# Patient Record
Sex: FEMALE | Race: WHITE | Employment: FULL TIME | URBAN - METROPOLITAN AREA
[De-identification: names, ages, dates, MRNs, and addresses within clinical notes are randomized per-mention and may not be internally consistent; named-entity substitution may affect disease eponyms.]

---

## 2017-03-07 ENCOUNTER — LAB ENCOUNTER (OUTPATIENT)
Dept: LAB | Facility: HOSPITAL | Age: 52
End: 2017-03-07
Attending: INTERNAL MEDICINE
Payer: COMMERCIAL

## 2017-03-07 DIAGNOSIS — E11.9 DIABETES MELLITUS (HCC): Primary | ICD-10-CM

## 2017-03-07 LAB
EST. AVERAGE GLUCOSE BLD GHB EST-MCNC: 148 MG/DL (ref 68–126)
HBA1C MFR BLD HPLC: 6.8 % (ref ?–5.7)

## 2017-03-07 PROCEDURE — 36415 COLL VENOUS BLD VENIPUNCTURE: CPT

## 2017-03-07 PROCEDURE — 83036 HEMOGLOBIN GLYCOSYLATED A1C: CPT

## 2017-06-16 ENCOUNTER — HOSPITAL ENCOUNTER (OUTPATIENT)
Dept: CT IMAGING | Facility: HOSPITAL | Age: 52
Discharge: HOME | End: 2017-06-16
Attending: INTERNAL MEDICINE

## 2017-06-16 DIAGNOSIS — Z13.9 ENCOUNTER FOR SCREENING: ICD-10-CM

## 2018-02-12 ENCOUNTER — LAB ENCOUNTER (OUTPATIENT)
Dept: LAB | Age: 53
End: 2018-02-12
Attending: INTERNAL MEDICINE
Payer: COMMERCIAL

## 2018-02-12 DIAGNOSIS — Z00.00 ROUTINE GENERAL MEDICAL EXAMINATION AT A HEALTH CARE FACILITY: Primary | ICD-10-CM

## 2018-02-12 LAB
25-HYDROXYVITAMIN D (TOTAL): 28.9 NG/ML (ref 30–100)
ALBUMIN SERPL-MCNC: 4.3 G/DL (ref 3.5–4.8)
ALP LIVER SERPL-CCNC: 61 U/L (ref 41–108)
ALT SERPL-CCNC: 53 U/L (ref 14–54)
AST SERPL-CCNC: 51 U/L (ref 15–41)
BASOPHILS # BLD AUTO: 0.07 X10(3) UL (ref 0–0.1)
BASOPHILS NFR BLD AUTO: 0.8 %
BILIRUB SERPL-MCNC: 0.5 MG/DL (ref 0.1–2)
BUN BLD-MCNC: 16 MG/DL (ref 8–20)
CALCIUM BLD-MCNC: 9.9 MG/DL (ref 8.3–10.3)
CHLORIDE: 104 MMOL/L (ref 101–111)
CHOLEST SMN-MCNC: 102 MG/DL (ref ?–200)
CO2: 26 MMOL/L (ref 22–32)
CREAT BLD-MCNC: 0.73 MG/DL (ref 0.55–1.02)
CREAT UR-SCNC: 55 MG/DL
EOSINOPHIL # BLD AUTO: 0.41 X10(3) UL (ref 0–0.3)
EOSINOPHIL NFR BLD AUTO: 4.6 %
ERYTHROCYTE [DISTWIDTH] IN BLOOD BY AUTOMATED COUNT: 12.5 % (ref 11.5–16)
EST. AVERAGE GLUCOSE BLD GHB EST-MCNC: 226 MG/DL (ref 68–126)
GLUCOSE BLD-MCNC: 202 MG/DL (ref 70–99)
HBA1C MFR BLD HPLC: 9.5 % (ref ?–5.7)
HCT VFR BLD AUTO: 45.4 % (ref 34–50)
HDLC SERPL-MCNC: 31 MG/DL (ref 45–?)
HDLC SERPL: 3.29 {RATIO} (ref ?–4.44)
HGB BLD-MCNC: 14.6 G/DL (ref 12–16)
IMMATURE GRANULOCYTE COUNT: 0.02 X10(3) UL (ref 0–1)
IMMATURE GRANULOCYTE RATIO %: 0.2 %
LDLC SERPL CALC-MCNC: 46 MG/DL (ref ?–130)
LYMPHOCYTES # BLD AUTO: 3.51 X10(3) UL (ref 0.9–4)
LYMPHOCYTES NFR BLD AUTO: 39.5 %
M PROTEIN MFR SERPL ELPH: 8.1 G/DL (ref 6.1–8.3)
MCH RBC QN AUTO: 29.1 PG (ref 27–33.2)
MCHC RBC AUTO-ENTMCNC: 32.2 G/DL (ref 31–37)
MCV RBC AUTO: 90.4 FL (ref 81–100)
MICROALBUMIN UR-MCNC: 0.63 MG/DL
MICROALBUMIN/CREAT 24H UR-RTO: 11.5 UG/MG (ref ?–30)
MONOCYTES # BLD AUTO: 0.67 X10(3) UL (ref 0.1–1)
MONOCYTES NFR BLD AUTO: 7.5 %
NEUTROPHIL ABS PRELIM: 4.21 X10 (3) UL (ref 1.3–6.7)
NEUTROPHILS # BLD AUTO: 4.21 X10(3) UL (ref 1.3–6.7)
NEUTROPHILS NFR BLD AUTO: 47.4 %
NONHDLC SERPL-MCNC: 71 MG/DL (ref ?–130)
PLATELET # BLD AUTO: 279 10(3)UL (ref 150–450)
POTASSIUM SERPL-SCNC: 4.8 MMOL/L (ref 3.6–5.1)
RBC # BLD AUTO: 5.02 X10(6)UL (ref 3.8–5.1)
RED CELL DISTRIBUTION WIDTH-SD: 41.1 FL (ref 35.1–46.3)
SODIUM SERPL-SCNC: 138 MMOL/L (ref 136–144)
TRIGL SERPL-MCNC: 123 MG/DL (ref ?–150)
TSI SER-ACNC: 0.69 MIU/ML (ref 0.35–5.5)
VLDLC SERPL CALC-MCNC: 25 MG/DL (ref 5–40)
WBC # BLD AUTO: 8.9 X10(3) UL (ref 4–13)

## 2018-02-12 PROCEDURE — 82306 VITAMIN D 25 HYDROXY: CPT

## 2018-02-12 PROCEDURE — 82570 ASSAY OF URINE CREATININE: CPT

## 2018-02-12 PROCEDURE — 84443 ASSAY THYROID STIM HORMONE: CPT

## 2018-02-12 PROCEDURE — 80053 COMPREHEN METABOLIC PANEL: CPT

## 2018-02-12 PROCEDURE — 82043 UR ALBUMIN QUANTITATIVE: CPT

## 2018-02-12 PROCEDURE — 80061 LIPID PANEL: CPT

## 2018-02-12 PROCEDURE — 36415 COLL VENOUS BLD VENIPUNCTURE: CPT

## 2018-02-12 PROCEDURE — 83036 HEMOGLOBIN GLYCOSYLATED A1C: CPT

## 2018-02-12 PROCEDURE — 85025 COMPLETE CBC W/AUTO DIFF WBC: CPT

## 2018-07-30 ENCOUNTER — LAB ENCOUNTER (OUTPATIENT)
Dept: LAB | Facility: HOSPITAL | Age: 53
End: 2018-07-30
Attending: INTERNAL MEDICINE
Payer: COMMERCIAL

## 2018-07-30 DIAGNOSIS — E11.9 DM2 (DIABETES MELLITUS, TYPE 2) (HCC): ICD-10-CM

## 2018-07-30 DIAGNOSIS — Z00.00 ROUTINE GENERAL MEDICAL EXAMINATION AT A HEALTH CARE FACILITY: Primary | ICD-10-CM

## 2018-07-30 LAB
ALBUMIN SERPL-MCNC: 3.7 G/DL (ref 3.5–4.8)
ALBUMIN/GLOB SERPL: 1 {RATIO} (ref 1–2)
ALP LIVER SERPL-CCNC: 78 U/L (ref 41–108)
ALT SERPL-CCNC: 37 U/L (ref 14–54)
ANION GAP SERPL CALC-SCNC: 7 MMOL/L (ref 0–18)
AST SERPL-CCNC: 27 U/L (ref 15–41)
BASOPHILS # BLD AUTO: 0.07 X10(3) UL (ref 0–0.1)
BASOPHILS NFR BLD AUTO: 0.8 %
BILIRUB SERPL-MCNC: 0.4 MG/DL (ref 0.1–2)
BUN BLD-MCNC: 17 MG/DL (ref 8–20)
BUN/CREAT SERPL: 28.3 (ref 10–20)
CALCIUM BLD-MCNC: 9.2 MG/DL (ref 8.3–10.3)
CHLORIDE SERPL-SCNC: 107 MMOL/L (ref 101–111)
CHOLEST SMN-MCNC: 111 MG/DL (ref ?–200)
CO2 SERPL-SCNC: 29 MMOL/L (ref 22–32)
CREAT BLD-MCNC: 0.6 MG/DL (ref 0.55–1.02)
CREAT UR-SCNC: 90.4 MG/DL
EOSINOPHIL # BLD AUTO: 0.47 X10(3) UL (ref 0–0.3)
EOSINOPHIL NFR BLD AUTO: 5.2 %
ERYTHROCYTE [DISTWIDTH] IN BLOOD BY AUTOMATED COUNT: 13.1 % (ref 11.5–16)
EST. AVERAGE GLUCOSE BLD GHB EST-MCNC: 154 MG/DL (ref 68–126)
GLOBULIN PLAS-MCNC: 3.8 G/DL (ref 2.5–3.7)
GLUCOSE BLD-MCNC: 158 MG/DL (ref 70–99)
HBA1C MFR BLD HPLC: 7 % (ref ?–5.7)
HCT VFR BLD AUTO: 43.8 % (ref 34–50)
HDLC SERPL-MCNC: 35 MG/DL (ref 40–59)
HGB BLD-MCNC: 14 G/DL (ref 12–16)
IMMATURE GRANULOCYTE COUNT: 0.05 X10(3) UL (ref 0–1)
IMMATURE GRANULOCYTE RATIO %: 0.6 %
LDLC SERPL CALC-MCNC: 45 MG/DL (ref ?–100)
LYMPHOCYTES # BLD AUTO: 2.92 X10(3) UL (ref 0.9–4)
LYMPHOCYTES NFR BLD AUTO: 32.4 %
M PROTEIN MFR SERPL ELPH: 7.5 G/DL (ref 6.1–8.3)
MCH RBC QN AUTO: 28.3 PG (ref 27–33.2)
MCHC RBC AUTO-ENTMCNC: 32 G/DL (ref 31–37)
MCV RBC AUTO: 88.7 FL (ref 81–100)
MICROALBUMIN UR-MCNC: 0.84 MG/DL
MICROALBUMIN/CREAT 24H UR-RTO: 9.3 UG/MG (ref ?–30)
MONOCYTES # BLD AUTO: 0.78 X10(3) UL (ref 0.1–1)
MONOCYTES NFR BLD AUTO: 8.7 %
NEUTROPHIL ABS PRELIM: 4.71 X10 (3) UL (ref 1.3–6.7)
NEUTROPHILS # BLD AUTO: 4.71 X10(3) UL (ref 1.3–6.7)
NEUTROPHILS NFR BLD AUTO: 52.3 %
NONHDLC SERPL-MCNC: 76 MG/DL (ref ?–130)
OSMOLALITY SERPL CALC.SUM OF ELEC: 301 MOSM/KG (ref 275–295)
PLATELET # BLD AUTO: 250 10(3)UL (ref 150–450)
POTASSIUM SERPL-SCNC: 4.7 MMOL/L (ref 3.6–5.1)
RBC # BLD AUTO: 4.94 X10(6)UL (ref 3.8–5.1)
RED CELL DISTRIBUTION WIDTH-SD: 42 FL (ref 35.1–46.3)
SODIUM SERPL-SCNC: 143 MMOL/L (ref 136–144)
T4 FREE SERPL-MCNC: 0.8 NG/DL (ref 0.9–1.8)
TRIGL SERPL-MCNC: 157 MG/DL (ref 30–149)
TSI SER-ACNC: 0.34 MIU/ML (ref 0.35–5.5)
VIT D+METAB SERPL-MCNC: 25.8 NG/ML (ref 30–100)
VLDLC SERPL CALC-MCNC: 31 MG/DL (ref 0–30)
WBC # BLD AUTO: 9 X10(3) UL (ref 4–13)

## 2018-07-30 PROCEDURE — 82306 VITAMIN D 25 HYDROXY: CPT

## 2018-07-30 PROCEDURE — 80061 LIPID PANEL: CPT

## 2018-07-30 PROCEDURE — 84439 ASSAY OF FREE THYROXINE: CPT

## 2018-07-30 PROCEDURE — 82043 UR ALBUMIN QUANTITATIVE: CPT

## 2018-07-30 PROCEDURE — 82570 ASSAY OF URINE CREATININE: CPT

## 2018-07-30 PROCEDURE — 80053 COMPREHEN METABOLIC PANEL: CPT

## 2018-07-30 PROCEDURE — 85025 COMPLETE CBC W/AUTO DIFF WBC: CPT

## 2018-07-30 PROCEDURE — 36415 COLL VENOUS BLD VENIPUNCTURE: CPT

## 2018-07-30 PROCEDURE — 84443 ASSAY THYROID STIM HORMONE: CPT

## 2018-07-30 PROCEDURE — 83036 HEMOGLOBIN GLYCOSYLATED A1C: CPT

## 2018-09-10 ENCOUNTER — HOSPITAL ENCOUNTER (OUTPATIENT)
Dept: MAMMOGRAPHY | Facility: HOSPITAL | Age: 53
Discharge: HOME | End: 2018-09-10
Attending: INTERNAL MEDICINE
Payer: COMMERCIAL

## 2018-09-10 DIAGNOSIS — Z12.31 SCREENING MAMMOGRAM, ENCOUNTER FOR: ICD-10-CM

## 2018-09-10 PROCEDURE — 77067 SCR MAMMO BI INCL CAD: CPT | Performed by: INTERNAL MEDICINE

## 2018-09-10 PROCEDURE — 77063 BREAST TOMOSYNTHESIS BI: CPT | Performed by: INTERNAL MEDICINE

## 2018-11-09 ENCOUNTER — HOSPITAL ENCOUNTER (OUTPATIENT)
Dept: CV DIAGNOSTICS | Facility: HOSPITAL | Age: 53
Discharge: HOME | End: 2018-11-09
Attending: INTERNAL MEDICINE
Payer: COMMERCIAL

## 2018-11-09 ENCOUNTER — APPOINTMENT (OUTPATIENT)
Dept: LAB | Facility: HOSPITAL | Age: 53
End: 2018-11-09
Attending: INTERNAL MEDICINE
Payer: COMMERCIAL

## 2018-11-09 DIAGNOSIS — E11.9 DM (DIABETES MELLITUS) (HCC): ICD-10-CM

## 2018-11-09 DIAGNOSIS — R93.1 ABNORMAL CT SCAN OF HEART: ICD-10-CM

## 2018-11-09 PROCEDURE — 93017 CV STRESS TEST TRACING ONLY: CPT | Performed by: INTERNAL MEDICINE

## 2018-11-09 PROCEDURE — 93018 CV STRESS TEST I&R ONLY: CPT | Performed by: INTERNAL MEDICINE

## 2018-11-14 ENCOUNTER — OFFICE VISIT (OUTPATIENT)
Dept: SLEEP CENTER | Facility: HOSPITAL | Age: 53
End: 2018-11-14
Attending: INTERNAL MEDICINE
Payer: COMMERCIAL

## 2018-11-14 DIAGNOSIS — R06.81 APNEA: ICD-10-CM

## 2018-11-14 DIAGNOSIS — G47.419 NARCOLEPSY: ICD-10-CM

## 2018-11-14 PROCEDURE — 95810 POLYSOM 6/> YRS 4/> PARAM: CPT

## 2018-11-21 ENCOUNTER — TELEPHONE (OUTPATIENT)
Dept: ENDOSCOPY | Facility: HOSPITAL | Age: 53
End: 2018-11-21

## 2018-11-21 NOTE — TELEPHONE ENCOUNTER
Returned pt's call   Spoke with patient regarding results of DX sleep study   AHI 8  Supine AHI 16 non-supine AHI 7 Sao2 Bry 85%   Patient stated understanding of results   Patient study is consistent with TAMEKA  Patient recommended to clinical correlation

## 2018-11-21 NOTE — PROCEDURES
[address]       Accredited by the [facility] ([facility])    PATIENT'S NAME:        Ofelia   ATTENDING PHYSICIAN:   Yessenia Melton M.D. REFERRING PHYSICIAN:   Yessenia Melton M.D.   PATIENT onset latency 243 minutes, wake after sleep onset 78 minutes, for a sleep efficiency of 83%. Sleep stage breakdown as follows:  Stage 1, 16%; stage 2, 70%; stage 3, 8%; stage REM 6%.      RESPIRATORY MEASURES:  The patient had a total of 52 obstructive, ap me with any additional questions.     Dictated By Bailee Cohen M.D.  d: 11/20/2018 17:24:32  t: 11/20/2018 20:23:48  Job [id number]  XD/    cc: Brandon Gonzalez M.D.

## 2019-05-02 ENCOUNTER — LAB ENCOUNTER (OUTPATIENT)
Dept: LAB | Facility: HOSPITAL | Age: 54
End: 2019-05-02
Attending: INTERNAL MEDICINE
Payer: COMMERCIAL

## 2019-05-02 DIAGNOSIS — E11.9 DIABETES MELLITUS WITHOUT COMPLICATION (HCC): ICD-10-CM

## 2019-05-02 DIAGNOSIS — Z00.00 ROUTINE GENERAL MEDICAL EXAMINATION AT A HEALTH CARE FACILITY: ICD-10-CM

## 2019-05-02 PROCEDURE — 84443 ASSAY THYROID STIM HORMONE: CPT

## 2019-05-02 PROCEDURE — 83036 HEMOGLOBIN GLYCOSYLATED A1C: CPT

## 2019-05-02 PROCEDURE — 82570 ASSAY OF URINE CREATININE: CPT

## 2019-05-02 PROCEDURE — 80061 LIPID PANEL: CPT

## 2019-05-02 PROCEDURE — 85025 COMPLETE CBC W/AUTO DIFF WBC: CPT

## 2019-05-02 PROCEDURE — 82043 UR ALBUMIN QUANTITATIVE: CPT

## 2019-05-02 PROCEDURE — 36415 COLL VENOUS BLD VENIPUNCTURE: CPT

## 2019-05-02 PROCEDURE — 82306 VITAMIN D 25 HYDROXY: CPT

## 2019-05-02 PROCEDURE — 80053 COMPREHEN METABOLIC PANEL: CPT

## 2019-09-23 RX ORDER — IBUPROFEN 800 MG/1
800 TABLET ORAL 2 TIMES DAILY
Status: ON HOLD | COMMUNITY
End: 2019-10-21

## 2019-09-23 RX ORDER — ACETAMINOPHEN 500 MG
1000 TABLET ORAL DAILY
Status: ON HOLD | COMMUNITY
End: 2019-10-21

## 2019-10-02 ENCOUNTER — APPOINTMENT (OUTPATIENT)
Dept: LAB | Facility: HOSPITAL | Age: 54
End: 2019-10-02
Attending: ORTHOPAEDIC SURGERY
Payer: COMMERCIAL

## 2019-10-02 DIAGNOSIS — M16.12 PRIMARY OSTEOARTHRITIS OF LEFT HIP: ICD-10-CM

## 2019-10-02 PROCEDURE — 80053 COMPREHEN METABOLIC PANEL: CPT

## 2019-10-02 PROCEDURE — 87081 CULTURE SCREEN ONLY: CPT

## 2019-10-02 PROCEDURE — 86900 BLOOD TYPING SEROLOGIC ABO: CPT

## 2019-10-02 PROCEDURE — 36415 COLL VENOUS BLD VENIPUNCTURE: CPT

## 2019-10-02 PROCEDURE — 93010 ELECTROCARDIOGRAM REPORT: CPT | Performed by: INTERNAL MEDICINE

## 2019-10-02 PROCEDURE — 86850 RBC ANTIBODY SCREEN: CPT

## 2019-10-02 PROCEDURE — 93005 ELECTROCARDIOGRAM TRACING: CPT

## 2019-10-02 PROCEDURE — 85610 PROTHROMBIN TIME: CPT

## 2019-10-02 PROCEDURE — 85025 COMPLETE CBC W/AUTO DIFF WBC: CPT

## 2019-10-02 PROCEDURE — 86901 BLOOD TYPING SEROLOGIC RH(D): CPT

## 2019-10-02 PROCEDURE — 85730 THROMBOPLASTIN TIME PARTIAL: CPT

## 2019-10-07 ENCOUNTER — LAB ENCOUNTER (OUTPATIENT)
Dept: LAB | Facility: HOSPITAL | Age: 54
End: 2019-10-07
Attending: INTERNAL MEDICINE
Payer: COMMERCIAL

## 2019-10-07 ENCOUNTER — HOSPITAL ENCOUNTER (OUTPATIENT)
Dept: PHYSICAL THERAPY | Facility: HOSPITAL | Age: 54
Discharge: HOME | End: 2019-10-07
Attending: ORTHOPAEDIC SURGERY
Payer: COMMERCIAL

## 2019-10-07 DIAGNOSIS — Z00.00 ENCOUNTER FOR BLOOD TEST FOR ROUTINE GENERAL PHYSICAL EXAMINATION: ICD-10-CM

## 2019-10-07 DIAGNOSIS — R69 DIAGNOSIS UNKNOWN: Primary | ICD-10-CM

## 2019-10-07 DIAGNOSIS — M16.12 PRIMARY OSTEOARTHRITIS OF LEFT HIP: ICD-10-CM

## 2019-10-07 PROCEDURE — 83036 HEMOGLOBIN GLYCOSYLATED A1C: CPT

## 2019-10-07 PROCEDURE — 82607 VITAMIN B-12: CPT

## 2019-10-07 PROCEDURE — 36415 COLL VENOUS BLD VENIPUNCTURE: CPT

## 2019-10-21 ENCOUNTER — ANESTHESIA EVENT (OUTPATIENT)
Dept: SURGERY | Facility: HOSPITAL | Age: 54
DRG: 470 | End: 2019-10-21
Payer: COMMERCIAL

## 2019-10-21 ENCOUNTER — APPOINTMENT (OUTPATIENT)
Dept: GENERAL RADIOLOGY | Facility: HOSPITAL | Age: 54
DRG: 470 | End: 2019-10-21
Attending: ORTHOPAEDIC SURGERY
Payer: COMMERCIAL

## 2019-10-21 ENCOUNTER — HOSPITAL ENCOUNTER (INPATIENT)
Facility: HOSPITAL | Age: 54
LOS: 2 days | Discharge: HOME HEALTH | DRG: 470 | End: 2019-10-23
Attending: ORTHOPAEDIC SURGERY | Admitting: ORTHOPAEDIC SURGERY
Payer: COMMERCIAL

## 2019-10-21 ENCOUNTER — ANESTHESIA (OUTPATIENT)
Dept: SURGERY | Facility: HOSPITAL | Age: 54
DRG: 470 | End: 2019-10-21
Payer: COMMERCIAL

## 2019-10-21 DIAGNOSIS — M16.12 PRIMARY OSTEOARTHRITIS OF LEFT HIP: Primary | ICD-10-CM

## 2019-10-21 PROBLEM — E66.9 OBESITY: Status: ACTIVE | Noted: 2019-10-21

## 2019-10-21 PROCEDURE — 82962 GLUCOSE BLOOD TEST: CPT

## 2019-10-21 PROCEDURE — 88304 TISSUE EXAM BY PATHOLOGIST: CPT | Performed by: ORTHOPAEDIC SURGERY

## 2019-10-21 PROCEDURE — 76000 FLUOROSCOPY <1 HR PHYS/QHP: CPT | Performed by: ORTHOPAEDIC SURGERY

## 2019-10-21 PROCEDURE — 76942 ECHO GUIDE FOR BIOPSY: CPT | Performed by: ORTHOPAEDIC SURGERY

## 2019-10-21 PROCEDURE — 3E0T3BZ INTRODUCTION OF ANESTHETIC AGENT INTO PERIPHERAL NERVES AND PLEXI, PERCUTANEOUS APPROACH: ICD-10-PCS | Performed by: ANESTHESIOLOGY

## 2019-10-21 PROCEDURE — 88311 DECALCIFY TISSUE: CPT | Performed by: ORTHOPAEDIC SURGERY

## 2019-10-21 PROCEDURE — 0SRB0JZ REPLACEMENT OF LEFT HIP JOINT WITH SYNTHETIC SUBSTITUTE, OPEN APPROACH: ICD-10-PCS | Performed by: ORTHOPAEDIC SURGERY

## 2019-10-21 PROCEDURE — 97161 PT EVAL LOW COMPLEX 20 MIN: CPT

## 2019-10-21 DEVICE — PINNACLE POROCOAT ACETABULAR SHELL SECTOR II 52MM OD
Type: IMPLANTABLE DEVICE | Site: HIP | Status: FUNCTIONAL
Brand: PINNACLE POROCOAT

## 2019-10-21 DEVICE — PINNACLE HIP SOLUTIONS ALTRX POLYETHYLENE ACETABULAR LINER NEUTRAL 32MM ID 52MM OD
Type: IMPLANTABLE DEVICE | Site: HIP | Status: FUNCTIONAL
Brand: PINNACLE ALTRX

## 2019-10-21 DEVICE — CORAIL HIP SYSTEM CEMENTLESS FEMORAL STEM 12/14 AMT 125 DEGREES KLA SIZE 13 HA COATED HIGH OFFSET COLLAR
Type: IMPLANTABLE DEVICE | Site: HIP | Status: FUNCTIONAL
Brand: CORAIL

## 2019-10-21 DEVICE — BIOLOX DELTA CERAMIC FEMORAL HEAD 32MM DIA +1 12/14 TAPER
Type: IMPLANTABLE DEVICE | Site: HIP | Status: FUNCTIONAL
Brand: BIOLOX DELTA

## 2019-10-21 RX ORDER — METFORMIN HYDROCHLORIDE 750 MG/1
1500 TABLET, EXTENDED RELEASE ORAL NIGHTLY
COMMUNITY

## 2019-10-21 RX ORDER — ZOLPIDEM TARTRATE 10 MG/1
10 TABLET ORAL NIGHTLY PRN
Status: DISCONTINUED | OUTPATIENT
Start: 2019-10-21 | End: 2019-10-21

## 2019-10-21 RX ORDER — DOCUSATE SODIUM 100 MG/1
100 CAPSULE, LIQUID FILLED ORAL 2 TIMES DAILY
Qty: 60 CAPSULE | Refills: 0 | Status: SHIPPED | OUTPATIENT
Start: 2019-10-21 | End: 2020-02-20

## 2019-10-21 RX ORDER — BISACODYL 10 MG
10 SUPPOSITORY, RECTAL RECTAL
Status: DISCONTINUED | OUTPATIENT
Start: 2019-10-21 | End: 2019-10-23

## 2019-10-21 RX ORDER — SODIUM CHLORIDE 9 MG/ML
INJECTION, SOLUTION INTRAVENOUS CONTINUOUS
Status: DISCONTINUED | OUTPATIENT
Start: 2019-10-21 | End: 2019-10-23

## 2019-10-21 RX ORDER — SENNOSIDES 8.6 MG
17.2 TABLET ORAL NIGHTLY
Status: DISCONTINUED | OUTPATIENT
Start: 2019-10-21 | End: 2019-10-23

## 2019-10-21 RX ORDER — OXYCODONE HYDROCHLORIDE 10 MG/1
10 TABLET ORAL EVERY 4 HOURS PRN
Status: DISCONTINUED | OUTPATIENT
Start: 2019-10-21 | End: 2019-10-22

## 2019-10-21 RX ORDER — PIOGLITAZONEHYDROCHLORIDE 15 MG/1
15 TABLET ORAL DAILY
Status: DISCONTINUED | OUTPATIENT
Start: 2019-10-21 | End: 2019-10-23

## 2019-10-21 RX ORDER — ALPRAZOLAM 1 MG/1
1 TABLET ORAL NIGHTLY PRN
Status: DISCONTINUED | OUTPATIENT
Start: 2019-10-21 | End: 2019-10-21

## 2019-10-21 RX ORDER — SODIUM PHOSPHATE, DIBASIC AND SODIUM PHOSPHATE, MONOBASIC 7; 19 G/133ML; G/133ML
1 ENEMA RECTAL ONCE AS NEEDED
Status: DISCONTINUED | OUTPATIENT
Start: 2019-10-21 | End: 2019-10-23

## 2019-10-21 RX ORDER — ACETAMINOPHEN 325 MG/1
650 TABLET ORAL 4 TIMES DAILY
Status: DISCONTINUED | OUTPATIENT
Start: 2019-10-21 | End: 2019-10-22

## 2019-10-21 RX ORDER — ESCITALOPRAM OXALATE 20 MG/1
20 TABLET ORAL NIGHTLY
Status: DISCONTINUED | OUTPATIENT
Start: 2019-10-21 | End: 2019-10-23

## 2019-10-21 RX ORDER — ONDANSETRON 2 MG/ML
4 INJECTION INTRAMUSCULAR; INTRAVENOUS AS NEEDED
Status: DISCONTINUED | OUTPATIENT
Start: 2019-10-21 | End: 2019-10-21 | Stop reason: HOSPADM

## 2019-10-21 RX ORDER — DIPHENHYDRAMINE HCL 25 MG
25 CAPSULE ORAL EVERY 4 HOURS PRN
Status: DISCONTINUED | OUTPATIENT
Start: 2019-10-21 | End: 2019-10-23

## 2019-10-21 RX ORDER — DULAGLUTIDE 1.5 MG/.5ML
0.5 INJECTION, SOLUTION SUBCUTANEOUS WEEKLY
Refills: 5 | COMMUNITY
Start: 2019-06-04 | End: 2021-07-20

## 2019-10-21 RX ORDER — HYDROMORPHONE HYDROCHLORIDE 1 MG/ML
0.2 INJECTION, SOLUTION INTRAMUSCULAR; INTRAVENOUS; SUBCUTANEOUS EVERY 2 HOUR PRN
Status: ACTIVE | OUTPATIENT
Start: 2019-10-21 | End: 2019-10-23

## 2019-10-21 RX ORDER — MIDAZOLAM HYDROCHLORIDE 1 MG/ML
1 INJECTION INTRAMUSCULAR; INTRAVENOUS EVERY 5 MIN PRN
Status: DISCONTINUED | OUTPATIENT
Start: 2019-10-21 | End: 2019-10-21 | Stop reason: HOSPADM

## 2019-10-21 RX ORDER — OXYCODONE HYDROCHLORIDE 5 MG/1
5 TABLET ORAL EVERY 4 HOURS PRN
Status: DISCONTINUED | OUTPATIENT
Start: 2019-10-21 | End: 2019-10-22

## 2019-10-21 RX ORDER — METOCLOPRAMIDE HYDROCHLORIDE 5 MG/ML
10 INJECTION INTRAMUSCULAR; INTRAVENOUS EVERY 6 HOURS PRN
Status: ACTIVE | OUTPATIENT
Start: 2019-10-21 | End: 2019-10-23

## 2019-10-21 RX ORDER — OXYCODONE HYDROCHLORIDE 15 MG/1
15 TABLET ORAL EVERY 4 HOURS PRN
Status: DISCONTINUED | OUTPATIENT
Start: 2019-10-21 | End: 2019-10-22

## 2019-10-21 RX ORDER — ONDANSETRON 2 MG/ML
4 INJECTION INTRAMUSCULAR; INTRAVENOUS EVERY 4 HOURS PRN
Status: DISCONTINUED | OUTPATIENT
Start: 2019-10-21 | End: 2019-10-23

## 2019-10-21 RX ORDER — LAMOTRIGINE 200 MG/1
200 TABLET ORAL 2 TIMES DAILY
Status: DISCONTINUED | OUTPATIENT
Start: 2019-10-21 | End: 2019-10-23

## 2019-10-21 RX ORDER — PROCHLORPERAZINE EDISYLATE 5 MG/ML
10 INJECTION INTRAMUSCULAR; INTRAVENOUS EVERY 6 HOURS PRN
Status: ACTIVE | OUTPATIENT
Start: 2019-10-21 | End: 2019-10-23

## 2019-10-21 RX ORDER — TIZANIDINE 2 MG/1
2 TABLET ORAL 3 TIMES DAILY PRN
Status: DISCONTINUED | OUTPATIENT
Start: 2019-10-21 | End: 2019-10-23

## 2019-10-21 RX ORDER — ACETAMINOPHEN 325 MG/1
TABLET ORAL
Status: COMPLETED
Start: 2019-10-21 | End: 2019-10-21

## 2019-10-21 RX ORDER — ALPRAZOLAM 0.25 MG/1
0.25 TABLET ORAL 2 TIMES DAILY PRN
Status: DISCONTINUED | OUTPATIENT
Start: 2019-10-21 | End: 2019-10-23

## 2019-10-21 RX ORDER — SODIUM CHLORIDE, SODIUM LACTATE, POTASSIUM CHLORIDE, CALCIUM CHLORIDE 600; 310; 30; 20 MG/100ML; MG/100ML; MG/100ML; MG/100ML
INJECTION, SOLUTION INTRAVENOUS CONTINUOUS
Status: DISCONTINUED | OUTPATIENT
Start: 2019-10-21 | End: 2019-10-23

## 2019-10-21 RX ORDER — TRAMADOL HYDROCHLORIDE 50 MG/1
50 TABLET ORAL EVERY 6 HOURS
Status: DISCONTINUED | OUTPATIENT
Start: 2019-10-21 | End: 2019-10-21

## 2019-10-21 RX ORDER — SODIUM CHLORIDE, SODIUM LACTATE, POTASSIUM CHLORIDE, CALCIUM CHLORIDE 600; 310; 30; 20 MG/100ML; MG/100ML; MG/100ML; MG/100ML
INJECTION, SOLUTION INTRAVENOUS CONTINUOUS
Status: DISCONTINUED | OUTPATIENT
Start: 2019-10-21 | End: 2019-10-21 | Stop reason: HOSPADM

## 2019-10-21 RX ORDER — ASPIRIN 325 MG
325 TABLET, DELAYED RELEASE (ENTERIC COATED) ORAL 2 TIMES DAILY
Status: DISCONTINUED | OUTPATIENT
Start: 2019-10-21 | End: 2019-10-23

## 2019-10-21 RX ORDER — TIZANIDINE 4 MG/1
4 TABLET ORAL 3 TIMES DAILY PRN
Status: DISCONTINUED | OUTPATIENT
Start: 2019-10-21 | End: 2019-10-21

## 2019-10-21 RX ORDER — DIPHENHYDRAMINE HYDROCHLORIDE 50 MG/ML
12.5 INJECTION INTRAMUSCULAR; INTRAVENOUS EVERY 4 HOURS PRN
Status: DISCONTINUED | OUTPATIENT
Start: 2019-10-21 | End: 2019-10-23

## 2019-10-21 RX ORDER — SCOLOPAMINE TRANSDERMAL SYSTEM 1 MG/1
1 PATCH, EXTENDED RELEASE TRANSDERMAL ONCE
Status: DISCONTINUED | OUTPATIENT
Start: 2019-10-21 | End: 2019-10-23

## 2019-10-21 RX ORDER — MEPERIDINE HYDROCHLORIDE 25 MG/ML
25 INJECTION INTRAMUSCULAR; INTRAVENOUS; SUBCUTANEOUS
Status: DISCONTINUED | OUTPATIENT
Start: 2019-10-21 | End: 2019-10-21 | Stop reason: HOSPADM

## 2019-10-21 RX ORDER — CEFAZOLIN SODIUM/WATER 2 G/20 ML
2 SYRINGE (ML) INTRAVENOUS EVERY 8 HOURS
Status: COMPLETED | OUTPATIENT
Start: 2019-10-21 | End: 2019-10-21

## 2019-10-21 RX ORDER — DOCUSATE SODIUM 100 MG/1
100 CAPSULE, LIQUID FILLED ORAL 2 TIMES DAILY
Status: DISCONTINUED | OUTPATIENT
Start: 2019-10-21 | End: 2019-10-23

## 2019-10-21 RX ORDER — ACETAMINOPHEN 500 MG
1000 TABLET ORAL ONCE
Status: DISCONTINUED | OUTPATIENT
Start: 2019-10-21 | End: 2019-10-21 | Stop reason: HOSPADM

## 2019-10-21 RX ORDER — CEFAZOLIN SODIUM/WATER 2 G/20 ML
SYRINGE (ML) INTRAVENOUS
Status: DISPENSED
Start: 2019-10-21 | End: 2019-10-21

## 2019-10-21 RX ORDER — ALPRAZOLAM 0.5 MG/1
0.5 TABLET ORAL NIGHTLY PRN
Status: DISCONTINUED | OUTPATIENT
Start: 2019-10-21 | End: 2019-10-23

## 2019-10-21 RX ORDER — METFORMIN HYDROCHLORIDE 750 MG/1
750 TABLET, EXTENDED RELEASE ORAL
COMMUNITY

## 2019-10-21 RX ORDER — ACETAMINOPHEN 325 MG/1
650 TABLET ORAL ONCE
Status: COMPLETED | OUTPATIENT
Start: 2019-10-21 | End: 2019-10-21

## 2019-10-21 RX ORDER — HYDROCODONE BITARTRATE AND ACETAMINOPHEN 10; 325 MG/1; MG/1
TABLET ORAL
Qty: 30 TABLET | Refills: 0 | Status: SHIPPED | OUTPATIENT
Start: 2019-10-21 | End: 2020-06-22 | Stop reason: ALTCHOICE

## 2019-10-21 RX ORDER — LISINOPRIL 10 MG/1
10 TABLET ORAL DAILY
Status: DISCONTINUED | OUTPATIENT
Start: 2019-10-22 | End: 2019-10-23

## 2019-10-21 RX ORDER — GLIMEPIRIDE 4 MG/1
4 TABLET ORAL EVERY EVENING
Refills: 1 | Status: ON HOLD | COMMUNITY
Start: 2019-06-13 | End: 2019-10-22

## 2019-10-21 RX ORDER — DEXTROSE MONOHYDRATE 25 G/50ML
50 INJECTION, SOLUTION INTRAVENOUS
Status: DISCONTINUED | OUTPATIENT
Start: 2019-10-21 | End: 2019-10-21 | Stop reason: HOSPADM

## 2019-10-21 RX ORDER — HYDROMORPHONE HYDROCHLORIDE 1 MG/ML
0.5 INJECTION, SOLUTION INTRAMUSCULAR; INTRAVENOUS; SUBCUTANEOUS EVERY 5 MIN PRN
Status: DISCONTINUED | OUTPATIENT
Start: 2019-10-21 | End: 2019-10-21 | Stop reason: HOSPADM

## 2019-10-21 RX ORDER — CELECOXIB 200 MG/1
200 CAPSULE ORAL 2 TIMES DAILY
Qty: 60 CAPSULE | Refills: 2 | Status: SHIPPED | OUTPATIENT
Start: 2019-10-21 | End: 2020-03-03

## 2019-10-21 RX ORDER — KETOROLAC TROMETHAMINE 30 MG/ML
30 INJECTION, SOLUTION INTRAMUSCULAR; INTRAVENOUS EVERY 6 HOURS
Status: COMPLETED | OUTPATIENT
Start: 2019-10-21 | End: 2019-10-22

## 2019-10-21 RX ORDER — GLIMEPIRIDE 2 MG/1
4 TABLET ORAL
Status: DISCONTINUED | OUTPATIENT
Start: 2019-10-22 | End: 2019-10-22

## 2019-10-21 RX ORDER — HYDROMORPHONE HYDROCHLORIDE 1 MG/ML
0.4 INJECTION, SOLUTION INTRAMUSCULAR; INTRAVENOUS; SUBCUTANEOUS EVERY 2 HOUR PRN
Status: DISPENSED | OUTPATIENT
Start: 2019-10-21 | End: 2019-10-23

## 2019-10-21 RX ORDER — DEXAMETHASONE SODIUM PHOSPHATE 4 MG/ML
4 VIAL (ML) INJECTION AS NEEDED
Status: DISCONTINUED | OUTPATIENT
Start: 2019-10-21 | End: 2019-10-21 | Stop reason: HOSPADM

## 2019-10-21 RX ORDER — POLYETHYLENE GLYCOL 3350 17 G/17G
17 POWDER, FOR SOLUTION ORAL DAILY PRN
Status: DISCONTINUED | OUTPATIENT
Start: 2019-10-21 | End: 2019-10-23

## 2019-10-21 RX ORDER — HYDROMORPHONE HYDROCHLORIDE 1 MG/ML
0.8 INJECTION, SOLUTION INTRAMUSCULAR; INTRAVENOUS; SUBCUTANEOUS EVERY 2 HOUR PRN
Status: DISCONTINUED | OUTPATIENT
Start: 2019-10-21 | End: 2019-10-22

## 2019-10-21 RX ORDER — SCOLOPAMINE TRANSDERMAL SYSTEM 1 MG/1
PATCH, EXTENDED RELEASE TRANSDERMAL
Status: DISCONTINUED
Start: 2019-10-21 | End: 2019-10-23

## 2019-10-21 RX ORDER — ATORVASTATIN CALCIUM 40 MG/1
40 TABLET, FILM COATED ORAL NIGHTLY
Status: DISCONTINUED | OUTPATIENT
Start: 2019-10-21 | End: 2019-10-23

## 2019-10-21 RX ORDER — CEFAZOLIN SODIUM/WATER 2 G/20 ML
2 SYRINGE (ML) INTRAVENOUS ONCE
Status: DISCONTINUED | OUTPATIENT
Start: 2019-10-21 | End: 2019-10-21 | Stop reason: HOSPADM

## 2019-10-21 RX ORDER — DIPHENHYDRAMINE HYDROCHLORIDE 50 MG/ML
25 INJECTION INTRAMUSCULAR; INTRAVENOUS ONCE AS NEEDED
Status: ACTIVE | OUTPATIENT
Start: 2019-10-21 | End: 2019-10-21

## 2019-10-21 RX ORDER — NALOXONE HYDROCHLORIDE 0.4 MG/ML
80 INJECTION, SOLUTION INTRAMUSCULAR; INTRAVENOUS; SUBCUTANEOUS AS NEEDED
Status: DISCONTINUED | OUTPATIENT
Start: 2019-10-21 | End: 2019-10-21 | Stop reason: HOSPADM

## 2019-10-21 NOTE — RESPIRATORY THERAPY NOTE
Patient on TAMEKA protocol but does not wear cpap at home. Will monitor patient with pulse ox at night.

## 2019-10-21 NOTE — ANESTHESIA PREPROCEDURE EVALUATION
PRE-OP EVALUATION    Patient Name: Jayne Cabrera    Pre-op Diagnosis: Primary osteoarthritis of left hip [M16.12]    Procedure(s):  LEFT ANTERIOR TOTAL HIP REPLACEMENT    Surgeon(s) and Role:     Eduardo Henry MD - Primary    Pre-op vitals reviewed.   Tem PRN  dexamethasone Sodium Phosphate (DECADRON) 4 MG/ML injection 4 mg, 4 mg, Intravenous, PRN  Midazolam HCl (VERSED) 2 MG/2ML injection 1 mg, 1 mg, Intravenous, Q5 Min PRN  Meperidine HCl (DEMEROL) 25 MG/ML injection 25 mg, 25 mg, Intravenous, Q1H PRN  [C Endo/Other      (+) diabetes  type 2,                          Pulmonary                    (+) sleep apnea       Neuro/Psych      (+) depression                              Past Surgical History:   Procedure Laterality Date   • OTHER SURGICAL HISTORY

## 2019-10-21 NOTE — CONSULTS
[facility]  Report of Consultation    Chidibartolo Winter Patient Status:  Inpatient    1965 MRN [id number]   [facility] 3SW-A Attending Adelfo Graves MD   Hosp Day # 0 PCP Sonny Burnham MD     Date of Admission:  10/21/2019  Date of Con by mouth nightly., Disp: , Rfl: , 10/19/2019  lamoTRIgine 200 MG Oral Tab, Take 1 tablet (200 mg total) by mouth 2 (two) times daily. , Disp: 180 tablet, Rfl: 2, 10/21/2019 at 0300  escitalopram 20 MG Oral Tab, Take 1 tablet (20 mg total) by mouth once shaila acetaminophen (TYLENOL) tab 650 mg, 650 mg, Oral, QID  •  oxyCODONE HCl (OXY-IR) cap/tab 5 mg, 5 mg, Oral, Q4H PRN **OR** OxyCODONE HCl IR (ROXICODONE) immediate release tab 10 mg, 10 mg, Oral, Q4H PRN **OR** OxyCODONE HCl IR (ROXICODONE) immediate release 10/22/2019] lisinopril tab 10 mg, 10 mg, Oral, Daily  •  atorvastatin (LIPITOR) tab 40 mg, 40 mg, Oral, Nightly  •  Pioglitazone HCl (ACTOS) tab 15 mg, 15 mg, Oral, Daily  •  lamoTRIgine (LAMICTAL) tab 200 mg, 200 mg, Oral, BID  •  escitalopram (LEXAPRO) t days allowed. ).     Impression:    Patient Active Problem List:     Osteoarthrosis, unspecified whether generalized or localized, pelvic region and thigh     Enthesopathy of hip region     Chest pain     Hypertension     DM type 2 (diabetes mellitus, type 2

## 2019-10-21 NOTE — OPERATIVE REPORT
[address] REPLACEMENT OPERATIVE REPORT    DATE OF SURGERY 10/21/2019    Tj Brooks       [id number]     5/25/1965    PRE-OP DX:  LEFT HIP PRIMARY OSTEOARTHRITIS  POST-OP DX:  LEFT HIP PRIMARY OSTEOARTHRITIS  PROCEDURE:  DIRECT ANTERIO DEGENERATIVE CHANGES WERE NOTED. FEMORAL NECK OSTEOTOMY WAS MADE. FEMORAL HEAD WAS REMOVED WITH A CORK SCREW. POSTERIOR AND INFERIOR ACETABULAR RETRACTORS WERE PLACED CAREFULLY. GOOD ACETABULAR EXPOSURE WAS OBTAINED. LABRAL TISSUE WAS EXCISED.   MED REMOVED. WOUND WAS IRRIGATED COPIOUSLY. HEMOSTASIS WAS OBTAINED. ACETABULUM WAS REEXPOSED. REAL LINER WAS IMPACTED. ITS SEATING WAS VERIFIED. PROXIMAL FEMUR WAS EXPOSED. REAL FEMORAL STEM WAS INSERTED WITH GOOD STABILITY. FEMORAL HEAD WAS IMPACTED.

## 2019-10-21 NOTE — H&P
[facility]  History & Physical    [facility] Patient Status:  Surgery Admit - Inpt    1965 MRN [id number]   Location [address] [place] [address] Attending Imtiaz Wallace MD   Hosp Day # 0 PCP Angeles Waterman MD   CC: left hip pain    H Oral Tab, Take 1 tablet (20 mg total) by mouth once daily. , Disp: 90 tablet, Rfl: 1, 10/20/2019 at 2100  ALPRAZolam 1 MG Oral Tab, Take 1 tablet (1 mg total) by mouth nightly as needed for Anxiety. , Disp: 30 tablet, Rfl: 3, 10/20/2019 at 2100  Pioglitazone discussed. Hospital course,  recovery process, expectations, limitations after surgery explained. Pros and cons of these options were explained. Prognosis discussed.   Risks and complications including but not limited to infection, DVT, PE/death,  nerve a

## 2019-10-21 NOTE — ANESTHESIA POSTPROCEDURE EVALUATION
Blanca 40 Patient Status:  Surgery Admit - Inpt   Age/Gender 53year old female MRN [id number]   Location [address] [facility] Attending Lauren Rouse MD   [address] [facility] [address] Day # 0 PCP Joanne Litten, MD       Anesthesia Post-op

## 2019-10-21 NOTE — PLAN OF CARE
Patient reported severe pain as soon as she got to the floor . Noted to be very anxious as well. Has been Medicated with IV and PO pain meds, contacted pain service and adjusted her medications. She was able to get up with therapy and tolerated it well.

## 2019-10-21 NOTE — PLAN OF CARE
Post op plan of care discussed with patient and sister at bedside. Goals discussed. Safety precautions discussed. Having increased pain post op, ivp pain med given as ordered, stated relief post admin. Will monitor. .

## 2019-10-21 NOTE — PHYSICAL THERAPY NOTE
PHYSICAL THERAPY HIP EVALUATION - INPATIENT     Room Number: 359/359-A  Evaluation Date: 10/21/2019  Type of Evaluation: Initial  Physician Order: PT Eval and Treat    Presenting Problem: S/p Direct Anterior Left TRAN on 10/21/19  Reason for Therapy: [facility] as Tolerated    PAIN ASSESSMENT  Ratin  Location: Left Hip @ surgical site  Management Techniques: Activity promotion; Body mechanics;Breathing techniques;Relaxation;Repositioning    COGNITION  · Overall Cognitive Status:  WFL - within functional limits FIM definations    Skilled Therapy Provided: Evaluation completed. Patient was instructed & educated in post surgical precautions & weight bearing status. Reviewed  handouts for precautions & reviewed multiple times during this session.  Patient was able to p bed mobility, transfers, and gait skills & safety. The patient is below baseline and would benefit from skilled inpatient PT to address the above deficits to assist patient in returning to prior to level of function.   DISCHARGE RECOMMENDATIONS  PT Dischar

## 2019-10-22 PROBLEM — Z96.642 STATUS POST TOTAL HIP REPLACEMENT, LEFT: Status: ACTIVE | Noted: 2019-10-21

## 2019-10-22 PROCEDURE — 82962 GLUCOSE BLOOD TEST: CPT

## 2019-10-22 PROCEDURE — 87086 URINE CULTURE/COLONY COUNT: CPT | Performed by: INTERNAL MEDICINE

## 2019-10-22 PROCEDURE — 97535 SELF CARE MNGMENT TRAINING: CPT

## 2019-10-22 PROCEDURE — 97150 GROUP THERAPEUTIC PROCEDURES: CPT

## 2019-10-22 PROCEDURE — 81001 URINALYSIS AUTO W/SCOPE: CPT | Performed by: INTERNAL MEDICINE

## 2019-10-22 PROCEDURE — 80048 BASIC METABOLIC PNL TOTAL CA: CPT | Performed by: PHYSICIAN ASSISTANT

## 2019-10-22 PROCEDURE — 97116 GAIT TRAINING THERAPY: CPT

## 2019-10-22 PROCEDURE — 97165 OT EVAL LOW COMPLEX 30 MIN: CPT

## 2019-10-22 PROCEDURE — 85027 COMPLETE CBC AUTOMATED: CPT | Performed by: PHYSICIAN ASSISTANT

## 2019-10-22 RX ORDER — HYDROCODONE BITARTRATE AND ACETAMINOPHEN 10; 325 MG/1; MG/1
2 TABLET ORAL EVERY 6 HOURS PRN
Status: DISCONTINUED | OUTPATIENT
Start: 2019-10-22 | End: 2019-10-23

## 2019-10-22 RX ORDER — ACETAMINOPHEN 325 MG/1
650 TABLET ORAL EVERY 4 HOURS PRN
Status: DISCONTINUED | OUTPATIENT
Start: 2019-10-22 | End: 2019-10-23

## 2019-10-22 RX ORDER — GLIMEPIRIDE 4 MG/1
4 TABLET ORAL
Refills: 0 | Status: SHIPPED | COMMUNITY
Start: 2019-10-23 | End: 2021-03-23

## 2019-10-22 RX ORDER — HYDROCODONE BITARTRATE AND ACETAMINOPHEN 10; 325 MG/1; MG/1
1 TABLET ORAL EVERY 4 HOURS PRN
Status: DISCONTINUED | OUTPATIENT
Start: 2019-10-22 | End: 2019-10-23

## 2019-10-22 RX ORDER — GLIMEPIRIDE 2 MG/1
4 TABLET ORAL
Status: DISCONTINUED | OUTPATIENT
Start: 2019-10-22 | End: 2019-10-23

## 2019-10-22 RX ORDER — ACETAMINOPHEN 325 MG/1
325 TABLET ORAL EVERY 4 HOURS PRN
Status: DISCONTINUED | OUTPATIENT
Start: 2019-10-22 | End: 2019-10-23

## 2019-10-22 NOTE — HOME CARE LIAISON
MET WITH PTNT AND OFFERED CHOICE  OF AGENCIES. PTNT AGREEABLE TO [facility]. MET WITH PTNT TO DISCUSS HOME HEALTH SERVICES AND COVERAGE CRITERIA. PTNT AGREEABLE TO [facility]. PTNT GIVEN RESIDENTIAL BROCHURE.  RESIDENTIAL WITH PROVIDE SN/PT ON DISC

## 2019-10-22 NOTE — PHYSICAL THERAPY NOTE
PHYSICAL THERAPY HIP TREATMENT NOTE - INPATIENT      Room Number: 844/003-T     Session: 1&2  Number of Visits to Meet Established Goals: 3    Presenting Problem: S/p Direct Anterior Left TRAN on 10/21/19    Problem List  Principal Problem:    Primary osteo in bed (including adjusting bedclothes, sheets and blankets)?: None   -   Sitting down on and standing up from a chair with arms (e.g., wheelchair, bedside commode, etc.): A Little   -   Moving from lying on back to sitting on the side of the bed?: A Littl questions and concerns addressed; Family present    ASSESSMENT   Pt continues to present with impaired strength LLE , decreased endurance and impaired balance below PLOF s/p L TRAN 10/21/19 and will continue to benefit from ongoing IP PT to maximize function

## 2019-10-22 NOTE — CM/SW NOTE
52 yo sp total hip replacement. Surgeon ofc visit note indicates plan for home discharge. PT post op is recommending home health.      HOME SITUATION  Type of Home: House  Home Layout: One level  Lives With: Significant other     Toilet and Equipment: S

## 2019-10-22 NOTE — PROGRESS NOTES
Patient was not able to attend PT class this afternoon d/t hypotension. Per Dr Young Hogan will stay overnight and re asess her in am. Dr Young Hogan will see her around noon tomorrow.

## 2019-10-22 NOTE — OCCUPATIONAL THERAPY NOTE
OCCUPATIONAL THERAPY QUICK EVALUATION - INPATIENT    Room Number: 359/359-A  Evaluation Date: 10/22/2019     Type of Evaluation: Quick Eval  Presenting Problem: s/p L TRAN 10/21/19    Physician Order: IP Consult to Occupational Therapy  Reason for Therapy: backwards, right? \"    OBJECTIVE  Precautions: TRAN - anterior  Fall Risk: High fall risk    WEIGHT BEARING RESTRICTION  Weight Bearing Restriction: L lower extremity           L Lower Extremity: Weight Bearing as Tolerated    PAIN ASSESSMENT  Ratin  Lo for pants (socks deferred, reports typically does not wear but boyfriend can assist if needed, aware of sock aid), to waist SBA for balance in standing; UB dressing Mod I; education on toileting and toilet transfer techniques, performed transfer with stand evaluated and presents with no skilled Occupational Therapy needs at this time. Patient discharged from Occupational Therapy services. Please re-order if a new functional limitation presents during this admission.     Patient was able to achieve the follo

## 2019-10-22 NOTE — PROGRESS NOTES
Orthopedic surgery progress note    Sherie Stacey Patient Status:  Inpatient    1965 MRN [id number]   [facility] 3SW-A Attending Quincy Lindo MD   [facility] Day # 1 PCP Ginna Theodore MD       Subjective:  No major complaints.   No calf pain,

## 2019-10-22 NOTE — PROGRESS NOTES
DR. Arnulfo Serrano SAW PT. AND INSTRUCTED PT. TO LIFT HER ABDOMINAL FOLDS BEFORE APPLYING COVERLET DRESSING TO LEFT HIP INCISION POST DISHARGE. RN TO RE INSTRUCT PT. ORDER GIVEN TO DISCONTINUE OXY IR AND START ON NORCO 10 PRN. PER DR. Arnulfo Serrano PT.  [facility]

## 2019-10-22 NOTE — PLAN OF CARE
PT. C/O OF MODERATE TO MILD POSTOP PAIN MANAGED WITH SCHEDULED TYLENOL AND TORADOL AND PRN OXY IR 10. VSS. SURGICAL DRESSING CDI. PT. TOLERATED AMBULATION TO THE BATHROOM WITH WALKER AND 1 ASSIST. PT. VOIDING WITHOUT DIFFICULTY. SAFETY MEASURES IN PLACE.

## 2019-10-22 NOTE — PROGRESS NOTES
Post Op Day 1 Ortho Note: Left Anterior THR    Status Post Nerve Block:  Type of Nerve Block: Left Fascia Iliaca  Single Injection Nerve Block    Post op review: No evidence of immediate block related complications, No paresthesia noted, Able to lift leg(s

## 2019-10-22 NOTE — PLAN OF CARE
HR tachy at rest. BP dropped this afternoon, pt received 1000 ml bolus NS. BP was still hypotensive for pt's baseline. Pt denied dizziness, numbness, tingling. Voided via bed pan. Neuro assessment- good pulses, warm extremities, good strength.  Taking po pa

## 2019-10-22 NOTE — PROGRESS NOTES
JUAN Progress Note      Julio C Bob Patient Status:  Inpatient    1965 MRN [id number]   [facility] 3SW-A Attending Andree Peter MD     Subjective:  Day 2 of surgery for left hip replacement.   Hemoglobin dropped to 9.6 from 13.4 prior cholesterol less than 100 mg/dL    Depression    Obesity    Status post total hip replacement, left  Hypotension. No reported significant bleeding at operative site. About 4 g drop in hemoglobin. Ortho following. Plan:  1.  Change Amaryl to 4 mg in the

## 2019-10-23 VITALS
BODY MASS INDEX: 31.85 KG/M2 | TEMPERATURE: 99 F | SYSTOLIC BLOOD PRESSURE: 114 MMHG | HEART RATE: 102 BPM | OXYGEN SATURATION: 94 % | DIASTOLIC BLOOD PRESSURE: 66 MMHG | HEIGHT: 65 IN | RESPIRATION RATE: 18 BRPM | WEIGHT: 191.13 LBS

## 2019-10-23 PROCEDURE — 97150 GROUP THERAPEUTIC PROCEDURES: CPT

## 2019-10-23 PROCEDURE — 97116 GAIT TRAINING THERAPY: CPT

## 2019-10-23 PROCEDURE — 85027 COMPLETE CBC AUTOMATED: CPT | Performed by: PHYSICIAN ASSISTANT

## 2019-10-23 PROCEDURE — 82962 GLUCOSE BLOOD TEST: CPT

## 2019-10-23 NOTE — PROGRESS NOTES
Acute Pain Service    Post Op Day 2 Ortho Note    Assessed patient in bed. Patient states pain in left hip persists but reports pain is better today and [employer] is working well to manage pain; denies itching/nausea/dizziness.     Patient able to bear weight o

## 2019-10-23 NOTE — CM/SW NOTE
10/23/19 1335   Discharge disposition   Expected discharge disposition Home-Health   Name of Facillity/Home Care/Hospice Residential   Discharge transportation Private car

## 2019-10-23 NOTE — PLAN OF CARE
Patient states pain much better today. BP better. Left hip aquacel intact. Abductor pillow in place. Up with min assist and the walker. Voiding without difficulty. Plan of care reviewed. Safety precautions maintained. Call light within reach. Bed alarm on.

## 2019-10-23 NOTE — PROGRESS NOTES
Jose Barrera Progress Note      Andrez Calixto Patient Status:  Inpatient    1965 MRN [id number]   [facility] 3SW-A Attending Afshan Padilla MD   Hosp Day # 2 PCP Baudilio Banuelos MD     Subjective:  Patient feels much better.   Pressure is also be osteoarthritis of left hip  Active Problems:    Hypertension    DM type 2 (diabetes mellitus, type 2) (HCC)    Obstructive sleep apnea    Hyperlipidemia with target low density lipoprotein (LDL) cholesterol less than 100 mg/dL    Depression    Obesity    S

## 2019-10-23 NOTE — PLAN OF CARE
Pt tolerated up with therapy and she is ready for d/c home. Instructions given notified dr. Zana Montoya for d/c. Removed iv and  prior to d/c home With home care. Pt d/c per w/c walker, abductor pillow and rx's filled by pharmacy prior to d/c home.

## 2019-10-23 NOTE — PHYSICAL THERAPY NOTE
PHYSICAL THERAPY HIP TREATMENT NOTE - INPATIENT      Room Number: 675/935-O     Session: 2  Number of Visits to Meet Established Goals: 3    Presenting Problem: S/p Direct Anterior Left TRAN on 10/21/19    Problem List  Principal Problem:    Primary osteoar Turning over in bed (including adjusting bedclothes, sheets and blankets)?: None   -   Sitting down on and standing up from a chair with arms (e.g., wheelchair, bedside commode, etc.): None   -   Moving from lying on back to sitting on the side of the bed? questions and concerns addressed; Family present    ASSESSMENT   Pt continues to present with impaired strength LLE , decreased endurance and impaired balance below PLOF s/p L TRAN 10/21/19 and will continue to benefit from ongoing IP PT to maximize function

## 2019-10-23 NOTE — PROGRESS NOTES
Orthopedic surgery progress note    Dorothy Francis Patient Status:  Inpatient    1965 MRN [id number]   [facility] 3SW-A Attending Richardson Chappell MD   [facility] Day # 2 PCP Sandra Camarillo MD       Subjective:  Pain is better. Jaden Briggs to go home.

## 2019-10-24 NOTE — PAYOR COMM NOTE
--------------  DISCHARGE REVIEW    Payor[facility] Kellen [facility]LLIVER [facility] #:  [id number]  Authorization Number: [id number]    Admit date: 10/21/19  Admit time:  5978  Discharge Date: 10/23/2019  1:22 PM     Admitting Physician: Gurjit Jonas,

## 2019-10-24 NOTE — PAYOR COMM NOTE
--------------  ADMISSION REVIEW     Payor: Ivy Avila  Subscriber #:  [id number]  Authorization Number: [id number]    Admit date: 10/21/19  Admit time: 26       Admitting Physician: Favian Jarquin MD  Attending Physician:  Ernestina att.  p She has never used smokeless tobacco. She reports current alcohol use. She reports that she does not use drugs. Allergies:  No Known Allergies    Home Medications:  acetaminophen 500 MG Oral Tab, Take 1,000 mg by mouth daily. , Disp: , Rfl: , 10/21/2019 10/21/2012   SpO2 96%   BMI 31.81 kg/m²    HEENT: Exam is unremarkable. Lungs: Clear to auscultation bilaterally. Cardiac: Regular rate and rhythm. No murmur. Abdomen:  Soft, non-distended, non-tender. Extremities:  left hip skin is intact.   Both calv OR AND WAS LAID IN SUPINE POSITION. PREOPERATIVE ANTIBIOTIC WAS GIVEN. NONOPERATIVE LEG HAD SCD APPLIED. ANESTHESIA WAS ADMINISTERED. ARMS WERE POSITIONED BY ANESTHESIA. PATIENT WAS POSITIONED ON THE TABLE WITH POST.     NONOPERATIVE LEG WAS PADDED AN DEGREE OF ANTEVERSION. IT WAS FELT TO BE A STABLE FIT. C ARM WAS USED TO VERIFY THE POSITION OF THE CUP. A TRIAL LINER WAS INSERTED. PROXIMAL FEMUR WAS EXPOSED. FOOT WAS EXTERNALLY ROTATED. SUPERIOR CAPSULAR RELEASE WAS PERFORMED.   HIP WAS THEN EXTEN POSITIONING, RETRACTION OF SOFT TISSUES FOR ACETABULAR AND FEMORAL EXPOSURE, IMPLANT INSERTION, DISLOCATION AND REDUCTION OF THE HIP JOINT, STABILITY TESTING.     Bruce Mcnulty MD  10/21/2019  8:37 AM                     Electronically signed by Alexandr Lindquist

## 2019-11-03 NOTE — DISCHARGE SUMMARY
Discharge Summary  Patient ID:  Julio C Bob  [id number]  53year old  5/25/1965    Admit date: 10/21/2019    Discharge date and time: 10/23/19    Attending Physician: No att. providers found     Reason for admission: left hip primary OA    Discharge Diag Print Script, Disp-60 tablet, R-0      CONTINUE these medications which have CHANGED    glimepiride 4 MG Oral Tab  Take 1 tablet (4 mg total) by mouth daily with breakfast., Historical, R-0      CONTINUE these medications which have NOT CHANGED    !!  metFO

## 2020-02-20 ENCOUNTER — APPOINTMENT (OUTPATIENT)
Dept: LAB | Facility: HOSPITAL | Age: 55
End: 2020-02-20
Attending: ORTHOPAEDIC SURGERY
Payer: COMMERCIAL

## 2020-02-20 ENCOUNTER — HOSPITAL ENCOUNTER (OUTPATIENT)
Dept: MRI IMAGING | Facility: HOSPITAL | Age: 55
Discharge: HOME | End: 2020-02-20
Attending: ORTHOPAEDIC SURGERY
Payer: COMMERCIAL

## 2020-02-20 DIAGNOSIS — Z96.642 STATUS POST TOTAL HIP REPLACEMENT, LEFT: ICD-10-CM

## 2020-02-20 LAB
CRP SERPL-MCNC: <0.29 MG/DL (ref ?–0.3)
SED RATE-ML: 8 MM/HR (ref 0–25)

## 2020-02-20 PROCEDURE — 85652 RBC SED RATE AUTOMATED: CPT

## 2020-02-20 PROCEDURE — 36415 COLL VENOUS BLD VENIPUNCTURE: CPT

## 2020-02-20 PROCEDURE — 86140 C-REACTIVE PROTEIN: CPT

## 2020-02-20 PROCEDURE — 72148 MRI LUMBAR SPINE W/O DYE: CPT | Performed by: ORTHOPAEDIC SURGERY

## 2020-02-28 ENCOUNTER — PATIENT MESSAGE (OUTPATIENT)
Dept: PAIN CLINIC | Facility: CLINIC | Age: 55
End: 2020-02-28

## 2020-03-02 NOTE — TELEPHONE ENCOUNTER
From: Mitchell Yan  To: Mohan Rodríguez MD  Sent: 2/28/2020 4:11 PM CST  Subject: Non-Urgent Medical Question    I have a cortisone injection scheduled for next Friday. Can i work the next day?

## 2020-03-10 NOTE — TELEPHONE ENCOUNTER
Pt has referral from Dr. Blanca Lea to see Pain mgmnt.  Endorsed to Dr Robert Valdivia for review  (Pt had X'd appt with Dr Robert Valdivia stating Dr Valente Soriano ref her, but pt states Dr Blanca Lea is the referring)

## 2020-03-13 ENCOUNTER — OFFICE VISIT (OUTPATIENT)
Dept: PAIN CLINIC | Facility: CLINIC | Age: 55
End: 2020-03-13
Payer: COMMERCIAL

## 2020-03-13 VITALS
DIASTOLIC BLOOD PRESSURE: 70 MMHG | HEIGHT: 65 IN | OXYGEN SATURATION: 95 % | BODY MASS INDEX: 32.32 KG/M2 | WEIGHT: 194 LBS | SYSTOLIC BLOOD PRESSURE: 120 MMHG | HEART RATE: 78 BPM

## 2020-03-13 DIAGNOSIS — M70.62 TROCHANTERIC BURSITIS, LEFT HIP: ICD-10-CM

## 2020-03-13 DIAGNOSIS — Z96.642 STATUS POST TOTAL HIP REPLACEMENT, LEFT: Primary | ICD-10-CM

## 2020-03-13 DIAGNOSIS — M51.26 LUMBAR DISC HERNIATION: ICD-10-CM

## 2020-03-13 DIAGNOSIS — M51.36 DDD (DEGENERATIVE DISC DISEASE), LUMBAR: ICD-10-CM

## 2020-03-13 PROBLEM — M51.369 DDD (DEGENERATIVE DISC DISEASE), LUMBAR: Status: ACTIVE | Noted: 2020-03-13

## 2020-03-13 PROCEDURE — 99203 OFFICE O/P NEW LOW 30 MIN: CPT | Performed by: ANESTHESIOLOGY

## 2020-03-13 RX ORDER — PIOGLITAZONEHYDROCHLORIDE 30 MG/1
TABLET ORAL
COMMUNITY
Start: 2019-12-17

## 2020-03-13 RX ORDER — BLOOD SUGAR DIAGNOSTIC
STRIP MISCELLANEOUS
COMMUNITY
Start: 2019-12-30

## 2020-03-13 RX ORDER — FLUTICASONE PROPIONATE 50 MCG
SPRAY, SUSPENSION (ML) NASAL
COMMUNITY
Start: 2020-02-26

## 2020-03-13 NOTE — H&P
Name: Dorothy Francis   : 1965   DOS: 3/13/2020     Chief complaint: Left-sided thigh pain    History of present illness:  Dorothy Francis is a 53year old female with a history of left-sided total hip arthroplasty, referred by Dr. Jefe Lopez, for evaluatio 20 MG Oral Tab Take 1 tablet (20 mg total) by mouth once daily. 90 tablet 1   • ALPRAZolam 1 MG Oral Tab TAKE 1 TABLET BY MOUTH NIGHTLY AS NEEDED FOR ANXIETY 30 tablet 2   • predniSONE 20 MG Oral Tab Take 1 tablet (20 mg total) by mouth daily.  5 tablet 0 frequency: Never    Drug use: No      Review of  other systems:  10 point ROS otherwise negative  Physical examination: Vidal Gillespie is a 53year old female not in acute distress  /70 (BP Location: Left arm, Patient Position: Sitting, Cuff Size: adult)   Pu first treatment option prior to transforaminal epidural steroid injections. The patient is in agreement.       Howard Chin MD  Pain Management

## 2020-03-13 NOTE — PROGRESS NOTES
PHYSICAL EXAM:   Physical Exam   Constitutional:           Patient presents in office today with reported pain in left thigh down to the shin     Current pain level reported = 4/10    Last reported dose of HYDROcodone-acetaminophen (NORCO)  MG Oral

## 2020-03-19 ENCOUNTER — OFFICE VISIT (OUTPATIENT)
Dept: PAIN CLINIC | Facility: CLINIC | Age: 55
End: 2020-03-19
Payer: COMMERCIAL

## 2020-03-19 VITALS
HEIGHT: 65 IN | BODY MASS INDEX: 32.49 KG/M2 | WEIGHT: 195 LBS | DIASTOLIC BLOOD PRESSURE: 70 MMHG | OXYGEN SATURATION: 96 % | HEART RATE: 85 BPM | SYSTOLIC BLOOD PRESSURE: 130 MMHG

## 2020-03-19 DIAGNOSIS — M70.62 TROCHANTERIC BURSITIS, LEFT HIP: Primary | ICD-10-CM

## 2020-03-19 PROCEDURE — 20610 DRAIN/INJ JOINT/BURSA W/O US: CPT | Performed by: ANESTHESIOLOGY

## 2020-03-19 PROCEDURE — 77002 NEEDLE LOCALIZATION BY XRAY: CPT | Performed by: ANESTHESIOLOGY

## 2020-03-19 RX ORDER — BUPIVACAINE HYDROCHLORIDE 5 MG/ML
9 INJECTION, SOLUTION EPIDURAL; INTRACAUDAL ONCE
Status: COMPLETED | OUTPATIENT
Start: 2020-03-19 | End: 2020-03-19

## 2020-03-19 RX ORDER — METHYLPREDNISOLONE ACETATE 40 MG/ML
40 INJECTION, SUSPENSION INTRA-ARTICULAR; INTRALESIONAL; INTRAMUSCULAR; SOFT TISSUE ONCE
Status: COMPLETED | OUTPATIENT
Start: 2020-03-19 | End: 2020-03-19

## 2020-03-19 RX ORDER — LIDOCAINE HYDROCHLORIDE 10 MG/ML
5 INJECTION, SOLUTION INFILTRATION; PERINEURAL ONCE
Status: COMPLETED | OUTPATIENT
Start: 2020-03-19 | End: 2020-03-19

## 2020-03-19 NOTE — PROCEDURES
Date of procedure: March 19, 2020    Preop diagnosis: Left trochanteric bursitis    Postop diagnosis: Same    Procedure: Left trochanteric bursa injection with ultrasound guidance    Surgeon: Barbara Woodruff    Anesthesia: Local    EBL: None    Indication:  The

## 2020-03-19 NOTE — PROGRESS NOTES
Patient presents in office today with reported pain in left thigh    Current pain level reported = 4/10    Last reported dose of HYDROcodone-acetaminophen (NORCO)  MG Oral Tab one month

## 2020-03-19 NOTE — PROGRESS NOTES
Timeout completed prior to procedure @ 2745. Participants present for timeout:  Dr. Mai Dang, [address], Saw Grijalva, and patient.

## 2020-06-05 ENCOUNTER — LAB ENCOUNTER (OUTPATIENT)
Dept: LAB | Facility: HOSPITAL | Age: 55
End: 2020-06-05
Attending: INTERNAL MEDICINE
Payer: COMMERCIAL

## 2020-06-05 DIAGNOSIS — Z00.00 ROUTINE GENERAL MEDICAL EXAMINATION AT A HEALTH CARE FACILITY: Primary | ICD-10-CM

## 2020-06-05 PROCEDURE — 82306 VITAMIN D 25 HYDROXY: CPT

## 2020-06-05 PROCEDURE — 80053 COMPREHEN METABOLIC PANEL: CPT

## 2020-06-05 PROCEDURE — 82570 ASSAY OF URINE CREATININE: CPT

## 2020-06-05 PROCEDURE — 82043 UR ALBUMIN QUANTITATIVE: CPT

## 2020-06-05 PROCEDURE — 84443 ASSAY THYROID STIM HORMONE: CPT

## 2020-06-05 PROCEDURE — 80061 LIPID PANEL: CPT

## 2020-06-05 PROCEDURE — 36415 COLL VENOUS BLD VENIPUNCTURE: CPT

## 2020-06-05 PROCEDURE — 85025 COMPLETE CBC W/AUTO DIFF WBC: CPT

## 2020-06-05 PROCEDURE — 83036 HEMOGLOBIN GLYCOSYLATED A1C: CPT

## 2020-06-22 ENCOUNTER — TELEPHONE (OUTPATIENT)
Dept: PAIN CLINIC | Facility: CLINIC | Age: 55
End: 2020-06-22

## 2020-06-22 ENCOUNTER — OFFICE VISIT (OUTPATIENT)
Dept: PAIN CLINIC | Facility: CLINIC | Age: 55
End: 2020-06-22
Payer: COMMERCIAL

## 2020-06-22 VITALS — WEIGHT: 200 LBS | HEIGHT: 65 IN | BODY MASS INDEX: 33.32 KG/M2

## 2020-06-22 DIAGNOSIS — M70.62 TROCHANTERIC BURSITIS, LEFT HIP: ICD-10-CM

## 2020-06-22 DIAGNOSIS — M51.26 LUMBAR DISC HERNIATION: Primary | ICD-10-CM

## 2020-06-22 PROCEDURE — 99213 OFFICE O/P EST LOW 20 MIN: CPT | Performed by: ANESTHESIOLOGY

## 2020-06-22 RX ORDER — GABAPENTIN 100 MG/1
100 CAPSULE ORAL 3 TIMES DAILY
Qty: 90 CAPSULE | Refills: 0 | Status: SHIPPED | OUTPATIENT
Start: 2020-06-22 | End: 2020-09-10

## 2020-06-22 NOTE — TELEPHONE ENCOUNTER
Medical clearance needed- no    Implanted cardiac device/SCS/PNS: n/a    Pt seen in OV today by Dr. Anjali Lundy and recommended for TLESI (X 1). Please begin PA process for procedure(s).    *per provider, pt does not need to hold ASA 325mg*  Laterality: Left  Tato Pereyra

## 2020-06-22 NOTE — TELEPHONE ENCOUNTER
Patient recommended to complete Left Trochanteric Bursa Injection with Dr. Nhi Villegas in office. Please call patient to schedule.

## 2020-06-22 NOTE — PROGRESS NOTES
Name: Farooq Bolanos   : 1965   DOS: 2020     Pain Clinic Follow Up Visit:   Patient presents with: Follow - Up      Farooq Bolanos is a 63year old female with a history of left-sided hip and thigh pain, here for follow-up.   The patient was Subcutaneous Solution Pen-injector Inject 0.5 mL into the skin once a week. 5   • metFORMIN HCl  MG Oral Tablet 24 Hr Take 750 mg by mouth daily with breakfast.     • metFORMIN HCl  MG Oral Tablet 24 Hr Take 1,500 mg by mouth nightly.      • as move forward. Pain is  limiting functional status. Failed conservative treatment consisting of medications, activity modification, and  PT.   1.Risks of long term narcotic use d/w pt including dependence, abuse, and death from overdose and mixing narcoti

## 2020-06-22 NOTE — PROGRESS NOTES
Patient presents in office today with reported pain in left thigh, lower back     Current pain level reported = 6/10    Last reported dose of HYDROcodone-acetaminophen (NORCO)  MG Oral Tab

## 2020-06-23 NOTE — TELEPHONE ENCOUNTER
Prior authorization request completed for: Left TLESI   Authorization #No Prior Authorization/Nor Pre Determination is Required   -No Exclusions   Spoke with Josh Strange at [facility] [phone]  Reference #:[id number]   Time:25:40    Authorization dates: N/A

## 2020-06-23 NOTE — TELEPHONE ENCOUNTER
Patient returned call, denies use of listed ASA 325mg and Celebrex. Patient indicates she uses ASA 81mg nightly, advised to hold tonight. Patient verbalized understanding, no further needs.     Dona  PRE-PROCEDURE INSTRUCTIONS FOR DANTE HERBAL SUPPLEMENTS  5 days  Fish oil, krill oil, Omega-3, vitamin E, Turmeric, Garlic                            Insurance Authorization:   Most insurances are now requiring a preauthorization for all procedures.   In the event that your in

## 2020-06-24 ENCOUNTER — OFFICE VISIT (OUTPATIENT)
Dept: PAIN CLINIC | Facility: CLINIC | Age: 55
End: 2020-06-24
Payer: COMMERCIAL

## 2020-06-24 VITALS — WEIGHT: 200 LBS | BODY MASS INDEX: 33.32 KG/M2 | HEIGHT: 65 IN

## 2020-06-24 DIAGNOSIS — M70.62 TROCHANTERIC BURSITIS, LEFT HIP: Primary | ICD-10-CM

## 2020-06-24 PROCEDURE — 76942 ECHO GUIDE FOR BIOPSY: CPT | Performed by: ANESTHESIOLOGY

## 2020-06-24 PROCEDURE — 20610 DRAIN/INJ JOINT/BURSA W/O US: CPT | Performed by: ANESTHESIOLOGY

## 2020-06-24 RX ORDER — LIDOCAINE HYDROCHLORIDE 10 MG/ML
5 INJECTION, SOLUTION INFILTRATION; PERINEURAL ONCE
Status: COMPLETED | OUTPATIENT
Start: 2020-06-24 | End: 2020-06-24

## 2020-06-24 RX ORDER — BUPIVACAINE HYDROCHLORIDE 5 MG/ML
9 INJECTION, SOLUTION EPIDURAL; INTRACAUDAL ONCE
Status: COMPLETED | OUTPATIENT
Start: 2020-06-24 | End: 2020-06-24

## 2020-06-24 RX ORDER — METHYLPREDNISOLONE ACETATE 40 MG/ML
40 INJECTION, SUSPENSION INTRA-ARTICULAR; INTRALESIONAL; INTRAMUSCULAR; SOFT TISSUE ONCE
Status: COMPLETED | OUTPATIENT
Start: 2020-06-24 | End: 2020-06-24

## 2020-06-24 NOTE — PROGRESS NOTES
Patient presents in office today with reported pain in lower back, left thigh     Current pain level reported = 3/10

## 2020-06-24 NOTE — PROCEDURES
Date of procedure: June 24, 2020    Preop Diagnosis: Left trochanteric bursitis postop diagnosis: Same    Procedure: Left trochanteric bursa injection with ultrasound guidance    Surgeon: Dami Postal    Anesthesia: Local    EBL: 0    Indication: The patient Third and final attempt to return call

## 2020-06-24 NOTE — TELEPHONE ENCOUNTER
Case placed on OR schedule     Discussed need for COVID testing and self quarantine requirements after test. Patient stated she works weekends and will need to check with her boss and will update office when she is for injections today.  She also stated she

## 2020-06-24 NOTE — TELEPHONE ENCOUNTER
Patient advised  is now performing elective procedures, patient agreeable to rescheduling.         Patient advised they will be required to have a COVID test 72 hours prior to procedure, advised CS/PAT will reach out to them tonight or tomorrow to schedul ? Check in at [facility] ([address] [place] [address]. [address] [place] [address] [place] outpatient registration in the [employer]. ? Please note-No prescriptions will be written by Pain Clinic in OR on the day of procedure.  If you require a refill of medications, please c Most insurances are now requiring a preauthorization for all procedures. In the event that your insurance does not authorize your procedure within 48 hours of the scheduled date, your procedure will be cancelled and rescheduled to a later date.   Please c

## 2020-06-24 NOTE — TELEPHONE ENCOUNTER
Spoke with patient to schedule injections. She stated she will call office back to schedule from her work phone.

## 2020-06-25 NOTE — TELEPHONE ENCOUNTER
Discussed with Dr Ana Paula Hidalgo to do RAPID COVID testing for patient day of procedure 6/30/20    Patient was informed of the above recommendations.  Advised patient to check in an hour early at 10:30 AM to complete RAPID test.  She verbalized understanding and

## 2020-06-26 RX ORDER — SODIUM CHLORIDE, SODIUM LACTATE, POTASSIUM CHLORIDE, CALCIUM CHLORIDE 600; 310; 30; 20 MG/100ML; MG/100ML; MG/100ML; MG/100ML
INJECTION, SOLUTION INTRAVENOUS CONTINUOUS
Status: CANCELLED | OUTPATIENT
Start: 2020-06-26

## 2020-06-30 ENCOUNTER — APPOINTMENT (OUTPATIENT)
Dept: GENERAL RADIOLOGY | Facility: HOSPITAL | Age: 55
End: 2020-06-30
Attending: ANESTHESIOLOGY
Payer: COMMERCIAL

## 2020-06-30 ENCOUNTER — HOSPITAL ENCOUNTER (OUTPATIENT)
Facility: HOSPITAL | Age: 55
Setting detail: HOSPITAL OUTPATIENT SURGERY
Discharge: HOME | End: 2020-06-30
Attending: ANESTHESIOLOGY | Admitting: ANESTHESIOLOGY
Payer: COMMERCIAL

## 2020-06-30 VITALS
HEART RATE: 70 BPM | TEMPERATURE: 98 F | SYSTOLIC BLOOD PRESSURE: 125 MMHG | OXYGEN SATURATION: 93 % | DIASTOLIC BLOOD PRESSURE: 69 MMHG | RESPIRATION RATE: 18 BRPM

## 2020-06-30 DIAGNOSIS — M51.26 LUMBAR DISC HERNIATION: ICD-10-CM

## 2020-06-30 DIAGNOSIS — M70.62 TROCHANTERIC BURSITIS, LEFT HIP: ICD-10-CM

## 2020-06-30 DIAGNOSIS — Z01.818 PRE-OP TESTING: Primary | ICD-10-CM

## 2020-06-30 LAB
GLUCOSE BLD-MCNC: 254 MG/DL (ref 70–99)
SARS-COV-2 RNA RESP QL NAA+PROBE: NOT DETECTED

## 2020-06-30 PROCEDURE — 82962 GLUCOSE BLOOD TEST: CPT

## 2020-06-30 PROCEDURE — 3E0R33Z INTRODUCTION OF ANTI-INFLAMMATORY INTO SPINAL CANAL, PERCUTANEOUS APPROACH: ICD-10-PCS | Performed by: ANESTHESIOLOGY

## 2020-06-30 RX ORDER — DIPHENHYDRAMINE HYDROCHLORIDE 50 MG/ML
50 INJECTION INTRAMUSCULAR; INTRAVENOUS ONCE AS NEEDED
Status: DISCONTINUED | OUTPATIENT
Start: 2020-06-30 | End: 2020-06-30

## 2020-06-30 RX ORDER — DEXTROSE MONOHYDRATE 25 G/50ML
50 INJECTION, SOLUTION INTRAVENOUS
Status: DISCONTINUED | OUTPATIENT
Start: 2020-06-30 | End: 2020-06-30

## 2020-06-30 RX ORDER — ONDANSETRON 2 MG/ML
4 INJECTION INTRAMUSCULAR; INTRAVENOUS ONCE AS NEEDED
Status: DISCONTINUED | OUTPATIENT
Start: 2020-06-30 | End: 2020-06-30

## 2020-06-30 RX ORDER — INSULIN ASPART 100 [IU]/ML
3 INJECTION, SOLUTION INTRAVENOUS; SUBCUTANEOUS ONCE
Status: COMPLETED | OUTPATIENT
Start: 2020-06-30 | End: 2020-06-30

## 2020-06-30 RX ORDER — LIDOCAINE HYDROCHLORIDE 10 MG/ML
INJECTION, SOLUTION EPIDURAL; INFILTRATION; INTRACAUDAL; PERINEURAL AS NEEDED
Status: DISCONTINUED | OUTPATIENT
Start: 2020-06-30 | End: 2020-06-30

## 2020-06-30 RX ORDER — INSULIN ASPART 100 [IU]/ML
INJECTION, SOLUTION INTRAVENOUS; SUBCUTANEOUS
Status: COMPLETED
Start: 2020-06-30 | End: 2020-06-30

## 2020-06-30 RX ORDER — DEXAMETHASONE SODIUM PHOSPHATE 10 MG/ML
INJECTION, SOLUTION INTRAMUSCULAR; INTRAVENOUS AS NEEDED
Status: DISCONTINUED | OUTPATIENT
Start: 2020-06-30 | End: 2020-06-30

## 2020-06-30 RX ORDER — 0.9 % SODIUM CHLORIDE 0.9 %
VIAL (ML) INJECTION AS NEEDED
Status: DISCONTINUED | OUTPATIENT
Start: 2020-06-30 | End: 2020-06-30

## 2020-06-30 RX ORDER — SODIUM CHLORIDE, SODIUM LACTATE, POTASSIUM CHLORIDE, CALCIUM CHLORIDE 600; 310; 30; 20 MG/100ML; MG/100ML; MG/100ML; MG/100ML
100 INJECTION, SOLUTION INTRAVENOUS CONTINUOUS
Status: DISCONTINUED | OUTPATIENT
Start: 2020-06-30 | End: 2020-06-30

## 2020-06-30 NOTE — H&P
History & Physical Examination    Patient Name: Nicolas Riedel  MRN: [id number]  CSN: [id number]  YOB: 1965    Pre-Operative Diagnosis:  Trochanteric bursitis, left hip [M70.62]  Lumbar disc herniation [M51.26]    Present Illness: Patient with nightly., Disp: , Rfl: 0, Taking  TRULICITY 1.5 CZ/5.6OH Subcutaneous Solution Pen-injector, Inject 0.5 mL into the skin once a week., Disp: , Rfl: 5, Taking  metFORMIN HCl  MG Oral Tablet 24 Hr, Take 1,500 mg by mouth nightly., Disp: , Rfl: , Taking frequency: Never      SYSTEM Check if Review is Normal Check if Physical Exam is Normal If not normal, please explain:   HEENT [x ] [x ]    NECK & BACK [x ] [x ]    HEART [x ] [x ]    LUNGS [x ] [x ]    ABDOMEN [x ] [x ]    UROGENITAL [x ] [x ]    EXTREMIT

## 2020-06-30 NOTE — OPERATIVE REPORT
[facility]  Operative Report  2020     Nicolas Riedel Patient Status:  Hospital Outpatient Surgery    1965 MRN [id number]   [facility] ENDOSCOPY Attending Bimal Rico MD   Hosp Day # 0 PCP May Zapien MD     Indication: Phani Balderrama cc of normal saline with 10 mg of dexamethasone was injected without complication. The needle was withdrawn with stylet in situ. The patient tolerated procedure very well. The patient was observed until discharge criteria met.   Discharge instructions wer

## 2020-07-29 ENCOUNTER — OFFICE VISIT (OUTPATIENT)
Dept: PAIN CLINIC | Facility: CLINIC | Age: 55
End: 2020-07-29
Payer: COMMERCIAL

## 2020-07-29 VITALS — HEIGHT: 65 IN | BODY MASS INDEX: 33.32 KG/M2 | WEIGHT: 200 LBS

## 2020-07-29 DIAGNOSIS — M51.26 LUMBAR DISC HERNIATION: ICD-10-CM

## 2020-07-29 DIAGNOSIS — M54.16 LUMBAR RADICULOPATHY: Primary | ICD-10-CM

## 2020-07-29 DIAGNOSIS — M70.62 TROCHANTERIC BURSITIS, LEFT HIP: ICD-10-CM

## 2020-07-29 PROCEDURE — 99214 OFFICE O/P EST MOD 30 MIN: CPT | Performed by: ANESTHESIOLOGY

## 2020-07-29 PROCEDURE — 3008F BODY MASS INDEX DOCD: CPT | Performed by: ANESTHESIOLOGY

## 2020-07-29 RX ORDER — TRAMADOL HYDROCHLORIDE 50 MG/1
50 TABLET ORAL EVERY 8 HOURS PRN
Qty: 45 TABLET | Refills: 0 | Status: SHIPPED | OUTPATIENT
Start: 2020-07-29 | End: 2020-10-22

## 2020-07-29 RX ORDER — PREGABALIN 50 MG/1
50 CAPSULE ORAL 2 TIMES DAILY
Qty: 60 CAPSULE | Refills: 0 | Status: SHIPPED | OUTPATIENT
Start: 2020-07-29 | End: 2020-09-10

## 2020-07-29 NOTE — PROGRESS NOTES
Last procedure: LEFT LUMBAR 4-5, LUMBAR 5-SACRAL 1 TRANSFORAMINAL EPIDURAL STEROID INJECCTION WITH LOCAL   Date: 6/30/2020  Percentage of relief obtained: 0%  Duration of relief: None    Patient presents in office today with reported pain in left leg, behi

## 2020-07-29 NOTE — PROGRESS NOTES
Name: Johnny Jackson   : 1965   DOS: 2020     Pain Clinic Follow Up Visit:   Patient presents with:  [facility] F/U      Johnny Jackson is a 63year old female with history of left-sided low back pain with radiation down the leg.   He is f Oral Tablet 24 Hr Take 1,500 mg by mouth nightly. • atorvastatin 40 MG Oral Tab Take 40 mg by mouth. At Bedtime  1   • lisinopril (PRINIVIL,ZESTRIL) 20 MG Oral Tab Take 20 mg by mouth daily.            EXAM:   Ht 65\"   Wt 200 lb (90.7 kg)   BMI 33.28 k activity modification, and  PT. 1.Risks of long term narcotic use d/w pt including dependence, abuse, and death from overdose and mixing narcotic with alcohol. Pt verbalized understanding.      Medications filled today:  Requested Prescriptions     Signed

## 2020-09-10 RX ORDER — PREGABALIN 50 MG/1
50 CAPSULE ORAL 2 TIMES DAILY
Qty: 60 CAPSULE | Refills: 0 | Status: SHIPPED | OUTPATIENT
Start: 2020-09-10 | End: 2021-03-22

## 2020-09-10 NOTE — TELEPHONE ENCOUNTER
Medication: pregabalin 50 MG Oral Cap    Date of last refill: 7/29/2020  [place] 88/19/6186    Last office visit: 7/29/2020  Due back to clinic per last office note:  Not Indicated  Date next office visit scheduled:  Non Sig: Take 1 capsule (50 mg total) by mouth 2 (two) times daily.    • traMADol HCl 50 MG Oral Tab 45 tablet 0       Sig: Take 1 tablet (50 mg total) by mouth every 8 (eight) hours as needed for Pain.      Orders:No orders of the defined types were placed in

## 2020-10-14 ENCOUNTER — LAB ENCOUNTER (OUTPATIENT)
Dept: LAB | Age: 55
End: 2020-10-14
Attending: PREVENTIVE MEDICINE

## 2020-10-14 ENCOUNTER — TELEPHONE (OUTPATIENT)
Dept: INTERNAL MEDICINE CLINIC | Facility: HOSPITAL | Age: 55
End: 2020-10-14

## 2020-10-14 DIAGNOSIS — Z20.822 SUSPECTED 2019 NOVEL CORONAVIRUS INFECTION: Primary | ICD-10-CM

## 2020-10-14 DIAGNOSIS — Z20.822 SUSPECTED 2019 NOVEL CORONAVIRUS INFECTION: ICD-10-CM

## 2020-10-22 ENCOUNTER — TELEPHONE (OUTPATIENT)
Dept: PAIN CLINIC | Facility: CLINIC | Age: 55
End: 2020-10-22

## 2020-10-22 ENCOUNTER — OFFICE VISIT (OUTPATIENT)
Dept: PAIN CLINIC | Facility: CLINIC | Age: 55
End: 2020-10-22
Payer: COMMERCIAL

## 2020-10-22 VITALS
HEART RATE: 84 BPM | WEIGHT: 205 LBS | DIASTOLIC BLOOD PRESSURE: 78 MMHG | BODY MASS INDEX: 34.16 KG/M2 | OXYGEN SATURATION: 95 % | SYSTOLIC BLOOD PRESSURE: 120 MMHG | HEIGHT: 65 IN

## 2020-10-22 DIAGNOSIS — M54.16 LUMBAR RADICULOPATHY: Primary | ICD-10-CM

## 2020-10-22 PROCEDURE — 3008F BODY MASS INDEX DOCD: CPT | Performed by: ANESTHESIOLOGY

## 2020-10-22 PROCEDURE — 99214 OFFICE O/P EST MOD 30 MIN: CPT | Performed by: ANESTHESIOLOGY

## 2020-10-22 PROCEDURE — 3074F SYST BP LT 130 MM HG: CPT | Performed by: ANESTHESIOLOGY

## 2020-10-22 PROCEDURE — 3078F DIAST BP <80 MM HG: CPT | Performed by: ANESTHESIOLOGY

## 2020-10-22 NOTE — PROGRESS NOTES
Patient presents in office today with reported pain in right hip, thighs    Current pain level reported = 3/10    Last reported dose of traMADol HCl 50 MG Oral Tab pt states one month prior to OV

## 2020-10-22 NOTE — TELEPHONE ENCOUNTER
Medical clearance needed- No    Implanted cardiac device/SCS/PNS: NA    Pt seen in OV today by Dr. Lisa Oliver and recommended for TLESI (X 1). Please begin PA process for procedure(s).      Laterality: Left  Level(s): L5-S1, S1-S2    Pt informed callback will be

## 2020-10-26 NOTE — TELEPHONE ENCOUNTER
Prior authorization request completed for: Left L5-S2 TLESI   Authorization #No Prior Authorization/Nor Pre Determination is Required. -No Exclusions   Spoke with Naa at [address] [place] [address] [phone]  Reference #:[id number]   Time:14:39    Authorization dates:

## 2020-10-26 NOTE — PROGRESS NOTES
Name: Mireille Kim   : 1965   DOS: 10/22/2020     Pain Clinic Follow Up Visit:   Mireille Kim is a 63year old female who presents for recheck of her chronic low back pain. The patient is being followed with Dr. Vivien Bay.   The patient had tr Tablet 24 Hr Take 750 mg by mouth daily with breakfast.     • metFORMIN HCl  MG Oral Tablet 24 Hr Take 1,500 mg by mouth nightly. • atorvastatin 40 MG Oral Tab Take 40 mg by mouth.  At Bedtime  1   • lisinopril (PRINIVIL,ZESTRIL) 20 MG Oral Tab Ta

## 2020-10-27 ENCOUNTER — TELEPHONE (OUTPATIENT)
Dept: INTERNAL MEDICINE CLINIC | Facility: HOSPITAL | Age: 55
End: 2020-10-27

## 2020-10-27 DIAGNOSIS — Z20.822 SUSPECTED 2019 NOVEL CORONAVIRUS INFECTION: Primary | ICD-10-CM

## 2020-10-28 ENCOUNTER — LAB ENCOUNTER (OUTPATIENT)
Dept: LAB | Age: 55
End: 2020-10-28
Attending: PREVENTIVE MEDICINE
Payer: COMMERCIAL

## 2020-10-28 DIAGNOSIS — Z20.822 SUSPECTED 2019 NOVEL CORONAVIRUS INFECTION: ICD-10-CM

## 2020-11-02 NOTE — TELEPHONE ENCOUNTER
Patient advised of insurance approval to proceed with injections and is agreeable to scheduling. Patient scheduled for procedure, pre-procedure instructions reviewed.  Patient elects to have no sedation to avoid having to self-isolate after COVID test. Revi Number of days you need to be off for the following medications:  ? Aggrenox 10 days   ? Agrylin (Anagrelide) 10 days  ? Brilinta (Ticagrelor) 7 days  ? Imbruvica (Ibrutinib) 3 days   ? Enbrel (Etanercept) 24 hours   ? Fragmin (Dalteparin) 24 hours   ?  Pl Please call our office with any questions or concerns before or after your procedure at  [phone].   If you are a diabetic, please increase the frequency of your glucose monitoring after the procedure as this may cause a temporary increase in your blood

## 2020-11-06 ENCOUNTER — HOSPITAL ENCOUNTER (OUTPATIENT)
Dept: MRI IMAGING | Facility: HOSPITAL | Age: 55
Discharge: HOME | End: 2020-11-06
Attending: INTERNAL MEDICINE
Payer: COMMERCIAL

## 2020-11-06 DIAGNOSIS — R41.3 MEMORY LOSS: ICD-10-CM

## 2020-11-06 DIAGNOSIS — R26.89 LOSS OF BALANCE: ICD-10-CM

## 2020-11-06 PROCEDURE — 70553 MRI BRAIN STEM W/O & W/DYE: CPT | Performed by: INTERNAL MEDICINE

## 2020-11-06 PROCEDURE — A9575 INJ GADOTERATE MEGLUMI 0.1ML: HCPCS

## 2020-11-16 ENCOUNTER — HOSPITAL ENCOUNTER (OUTPATIENT)
Facility: HOSPITAL | Age: 55
Setting detail: HOSPITAL OUTPATIENT SURGERY
Discharge: HOME | End: 2020-11-16
Attending: ANESTHESIOLOGY | Admitting: ANESTHESIOLOGY
Payer: COMMERCIAL

## 2020-11-16 ENCOUNTER — APPOINTMENT (OUTPATIENT)
Dept: GENERAL RADIOLOGY | Facility: HOSPITAL | Age: 55
End: 2020-11-16
Attending: ANESTHESIOLOGY
Payer: COMMERCIAL

## 2020-11-16 VITALS
OXYGEN SATURATION: 95 % | TEMPERATURE: 98 F | RESPIRATION RATE: 17 BRPM | DIASTOLIC BLOOD PRESSURE: 82 MMHG | SYSTOLIC BLOOD PRESSURE: 135 MMHG | HEART RATE: 72 BPM

## 2020-11-16 DIAGNOSIS — M54.16 LUMBAR RADICULOPATHY: ICD-10-CM

## 2020-11-16 PROCEDURE — 82962 GLUCOSE BLOOD TEST: CPT

## 2020-11-16 PROCEDURE — 3E0R3BZ INTRODUCTION OF ANESTHETIC AGENT INTO SPINAL CANAL, PERCUTANEOUS APPROACH: ICD-10-PCS | Performed by: ANESTHESIOLOGY

## 2020-11-16 PROCEDURE — 3E0R33Z INTRODUCTION OF ANTI-INFLAMMATORY INTO SPINAL CANAL, PERCUTANEOUS APPROACH: ICD-10-PCS | Performed by: ANESTHESIOLOGY

## 2020-11-16 RX ORDER — DEXTROSE MONOHYDRATE 25 G/50ML
50 INJECTION, SOLUTION INTRAVENOUS
Status: DISCONTINUED | OUTPATIENT
Start: 2020-11-16 | End: 2020-11-16

## 2020-11-16 RX ORDER — SODIUM CHLORIDE, SODIUM LACTATE, POTASSIUM CHLORIDE, CALCIUM CHLORIDE 600; 310; 30; 20 MG/100ML; MG/100ML; MG/100ML; MG/100ML
100 INJECTION, SOLUTION INTRAVENOUS CONTINUOUS
Status: DISCONTINUED | OUTPATIENT
Start: 2020-11-16 | End: 2020-11-16

## 2020-11-16 RX ORDER — LIDOCAINE HYDROCHLORIDE 10 MG/ML
INJECTION, SOLUTION EPIDURAL; INFILTRATION; INTRACAUDAL; PERINEURAL AS NEEDED
Status: DISCONTINUED | OUTPATIENT
Start: 2020-11-16 | End: 2020-11-16

## 2020-11-16 RX ORDER — DEXAMETHASONE SODIUM PHOSPHATE 10 MG/ML
INJECTION, SOLUTION INTRAMUSCULAR; INTRAVENOUS AS NEEDED
Status: DISCONTINUED | OUTPATIENT
Start: 2020-11-16 | End: 2020-11-16

## 2020-11-16 RX ORDER — DIPHENHYDRAMINE HYDROCHLORIDE 50 MG/ML
50 INJECTION INTRAMUSCULAR; INTRAVENOUS ONCE AS NEEDED
Status: DISCONTINUED | OUTPATIENT
Start: 2020-11-16 | End: 2020-11-16

## 2020-11-16 RX ORDER — ONDANSETRON 2 MG/ML
4 INJECTION INTRAMUSCULAR; INTRAVENOUS ONCE AS NEEDED
Status: DISCONTINUED | OUTPATIENT
Start: 2020-11-16 | End: 2020-11-16

## 2020-11-16 RX ORDER — INSULIN ASPART 100 [IU]/ML
3 INJECTION, SOLUTION INTRAVENOUS; SUBCUTANEOUS ONCE
Status: DISCONTINUED | OUTPATIENT
Start: 2020-11-16 | End: 2020-11-16

## 2020-11-16 NOTE — H&P
History & Physical Examination    Patient Name: Thomas Myers  MRN: [id number]  CSN: [id number]  YOB: 1965    Pre-Operative Diagnosis:  Lumbar radiculopathy [M54.16]    Present Illness: 71-year-old female patient with chronic low back pain fa tab 4 tablet, 4 tablet, Oral, Q15 Min PRN    Or    •  dextrose 50 % injection 50 mL, 50 mL, Intravenous, Q15 Min PRN    Or    •  glucose (DEX4) oral liquid 30 g, 30 g, Oral, Q15 Min PRN    Or    •  Glucose-Vitamin C (DEX-4) chewable tab 8 tablet, 8 tablet, understand and agree to proceed with plan of care. [ x ] I have reviewed the History and Physical done within the last 30 days. Any changes noted above.     Kvng Castro MD

## 2020-11-16 NOTE — OPERATIVE REPORT
[facility]  Operative Report  2020     Brook Back Patient Status:  Hospital Outpatient Surgery    1965 MRN [id number]   [facility] ENDOSCOPY Attending Lawosn Parada MD   Hosp Day # 0 PCP Geraldo Kim MD     Indicatio space at this level. The needle position was confirmed under AP and lateral fluoroscopic view. Following negative aspiration for CSF and blood, approximately 1 mL of Omnipaque 240 was injected.   An excellent contrast spread along the epidural space and t

## 2020-12-04 ENCOUNTER — TELEPHONE (OUTPATIENT)
Dept: PAIN CLINIC | Facility: CLINIC | Age: 55
End: 2020-12-04

## 2020-12-04 DIAGNOSIS — M70.62 TROCHANTERIC BURSITIS OF LEFT HIP: Primary | ICD-10-CM

## 2020-12-04 NOTE — TELEPHONE ENCOUNTER
Spoke with patient to advise, Dr. Moriah Yates last [address] note from 10/22 does not make mention of a trochanter injection therefore he will be contacted to advise on recommendation and then PA will need to be obtained.  If he does recommend and inj and once PA is re

## 2020-12-04 NOTE — TELEPHONE ENCOUNTER
ASSESSMENT AND PLAN:   Lumbar radiculopathy  Lumbar facet arthropathy  Sacroiliitis  Meralgia paresthetica     My plan is to bring her back for left transforaminal lumbar epidural steroid injection at L5-S1 and S1-S2 level.   Risk and benefits of the proced

## 2020-12-04 NOTE — TELEPHONE ENCOUNTER
Pt calling to schedule a second injection but this time it would be In the trochanter. Pt states that Dr. Juani Carlisle knows this one is necessary. Please advise.

## 2020-12-07 ENCOUNTER — PATIENT MESSAGE (OUTPATIENT)
Dept: PAIN CLINIC | Facility: CLINIC | Age: 55
End: 2020-12-07

## 2020-12-08 ENCOUNTER — TELEPHONE (OUTPATIENT)
Dept: PAIN CLINIC | Facility: CLINIC | Age: 55
End: 2020-12-08

## 2020-12-08 RX ORDER — TRAMADOL HYDROCHLORIDE 50 MG/1
50 TABLET ORAL EVERY 12 HOURS PRN
Qty: 60 TABLET | Refills: 0 | Status: SHIPPED | OUTPATIENT
Start: 2020-12-08 | End: 2021-03-22

## 2020-12-08 NOTE — TELEPHONE ENCOUNTER
Medical clearance needed- No    Implanted cardiac device/SCS/PNS: NA    Pt requesting inj with Dr. Jolanta Kendall. Dr. Jolanta Kendall was asked to advise on pts request and recommendd for greater troch bursa (X 1). Please begin PA process for procedure(s).      Laterality: le

## 2020-12-08 NOTE — TELEPHONE ENCOUNTER
Patient called wanting to know if she can be given a short script for Tramadol for pain.  Patient stated she is not sleeping at night due to the pain    Patient states she has tried Tramadol in the past in July 2020 and it helped     Patient stated she sche

## 2020-12-08 NOTE — TELEPHONE ENCOUNTER
Lawson Parada MD  You 2 days ago     You can schedule her for a left greater trochanteric bursa injection.  I have seen the patient for first time and did a transforaminal injection.  The patient is being followed with Dr. Elsa Bose.     Message text       Y

## 2020-12-08 NOTE — TELEPHONE ENCOUNTER
Spoke to patient advised of insurance approval to proceed with injection and is agreeable to scheduling.  Patient was advised if she would like to have the injection in office we can authorize it that way but she will not be able to get an appt until 12/31 ? Please note-No prescriptions will be written by Pain Clinic in [place] on the day of procedure. If you require a refill of medications, please contact the office 48 hours prior to your procedure.   ? If you have an implanted Spinal Cord or Peripheral Nerve Sti Please contact your insurance carrier to determine what your financial responsibility will be for the procedure(s).       Cancellation/Rescheduling Appointment:   In the event you need to cancel or reschedule your appointment, you must notify the office 24

## 2020-12-08 NOTE — TELEPHONE ENCOUNTER
Prior authorization request completed for: Luci Person 56 #No Prior Authorization/Nor Pre Determination is Required   -No Exclusions or Restrictions   Spoke with Arti at [address] [place] [address] [phone]  Reference #:[id number]   Time:19

## 2020-12-08 NOTE — TELEPHONE ENCOUNTER
From: Jayant Saunders  To: Diane Groves MD  Sent: 12/7/2020 1:16 PM CST  Subject: Visit Follow-up Question    I left a message last week. I think the back injection worked very well. The bursa pain on my left trochanter is very high still.  Maybe more so

## 2020-12-09 NOTE — TELEPHONE ENCOUNTER
Mireya Groves MD  You 9 hours ago (10:40 PM)     Marylu Cary I have sent a prescription for tramadol for this patient.     Message text

## 2020-12-09 NOTE — TELEPHONE ENCOUNTER
Left a detailed message on patient's confidential voicemail informing below .      TraMADol HCl 50 MG Oral Tab sent to [facility] in [facility]     Receipt confirmed by pharmacy (12/8/2020 10:39 PM CST)

## 2020-12-14 ENCOUNTER — HOSPITAL ENCOUNTER (OUTPATIENT)
Facility: HOSPITAL | Age: 55
Setting detail: HOSPITAL OUTPATIENT SURGERY
Discharge: HOME | End: 2020-12-14
Attending: ANESTHESIOLOGY | Admitting: ANESTHESIOLOGY
Payer: COMMERCIAL

## 2020-12-14 ENCOUNTER — APPOINTMENT (OUTPATIENT)
Dept: GENERAL RADIOLOGY | Facility: HOSPITAL | Age: 55
End: 2020-12-14
Attending: ANESTHESIOLOGY
Payer: COMMERCIAL

## 2020-12-14 VITALS
RESPIRATION RATE: 18 BRPM | TEMPERATURE: 98 F | SYSTOLIC BLOOD PRESSURE: 121 MMHG | OXYGEN SATURATION: 98 % | HEART RATE: 74 BPM | DIASTOLIC BLOOD PRESSURE: 78 MMHG

## 2020-12-14 DIAGNOSIS — M70.62 TROCHANTERIC BURSITIS OF LEFT HIP: ICD-10-CM

## 2020-12-14 PROCEDURE — 3E0U3NZ INTRODUCTION OF ANALGESICS, HYPNOTICS, SEDATIVES INTO JOINTS, PERCUTANEOUS APPROACH: ICD-10-PCS | Performed by: ANESTHESIOLOGY

## 2020-12-14 PROCEDURE — 77002 NEEDLE LOCALIZATION BY XRAY: CPT | Performed by: ANESTHESIOLOGY

## 2020-12-14 PROCEDURE — 3E0U33Z INTRODUCTION OF ANTI-INFLAMMATORY INTO JOINTS, PERCUTANEOUS APPROACH: ICD-10-PCS | Performed by: ANESTHESIOLOGY

## 2020-12-14 PROCEDURE — 82962 GLUCOSE BLOOD TEST: CPT

## 2020-12-14 RX ORDER — INSULIN ASPART 100 [IU]/ML
3 INJECTION, SOLUTION INTRAVENOUS; SUBCUTANEOUS ONCE
Status: DISCONTINUED | OUTPATIENT
Start: 2020-12-14 | End: 2020-12-14

## 2020-12-14 RX ORDER — ONDANSETRON 2 MG/ML
4 INJECTION INTRAMUSCULAR; INTRAVENOUS ONCE AS NEEDED
Status: DISCONTINUED | OUTPATIENT
Start: 2020-12-14 | End: 2020-12-14

## 2020-12-14 RX ORDER — METHYLPREDNISOLONE ACETATE 40 MG/ML
INJECTION, SUSPENSION INTRA-ARTICULAR; INTRALESIONAL; INTRAMUSCULAR; SOFT TISSUE AS NEEDED
Status: DISCONTINUED | OUTPATIENT
Start: 2020-12-14 | End: 2020-12-14

## 2020-12-14 RX ORDER — DEXTROSE MONOHYDRATE 25 G/50ML
50 INJECTION, SOLUTION INTRAVENOUS
Status: DISCONTINUED | OUTPATIENT
Start: 2020-12-14 | End: 2020-12-14

## 2020-12-14 RX ORDER — DIPHENHYDRAMINE HYDROCHLORIDE 50 MG/ML
50 INJECTION INTRAMUSCULAR; INTRAVENOUS ONCE AS NEEDED
Status: DISCONTINUED | OUTPATIENT
Start: 2020-12-14 | End: 2020-12-14

## 2020-12-14 RX ORDER — LIDOCAINE HYDROCHLORIDE 10 MG/ML
INJECTION, SOLUTION EPIDURAL; INFILTRATION; INTRACAUDAL; PERINEURAL AS NEEDED
Status: DISCONTINUED | OUTPATIENT
Start: 2020-12-14 | End: 2020-12-14

## 2020-12-14 NOTE — H&P
History & Physical Examination    Patient Name: India Amaya  MRN: [id number]  CSN: [id number]  YOB: 1965    Pre-Operative Diagnosis:  Trochanteric bursitis of left hip [M70.62]    Present Illness: 58-year-old female patient with chronic hip ABDOMEN [x ] [x ]    UROGENITAL [x ] [x ]    EXTREMITIES [x ] [x ]    OTHER        [ x ] I have discussed the risks and benefits and alternatives with the patient/family. They understand and agree to proceed with plan of care.   [ x ] I have reviewed the

## 2020-12-15 NOTE — OPERATIVE REPORT
[facility]  Operative Report  2020     Jayant Saunders Patient Status:  Hospital Outpatient Surgery    1965 MRN [id number]   [facility] ENDOSCOPY Attending No att. providers found   Hosp Day # 0 PCP MD Dirk Black Complications: None. Follow up: The patient will be followed in the pain clinic as needed basis.     Anna Guidry MD

## 2021-01-04 ENCOUNTER — IMMUNIZATION (OUTPATIENT)
Dept: LAB | Facility: HOSPITAL | Age: 56
End: 2021-01-04
Attending: PREVENTIVE MEDICINE

## 2021-01-04 DIAGNOSIS — Z23 NEED FOR VACCINATION: ICD-10-CM

## 2021-01-04 PROCEDURE — 0011A SARSCOV2 VAC 100MCG/0.5ML IM: CPT

## 2021-01-22 ENCOUNTER — PATIENT MESSAGE (OUTPATIENT)
Dept: PAIN CLINIC | Facility: CLINIC | Age: 56
End: 2021-01-22

## 2021-01-22 NOTE — TELEPHONE ENCOUNTER
From: Pop Bobo  To: Speedy Armstrong MD  Sent: 1/22/2021 2:34 PM CST  Subject: Non-Urgent Medical Question    The steroid injections have been more effective this time. My bursa pain is becoming more challenging again.  I still am unsure why after a l

## 2021-01-26 ENCOUNTER — NURSE ONLY (OUTPATIENT)
Dept: LAB | Facility: HOSPITAL | Age: 56
End: 2021-01-26
Attending: PREVENTIVE MEDICINE
Payer: COMMERCIAL

## 2021-01-26 ENCOUNTER — TELEPHONE (OUTPATIENT)
Dept: INTERNAL MEDICINE CLINIC | Facility: HOSPITAL | Age: 56
End: 2021-01-26

## 2021-01-26 DIAGNOSIS — Z20.822 SUSPECTED COVID-19 VIRUS INFECTION: ICD-10-CM

## 2021-01-26 DIAGNOSIS — Z20.822 SUSPECTED COVID-19 VIRUS INFECTION: Primary | ICD-10-CM

## 2021-01-26 LAB — SARS-COV-2 AG RESP QL IA.RAPID: NOT DETECTED

## 2021-01-26 PROCEDURE — 87426 SARSCOV CORONAVIRUS AG IA: CPT

## 2021-01-26 NOTE — TELEPHONE ENCOUNTER
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     H location:     When was the last shift you worked? 1/24/2021  When are you next scheduled to work?  1/27/2021    Did you have close contact with someone on your unit while not wearing a mask? (e.g., during meal breaks):  Yes []   No [x]    If yes, who:   Do employee voiced understanding  Encouraged to call PCP for further support.

## 2021-01-26 NOTE — TELEPHONE ENCOUNTER
Results and RTW guidelines:    COVID RESULT GIVEN:      Test type:    [] Rapid         [x]       [x] NEGATIVE    Ordered  retest?  []Yes   [x] No        Notes:HA pretty severe,  Otherwise Asymptomatic and afebrile.   Informational message sen immediately, continue to monitor for sx  [x] RTW when sx improve- fever free for 24 hours w/o medications, Diarrhea/Vomiting for 24 hours w/o medications  [x]  ordered; continue to monitor sx and call for new/worsening sx.   Discuss RTW guidelines with

## 2021-01-26 NOTE — TELEPHONE ENCOUNTER
Aloysius Klinefelter, MD Gatha Smiles, RN 2 hours ago (8:22 AM)     Letty Marcial, it is okay for her to go for physical therapy.  If her soreness does not get better please tell her to make an appointment and see me.     Message text

## 2021-01-28 ENCOUNTER — LAB ENCOUNTER (OUTPATIENT)
Dept: LAB | Facility: HOSPITAL | Age: 56
End: 2021-01-28
Attending: PREVENTIVE MEDICINE

## 2021-01-28 DIAGNOSIS — Z20.822 SUSPECTED COVID-19 VIRUS INFECTION: ICD-10-CM

## 2021-01-29 LAB — SARS-COV-2 RNA RESP QL NAA+PROBE: NOT DETECTED

## 2021-02-02 ENCOUNTER — IMMUNIZATION (OUTPATIENT)
Dept: LAB | Facility: HOSPITAL | Age: 56
End: 2021-02-02
Attending: PREVENTIVE MEDICINE
Payer: COMMERCIAL

## 2021-02-02 DIAGNOSIS — Z23 NEED FOR VACCINATION: Primary | ICD-10-CM

## 2021-02-02 PROCEDURE — 0012A SARSCOV2 VAC 100MCG/0.5ML IM: CPT

## 2021-05-07 ENCOUNTER — LAB ENCOUNTER (OUTPATIENT)
Dept: LAB | Facility: HOSPITAL | Age: 56
End: 2021-05-07
Attending: INTERNAL MEDICINE
Payer: COMMERCIAL

## 2021-05-07 DIAGNOSIS — Z00.00 ROUTINE GENERAL MEDICAL EXAMINATION AT A HEALTH CARE FACILITY: Primary | ICD-10-CM

## 2021-05-07 PROCEDURE — 82570 ASSAY OF URINE CREATININE: CPT

## 2021-05-07 PROCEDURE — 80061 LIPID PANEL: CPT

## 2021-05-07 PROCEDURE — 36415 COLL VENOUS BLD VENIPUNCTURE: CPT

## 2021-05-07 PROCEDURE — 85025 COMPLETE CBC W/AUTO DIFF WBC: CPT

## 2021-05-07 PROCEDURE — 83036 HEMOGLOBIN GLYCOSYLATED A1C: CPT

## 2021-05-07 PROCEDURE — 80053 COMPREHEN METABOLIC PANEL: CPT

## 2021-05-07 PROCEDURE — 84443 ASSAY THYROID STIM HORMONE: CPT

## 2021-05-07 PROCEDURE — 82043 UR ALBUMIN QUANTITATIVE: CPT

## 2021-05-07 PROCEDURE — 82306 VITAMIN D 25 HYDROXY: CPT

## 2021-05-13 ENCOUNTER — PATIENT MESSAGE (OUTPATIENT)
Dept: PAIN CLINIC | Facility: CLINIC | Age: 56
End: 2021-05-13

## 2021-05-14 NOTE — TELEPHONE ENCOUNTER
From: Tj Brooks  To: Fransico Gil MD  Sent: 5/13/2021 4:15 PM CDT  Subject: Non-Urgent Medical Question    I am in a lot of pain again. The bursa seemed to do better for a while but it terrible now. Lower back pain shooting pain to leg. . left most

## 2021-05-25 ENCOUNTER — OFFICE VISIT (OUTPATIENT)
Dept: PAIN CLINIC | Facility: CLINIC | Age: 56
End: 2021-05-25
Payer: COMMERCIAL

## 2021-05-25 VITALS — DIASTOLIC BLOOD PRESSURE: 86 MMHG | SYSTOLIC BLOOD PRESSURE: 124 MMHG | HEART RATE: 80 BPM

## 2021-05-25 DIAGNOSIS — M70.62 GREATER TROCHANTERIC BURSITIS OF LEFT HIP: Primary | ICD-10-CM

## 2021-05-25 PROCEDURE — 3079F DIAST BP 80-89 MM HG: CPT | Performed by: ANESTHESIOLOGY

## 2021-05-25 PROCEDURE — 3074F SYST BP LT 130 MM HG: CPT | Performed by: ANESTHESIOLOGY

## 2021-05-25 PROCEDURE — 99215 OFFICE O/P EST HI 40 MIN: CPT | Performed by: ANESTHESIOLOGY

## 2021-05-25 RX ORDER — MELOXICAM 15 MG/1
15 TABLET ORAL DAILY
Qty: 30 TABLET | Refills: 0 | Status: SHIPPED | OUTPATIENT
Start: 2021-05-25 | End: 2021-06-18

## 2021-05-25 RX ORDER — DULAGLUTIDE 3 MG/.5ML
INJECTION, SOLUTION SUBCUTANEOUS
COMMUNITY
Start: 2021-05-24 | End: 2021-07-20

## 2021-05-25 RX ORDER — ROSUVASTATIN CALCIUM 20 MG/1
TABLET, COATED ORAL
COMMUNITY
Start: 2021-05-21

## 2021-05-25 NOTE — PROGRESS NOTES
Patient presents in office today with reported pain in left lower back, left hip, left leg.  Right hip    Current pain level reported = 6/10    Last reported dose of tylenol this morning

## 2021-05-28 NOTE — PROGRESS NOTES
Name: Farooq Bolanos   : 1965   DOS: 2021     Pain Clinic Follow Up Visit:   Farooq Bolanos is a 61year old female who presents for recheck of her chronic low back pain. The patient is being followed with Dr. Christine Gonzalez.   The patient had tr lamoTRIgine 200 MG Oral Tab Take 1 tablet (200 mg total) by mouth 2 (two) times daily. 180 tablet 1   • Glucose Blood (ACCU-CHEK GUIDE) In Vitro Strip by Other route.  Test twice daily     • Fluticasone Propionate 50 MCG/ACT Nasal Suspension USE 1 SPRAY IN encounter. Medications filled today:  Requested Prescriptions     Signed Prescriptions Disp Refills   • Meloxicam 15 MG Oral Tab 30 tablet 0     Sig: Take 1 tablet (15 mg total) by mouth daily.        Radiology orders and consultations:None    [facility]

## 2021-06-01 ENCOUNTER — TELEPHONE (OUTPATIENT)
Dept: PAIN CLINIC | Facility: CLINIC | Age: 56
End: 2021-06-01

## 2021-06-01 DIAGNOSIS — M70.62 TROCHANTERIC BURSITIS OF LEFT HIP: Primary | ICD-10-CM

## 2021-06-01 NOTE — TELEPHONE ENCOUNTER
Noted patient was seen in office with Dr KAYLI LAGUNAS 5/25/2021. Referral for Left greater trochanteric bursa injection under fluoroscopy placed on 5/28/2021.      Left a detailed message on patient's confidential voicemail (ok per verbal release) informing PA will

## 2021-06-01 NOTE — TELEPHONE ENCOUNTER
Patient called requesting an update on PA. Patient states injection was discussed at [address] [place] [address] on 5/25.     Please advise

## 2021-06-02 NOTE — TELEPHONE ENCOUNTER
Prior authorization request completed for: Left Greater Troch Bursa Injection   Authorization #No Prior Authorization/Nor Pre Determination is Required   Fidelina with Kwame at [facility] [phone]  Reference #:[id number]   Time:22:10    Authorization myrna

## 2021-06-02 NOTE — TELEPHONE ENCOUNTER
Question Answer Comment   Anesthesia Type Sedation    Provider Jerris Cranker    Location Lab    Procedure Other (please add comment)    Implants No    Medical clearance requested (will send to Pain Navigator) No    Patient has Medicare coverage?  No    Comments (Ple

## 2021-06-07 ENCOUNTER — PATIENT MESSAGE (OUTPATIENT)
Dept: PAIN CLINIC | Facility: CLINIC | Age: 56
End: 2021-06-07

## 2021-06-07 NOTE — TELEPHONE ENCOUNTER
From: Lester Odell  To: Adam Fisher MD  Sent: 6/7/2021 1:20 PM CDT  Subject: Other    I have a steroid injection scheduled for this Wednesday. With the hot weather my leg feels better.  Should I go ahead with the appointment anyways or reschedule whe

## 2021-06-08 NOTE — TELEPHONE ENCOUNTER
From: Lisandra Espinal  To: Hector Sloan MD  Sent: 6/7/2021 6:11 PM CDT  Subject: Other    Thank you for the information. I will cancel the injection appointment. I am unable to do this on line and scheduling office is closed.  Are you able to cancel for

## 2021-06-18 DIAGNOSIS — M51.36 DDD (DEGENERATIVE DISC DISEASE), LUMBAR: ICD-10-CM

## 2021-06-18 DIAGNOSIS — M70.62 GREATER TROCHANTERIC BURSITIS OF LEFT HIP: Primary | ICD-10-CM

## 2021-06-18 RX ORDER — MELOXICAM 15 MG/1
TABLET ORAL
Qty: 30 TABLET | Refills: 0 | Status: SHIPPED | OUTPATIENT
Start: 2021-06-23

## 2021-06-18 NOTE — TELEPHONE ENCOUNTER
Medication: Meloxicam 15 MG Oral Tab    Date of last refill: 05/25/21    Last office visit: 05/25/21  Due back to clinic per last office note:  na  Date next office visit scheduled:  none      Last OV note recommendation:   ASSESSMENT AND PLAN:   Left grea

## 2021-09-29 ENCOUNTER — ORDER TRANSCRIPTION (OUTPATIENT)
Dept: PHYSICAL THERAPY | Facility: HOSPITAL | Age: 56
End: 2021-09-29

## 2021-09-29 DIAGNOSIS — M54.50 LOW BACK PAIN: ICD-10-CM

## 2021-09-29 DIAGNOSIS — M25.552 LEFT HIP PAIN: Primary | ICD-10-CM

## 2021-12-08 ENCOUNTER — TELEPHONE (OUTPATIENT)
Dept: INTERNAL MEDICINE CLINIC | Facility: HOSPITAL | Age: 56
End: 2021-12-08

## 2021-12-08 DIAGNOSIS — Z20.822 EXPOSURE TO CONFIRMED CASE OF COVID-19: Primary | ICD-10-CM

## 2021-12-08 NOTE — TELEPHONE ENCOUNTER
Department:  Adult Unit                            [] [id number]  [x]JUAN   [] [address] [facility]     Dept Manager/Supervisor/team or clinical lead: Stephany Murcia    Position:  [] MD     [] RN     [x] Respiratory Therapist     [] PCT     [] PSR      [] Other     HAVE YOU [address] [place] [address] last shift you worked? 12/07/2021  When are you next scheduled to work? 12/09/2021    Did you have close contact with someone on your unit while not wearing a mask? (e.g., during meal breaks):  Yes []   No [x]    If yes, who:   Do you share a workspace?  [place] next 14 days. Test w/ [facility] 3-5 days after exposure.                                          COVID-19 testing ordered: [] Rapid    [x] [facility]    Date test is to be taken:    12/10/2021    []  Outside testing       [x] Manager notified    INSTRUCTION

## 2021-12-09 ENCOUNTER — NURSE ONLY (OUTPATIENT)
Dept: LAB | Facility: HOSPITAL | Age: 56
End: 2021-12-09
Attending: PREVENTIVE MEDICINE
Payer: COMMERCIAL

## 2021-12-09 DIAGNOSIS — Z20.822 EXPOSURE TO CONFIRMED CASE OF COVID-19: ICD-10-CM

## 2021-12-11 NOTE — TELEPHONE ENCOUNTER
Results and RTW guidelines:    COVID RESULT:    [] Viewed by employee in [facility]. RTW plan and instructions as indicated on triage call. Manager notified. Estimated RTW date:   [x] Discussed with employee   [] Unable to reach by phone.   Sent via [employer] - If outside testing completed, bring a copy of result to RTW appointment      Notes: Asymptomatic.  Will call with any new covid Sx.    RTW PLAN:    [] RTW 10 days with clearance from [facility]- call for appt 1-2 days prior to RTW date OR when feeling well

## 2021-12-17 ENCOUNTER — HOSPITAL ENCOUNTER (OUTPATIENT)
Dept: MAMMOGRAPHY | Age: 56
Discharge: HOME | End: 2021-12-17
Attending: INTERNAL MEDICINE
Payer: COMMERCIAL

## 2021-12-17 ENCOUNTER — HOSPITAL ENCOUNTER (OUTPATIENT)
Dept: BONE DENSITY | Age: 56
Discharge: HOME | End: 2021-12-17
Attending: INTERNAL MEDICINE
Payer: COMMERCIAL

## 2021-12-17 DIAGNOSIS — Z78.0 MENOPAUSE: ICD-10-CM

## 2021-12-17 DIAGNOSIS — Z12.31 BREAST CANCER SCREENING BY MAMMOGRAM: ICD-10-CM

## 2021-12-17 PROCEDURE — 77080 DXA BONE DENSITY AXIAL: CPT | Performed by: INTERNAL MEDICINE

## 2021-12-17 PROCEDURE — 77067 SCR MAMMO BI INCL CAD: CPT | Performed by: INTERNAL MEDICINE

## 2021-12-17 PROCEDURE — 77063 BREAST TOMOSYNTHESIS BI: CPT | Performed by: INTERNAL MEDICINE

## 2022-01-01 ENCOUNTER — TELEPHONE (OUTPATIENT)
Dept: INTERNAL MEDICINE CLINIC | Facility: HOSPITAL | Age: 57
End: 2022-01-01

## 2022-01-01 DIAGNOSIS — Z20.822 SUSPECTED COVID-19 VIRUS INFECTION: Primary | ICD-10-CM

## 2022-01-01 NOTE — TELEPHONE ENCOUNTER
Department: Adult [place]                                 [] [facility]  [x]JUAN   [] [facility]    Dept Manager/Supervisor/team or clinical lead: Geroge Cabot    Position:  [] MD     [x] RN     [] Respiratory Therapist     [] PCT     [] PSR      [] Ritchie Kuo     [] MA [] Other - 1/1/2022  When are you next scheduled to work? 1/3/2022    Did you have close contact with someone on your unit while not wearing a mask? (e.g., during meal breaks):  Yes []   No [x]    If yes, who:   Do you share a workspace?  Yes [x]   No []       If yes, asymptomatic  []  If symptoms develop, stay home and call hotline for rapid test order  []  If COVID positive results, off work minimum of 5 days from positive test or onset of symptoms (day 0)    []      On day 5, if asymptomatic or mildly symptomatic (wi

## 2022-01-02 ENCOUNTER — NURSE ONLY (OUTPATIENT)
Dept: LAB | Facility: HOSPITAL | Age: 57
End: 2022-01-02
Attending: PREVENTIVE MEDICINE
Payer: COMMERCIAL

## 2022-01-02 DIAGNOSIS — E11.9 DM TYPE 2 (DIABETES MELLITUS, TYPE 2) (HCC): Primary | ICD-10-CM

## 2022-01-02 DIAGNOSIS — Z20.822 SUSPECTED COVID-19 VIRUS INFECTION: ICD-10-CM

## 2022-01-02 LAB
ANION GAP SERPL CALC-SCNC: 8 MMOL/L (ref 0–18)
BILIRUB UR QL STRIP.AUTO: NEGATIVE
BUN BLD-MCNC: 15 MG/DL (ref 7–18)
CALCIUM BLD-MCNC: 9.8 MG/DL (ref 8.5–10.1)
CHLORIDE SERPL-SCNC: 104 MMOL/L (ref 98–112)
CO2 SERPL-SCNC: 28 MMOL/L (ref 21–32)
COLOR UR AUTO: YELLOW
CREAT BLD-MCNC: 0.66 MG/DL
EST. AVERAGE GLUCOSE BLD GHB EST-MCNC: 157 MG/DL (ref 68–126)
FASTING STATUS PATIENT QL REPORTED: YES
GLUCOSE BLD-MCNC: 123 MG/DL (ref 70–99)
GLUCOSE UR STRIP.AUTO-MCNC: NEGATIVE MG/DL
HBA1C MFR BLD: 7.1 % (ref ?–5.7)
KETONES UR STRIP.AUTO-MCNC: NEGATIVE MG/DL
LEUKOCYTE ESTERASE UR QL STRIP.AUTO: NEGATIVE
NITRITE UR QL STRIP.AUTO: POSITIVE
OSMOLALITY SERPL CALC.SUM OF ELEC: 292 MOSM/KG (ref 275–295)
PH UR STRIP.AUTO: 6 [PH] (ref 5–8)
POTASSIUM SERPL-SCNC: 4.4 MMOL/L (ref 3.5–5.1)
PROT UR STRIP.AUTO-MCNC: NEGATIVE MG/DL
RBC UR QL AUTO: NEGATIVE
SARS-COV-2 RNA RESP QL NAA+PROBE: NOT DETECTED
SODIUM SERPL-SCNC: 140 MMOL/L (ref 136–145)
SP GR UR STRIP.AUTO: 1.02 (ref 1–1.03)
UROBILINOGEN UR STRIP.AUTO-MCNC: <2 MG/DL

## 2022-01-02 PROCEDURE — 36415 COLL VENOUS BLD VENIPUNCTURE: CPT

## 2022-01-02 PROCEDURE — 81001 URINALYSIS AUTO W/SCOPE: CPT

## 2022-01-02 PROCEDURE — 87186 SC STD MICRODIL/AGAR DIL: CPT

## 2022-01-02 PROCEDURE — 80048 BASIC METABOLIC PNL TOTAL CA: CPT

## 2022-01-02 PROCEDURE — 83036 HEMOGLOBIN GLYCOSYLATED A1C: CPT

## 2022-01-02 PROCEDURE — 87077 CULTURE AEROBIC IDENTIFY: CPT

## 2022-01-02 PROCEDURE — 87086 URINE CULTURE/COLONY COUNT: CPT

## 2022-01-02 NOTE — TELEPHONE ENCOUNTER
Results and RTW guidelines:    COVID RESULT:    [x] Viewed by employee in [facility]. RTW plan and instructions as indicated on triage call. Manager notified. Estimated RTW date: 1/2/2022  [] Discussed with employee   [] Unable to reach by phone.   Sent v

## 2022-01-09 ENCOUNTER — TELEPHONE (OUTPATIENT)
Dept: INTERNAL MEDICINE CLINIC | Facility: HOSPITAL | Age: 57
End: 2022-01-09

## 2022-01-09 NOTE — TELEPHONE ENCOUNTER
Outside Covid Testing done  Awaiting results    Results and RTW guidelines:    COVID RESULT reported:      Test type:    [x] Rapid outside         [] PCR outside       [] Home Test    Date of test: 1/5/2022     Test location: [facility] Still has a fever, vomiting or diarrhea   - Keep communication open with management about RTW and if symptoms worsen     Notes:     RTW PLAN:    []  If COVID positive results, off work minimum of 5 days from positive test or onset of symptoms (day 0)    [] Which vaccine:  Pfizer []     Maye [place] [x]    J&J []          Date of symptom onset: 1/5/2022    SYMPTOMS:  [] asymptomatic    • Fever > 100F               Yes []          • Cough                          Yes []      • Shortness of breath  Yes []      • C source. Patient currently with symptoms and testing. Will close encounter.

## 2022-01-10 ENCOUNTER — HOSPITAL ENCOUNTER (OUTPATIENT)
Dept: MAMMOGRAPHY | Facility: HOSPITAL | Age: 57
Discharge: HOME | End: 2022-01-10
Attending: INTERNAL MEDICINE
Payer: COMMERCIAL

## 2022-01-10 DIAGNOSIS — R92.2 INCONCLUSIVE MAMMOGRAM: ICD-10-CM

## 2022-01-10 PROCEDURE — 77065 DX MAMMO INCL CAD UNI: CPT | Performed by: INTERNAL MEDICINE

## 2022-01-10 PROCEDURE — 77061 BREAST TOMOSYNTHESIS UNI: CPT | Performed by: INTERNAL MEDICINE

## 2022-01-20 ENCOUNTER — TELEPHONE (OUTPATIENT)
Dept: INTERNAL MEDICINE CLINIC | Facility: HOSPITAL | Age: 57
End: 2022-01-20

## 2022-01-20 ENCOUNTER — NURSE ONLY (OUTPATIENT)
Dept: LAB | Facility: HOSPITAL | Age: 57
End: 2022-01-20
Attending: PREVENTIVE MEDICINE
Payer: COMMERCIAL

## 2022-01-20 DIAGNOSIS — Z20.822 SUSPECTED COVID-19 VIRUS INFECTION: Primary | ICD-10-CM

## 2022-01-20 DIAGNOSIS — Z20.822 SUSPECTED COVID-19 VIRUS INFECTION: ICD-10-CM

## 2022-01-20 LAB — SARS-COV-2 RNA RESP QL NAA+PROBE: NOT DETECTED

## 2022-01-20 NOTE — TELEPHONE ENCOUNTER
Results and RTW guidelines:    COVID RESULT:    [] Viewed by employee in [facility]. RTW plan and instructions as indicated on triage call. Manager notified. Estimated RTW date:   [x] Discussed with employee   [] Unable to reach by phone.   Sent via [employer] Employee will not be cleared if:    1. Has consistent cough, shortness of breath or fatigue that restricts your physical activities    2. Is still feeling \"unwell\"    3. Within 15 days of hospitalization for COVID    4.  Within 20 days of intubation for C plan/instructions  [x] Manager Notified

## 2022-01-20 NOTE — TELEPHONE ENCOUNTER
Department: adult unit                            [] [facility]  [x]JUAN   [] [address] [facility]    Dept Manager/Supervisor/team or clinical lead: Stephany Murcia    Position:  [] MD     [x] RN     [] Respiratory Therapist     [] PCT     [] PSR      [] Skyla Uribe     [] MA [] Other -    I to work? 1/21/2022  Did you have close contact with someone on your unit while not wearing a mask? (e.g., during meal breaks):  Yes []   No [x]    If yes, who:   Do you share a workspace? Yes []   No [x]       If yes, with whom?    Do you have any family mem symptoms) may return to work day 6  []      On day 5, if symptomatic, call Employee Health for RTW screening        [x]  Plan noted above  [x]  Length of time to obtain results  [x]  Quarantine instructions  [x]  S/S of worsening infection/condition and im

## 2022-01-21 NOTE — TELEPHONE ENCOUNTER
Had diarrhea last night and still feeling fatigue. Stated that his father who was with her last Sunday tested positive for Covid. [facility] test ordered. Instructed she has to be 24 hrs free of fever,nausea,vomiting and diarrhea without taking any medications

## 2022-01-22 ENCOUNTER — NURSE ONLY (OUTPATIENT)
Dept: LAB | Facility: HOSPITAL | Age: 57
End: 2022-01-22
Attending: PREVENTIVE MEDICINE
Payer: COMMERCIAL

## 2022-01-22 DIAGNOSIS — Z20.822 SUSPECTED COVID-19 VIRUS INFECTION: ICD-10-CM

## 2022-01-25 LAB — SARS-COV-2 RNA RESP QL NAA+PROBE: NOT DETECTED

## 2022-01-25 NOTE — TELEPHONE ENCOUNTER
Results and RTW guidelines:    COVID RESULT:    [x] Viewed by employee in [address] RTW plan and instructions as indicated on triage call. Manager notified. Estimated RTW date:   [] Discussed with employee   [] Unable to reach by phone.   Sent via [employer] Seek emergent care with worsening symptoms   - If employee is still experiencing severe symptoms on day 5 must make a RTW appt with Employee Health, Employee will not be cleared if:    1.  Has consistent cough, shortness of breath or fatigue that restricts Manager Notified

## 2022-02-04 ENCOUNTER — TELEPHONE (OUTPATIENT)
Dept: PHYSICAL THERAPY | Facility: HOSPITAL | Age: 57
End: 2022-02-04

## 2022-02-11 ENCOUNTER — IMMUNIZATION (OUTPATIENT)
Dept: LAB | Age: 57
End: 2022-02-11
Attending: EMERGENCY MEDICINE
Payer: COMMERCIAL

## 2022-02-11 DIAGNOSIS — Z23 NEED FOR VACCINATION: Primary | ICD-10-CM

## 2022-02-11 PROCEDURE — 0064A SARSCOV2 VAC 50MCG/0.25ML IM: CPT

## 2022-02-17 ENCOUNTER — TELEPHONE (OUTPATIENT)
Dept: PHYSICAL THERAPY | Facility: HOSPITAL | Age: 57
End: 2022-02-17

## 2022-02-17 ENCOUNTER — APPOINTMENT (OUTPATIENT)
Dept: PHYSICAL THERAPY | Age: 57
End: 2022-02-17
Attending: INTERNAL MEDICINE
Payer: COMMERCIAL

## 2022-02-21 ENCOUNTER — TELEPHONE (OUTPATIENT)
Dept: PHYSICAL THERAPY | Facility: HOSPITAL | Age: 57
End: 2022-02-21

## 2022-02-21 ENCOUNTER — OFFICE VISIT (OUTPATIENT)
Dept: PHYSICAL THERAPY | Age: 57
End: 2022-02-21
Attending: INTERNAL MEDICINE
Payer: COMMERCIAL

## 2022-02-21 DIAGNOSIS — M25.552 LEFT HIP PAIN: ICD-10-CM

## 2022-02-21 DIAGNOSIS — M54.50 LOW BACK PAIN, UNSPECIFIED BACK PAIN LATERALITY, UNSPECIFIED CHRONICITY, UNSPECIFIED WHETHER SCIATICA PRESENT: ICD-10-CM

## 2022-02-21 PROBLEM — F33.1 MAJOR DEPRESSIVE DISORDER, RECURRENT EPISODE, MODERATE (HCC): Status: ACTIVE | Noted: 2022-02-21

## 2022-02-21 PROBLEM — F41.0 PANIC DISORDER WITHOUT AGORAPHOBIA: Status: ACTIVE | Noted: 2022-02-21

## 2022-02-21 PROBLEM — F51.01 PRIMARY INSOMNIA: Status: ACTIVE | Noted: 2022-02-21

## 2022-02-21 PROBLEM — F42.9 OBSESSIVE-COMPULSIVE DISORDER: Status: ACTIVE | Noted: 2022-02-21

## 2022-02-21 PROCEDURE — 97161 PT EVAL LOW COMPLEX 20 MIN: CPT

## 2022-02-23 ENCOUNTER — OFFICE VISIT (OUTPATIENT)
Dept: NEUROLOGY | Facility: CLINIC | Age: 57
End: 2022-02-23
Payer: COMMERCIAL

## 2022-02-23 ENCOUNTER — APPOINTMENT (OUTPATIENT)
Dept: PHYSICAL THERAPY | Age: 57
End: 2022-02-23
Attending: INTERNAL MEDICINE
Payer: COMMERCIAL

## 2022-02-23 ENCOUNTER — TELEPHONE (OUTPATIENT)
Dept: PHYSICAL THERAPY | Facility: HOSPITAL | Age: 57
End: 2022-02-23

## 2022-02-23 VITALS
RESPIRATION RATE: 16 BRPM | HEIGHT: 65 IN | WEIGHT: 180 LBS | HEART RATE: 83 BPM | BODY MASS INDEX: 29.99 KG/M2 | DIASTOLIC BLOOD PRESSURE: 84 MMHG | SYSTOLIC BLOOD PRESSURE: 149 MMHG

## 2022-02-23 DIAGNOSIS — M54.2 CERVICALGIA: ICD-10-CM

## 2022-02-23 DIAGNOSIS — G43.009 MIGRAINE WITHOUT AURA AND WITHOUT STATUS MIGRAINOSUS, NOT INTRACTABLE: Primary | ICD-10-CM

## 2022-02-23 PROCEDURE — 3077F SYST BP >= 140 MM HG: CPT | Performed by: PHYSICIAN ASSISTANT

## 2022-02-23 PROCEDURE — 96372 THER/PROPH/DIAG INJ SC/IM: CPT | Performed by: PHYSICIAN ASSISTANT

## 2022-02-23 PROCEDURE — 99214 OFFICE O/P EST MOD 30 MIN: CPT | Performed by: PHYSICIAN ASSISTANT

## 2022-02-23 PROCEDURE — 3008F BODY MASS INDEX DOCD: CPT | Performed by: PHYSICIAN ASSISTANT

## 2022-02-23 PROCEDURE — 3079F DIAST BP 80-89 MM HG: CPT | Performed by: PHYSICIAN ASSISTANT

## 2022-02-23 RX ORDER — UBROGEPANT 100 MG/1
1 TABLET ORAL AS NEEDED
COMMUNITY
Start: 2021-11-01 | End: 2022-03-25

## 2022-02-23 RX ORDER — DEXAMETHASONE SODIUM PHOSPHATE 10 MG/ML
10 INJECTION, SOLUTION INTRAMUSCULAR; INTRAVENOUS ONCE
Status: COMPLETED | OUTPATIENT
Start: 2022-02-23 | End: 2022-02-23

## 2022-02-23 RX ORDER — ELETRIPTAN HYDROBROMIDE 40 MG/1
TABLET, FILM COATED ORAL
Qty: 8 TABLET | Refills: 1 | Status: SHIPPED | OUTPATIENT
Start: 2022-02-23 | End: 2022-03-25 | Stop reason: ALTCHOICE

## 2022-02-23 RX ORDER — ACETAMINOPHEN 160 MG
1 TABLET,DISINTEGRATING ORAL DAILY
COMMUNITY
Start: 2022-02-01

## 2022-02-23 RX ORDER — KETOROLAC TROMETHAMINE 30 MG/ML
30 INJECTION, SOLUTION INTRAMUSCULAR; INTRAVENOUS ONCE
Status: COMPLETED | OUTPATIENT
Start: 2022-02-23 | End: 2022-02-23

## 2022-02-23 RX ORDER — TOPIRAMATE 50 MG/1
TABLET, FILM COATED ORAL
Qty: 30 TABLET | Refills: 2 | Status: SHIPPED | OUTPATIENT
Start: 2022-02-23 | End: 2022-03-25

## 2022-02-23 RX ORDER — MELATONIN
1 DAILY
COMMUNITY
Start: 2022-02-01

## 2022-02-23 RX ORDER — ASCORBIC ACID 125 MG
1 TABLET,CHEWABLE ORAL DAILY
COMMUNITY
Start: 2022-02-01

## 2022-02-23 RX ADMIN — KETOROLAC TROMETHAMINE 30 MG: 30 INJECTION, SOLUTION INTRAMUSCULAR; INTRAVENOUS at 12:50:00

## 2022-02-23 RX ADMIN — DEXAMETHASONE SODIUM PHOSPHATE 10 MG: 10 INJECTION, SOLUTION INTRAMUSCULAR; INTRAVENOUS at 12:50:00

## 2022-02-23 NOTE — PROGRESS NOTES
Toradol 30mg and Decadron 10mg IM injections given per VORB. See MAR for admin details. Pt tolerated well.

## 2022-03-01 ENCOUNTER — HOSPITAL ENCOUNTER (EMERGENCY)
Facility: HOSPITAL | Age: 57
Discharge: LEFT BEFORE DISCHARGE | End: 2022-03-01
Payer: COMMERCIAL

## 2022-03-01 NOTE — ED QUICK NOTES
Patient coworker returned and states that they were suppose to take patient to employee health, not ED. Will dismiss patient off board.

## 2022-03-02 ENCOUNTER — PATIENT MESSAGE (OUTPATIENT)
Dept: NEUROLOGY | Facility: CLINIC | Age: 57
End: 2022-03-02

## 2022-03-02 ENCOUNTER — TELEPHONE (OUTPATIENT)
Dept: PHYSICAL THERAPY | Age: 57
End: 2022-03-02

## 2022-03-02 ENCOUNTER — APPOINTMENT (OUTPATIENT)
Dept: PHYSICAL THERAPY | Age: 57
End: 2022-03-02
Attending: INTERNAL MEDICINE
Payer: COMMERCIAL

## 2022-03-02 NOTE — TELEPHONE ENCOUNTER
Spoke to patient and ok to do the MRIs at [address] [place] [address]. Can we fax the orders to them. She has discontinued the topamax. Will wait to see how her headaches are over the next week and if they continue to be frequent can start on the verapamil. She did inform me that the injections of the toradol and dexamethasone did help her headache.

## 2022-03-02 NOTE — TELEPHONE ENCOUNTER
From: Mary Alice Jackson  To: RAD Geronimo  Sent: 3/2/2022 11:02 AM CST  Subject: MRI HEAD AND SPINE    [facility] will do an out of pocket fee of $350 for each procedure with a Rx. Their fax is [phone]. When I got the estimate from your place it was $1000 after insurance. Please advise    Also. Dr Haydee Jerez may have told you that I was acting very fogging on the Topamax. Work ended up sending me to the ER mid shift. While I was waiting on the Er lobby they realized I had a missing suboxone. I in fact gave it but did not swipe it through our [facility] system. I don't blame then for freaking out. So now I am suspended until the tox screen comes back. I don't know which is worse a horrible migraine or suspension for incredibly poor thinking.  Do I dare try verapimil

## 2022-03-04 ENCOUNTER — OFFICE VISIT (OUTPATIENT)
Dept: PHYSICAL THERAPY | Age: 57
End: 2022-03-04
Attending: INTERNAL MEDICINE
Payer: COMMERCIAL

## 2022-03-04 DIAGNOSIS — M25.552 LEFT HIP PAIN: ICD-10-CM

## 2022-03-04 DIAGNOSIS — M54.50 LEFT-SIDED LOW BACK PAIN WITHOUT SCIATICA, UNSPECIFIED CHRONICITY: ICD-10-CM

## 2022-03-04 PROCEDURE — 97110 THERAPEUTIC EXERCISES: CPT

## 2022-03-09 ENCOUNTER — OFFICE VISIT (OUTPATIENT)
Dept: PHYSICAL THERAPY | Age: 57
End: 2022-03-09
Attending: INTERNAL MEDICINE
Payer: COMMERCIAL

## 2022-03-09 DIAGNOSIS — M25.552 LEFT HIP PAIN: ICD-10-CM

## 2022-03-09 DIAGNOSIS — G89.29 CHRONIC LEFT-SIDED LOW BACK PAIN WITHOUT SCIATICA: ICD-10-CM

## 2022-03-09 DIAGNOSIS — M54.50 CHRONIC LEFT-SIDED LOW BACK PAIN WITHOUT SCIATICA: ICD-10-CM

## 2022-03-09 PROCEDURE — 97110 THERAPEUTIC EXERCISES: CPT

## 2022-03-11 ENCOUNTER — APPOINTMENT (OUTPATIENT)
Dept: PHYSICAL THERAPY | Age: 57
End: 2022-03-11
Attending: INTERNAL MEDICINE
Payer: COMMERCIAL

## 2022-03-11 ENCOUNTER — TELEPHONE (OUTPATIENT)
Dept: PHYSICAL THERAPY | Facility: HOSPITAL | Age: 57
End: 2022-03-11

## 2022-03-15 ENCOUNTER — LAB ENCOUNTER (OUTPATIENT)
Dept: LAB | Age: 57
End: 2022-03-15
Attending: INTERNAL MEDICINE
Payer: COMMERCIAL

## 2022-03-15 DIAGNOSIS — E55.9 VITAMIN D DEFICIENCY: ICD-10-CM

## 2022-03-15 DIAGNOSIS — Z00.00 ROUTINE GENERAL MEDICAL EXAMINATION AT A HEALTH CARE FACILITY: Primary | ICD-10-CM

## 2022-03-15 LAB
ALBUMIN SERPL-MCNC: 3.6 G/DL (ref 3.4–5)
ALBUMIN/GLOB SERPL: 1 {RATIO} (ref 1–2)
ALP LIVER SERPL-CCNC: 48 U/L
ALT SERPL-CCNC: 15 U/L
ANION GAP SERPL CALC-SCNC: 8 MMOL/L (ref 0–18)
AST SERPL-CCNC: 14 U/L (ref 15–37)
BASOPHILS # BLD AUTO: 0.11 X10(3) UL (ref 0–0.2)
BASOPHILS NFR BLD AUTO: 0.9 %
BILIRUB SERPL-MCNC: 0.2 MG/DL (ref 0.1–2)
BUN BLD-MCNC: 34 MG/DL (ref 7–18)
CALCIUM BLD-MCNC: 10.7 MG/DL (ref 8.5–10.1)
CHLORIDE SERPL-SCNC: 108 MMOL/L (ref 98–112)
CHOLEST SERPL-MCNC: 90 MG/DL (ref ?–200)
CO2 SERPL-SCNC: 23 MMOL/L (ref 21–32)
CREAT BLD-MCNC: 2.79 MG/DL
CREAT UR-SCNC: 54.2 MG/DL
EOSINOPHIL # BLD AUTO: 0.71 X10(3) UL (ref 0–0.7)
EOSINOPHIL NFR BLD AUTO: 5.7 %
ERYTHROCYTE [DISTWIDTH] IN BLOOD BY AUTOMATED COUNT: 13.8 %
EST. AVERAGE GLUCOSE BLD GHB EST-MCNC: 157 MG/DL (ref 68–126)
FASTING PATIENT LIPID ANSWER: YES
FASTING STATUS PATIENT QL REPORTED: YES
GLOBULIN PLAS-MCNC: 3.6 G/DL (ref 2.8–4.4)
GLUCOSE BLD-MCNC: 146 MG/DL (ref 70–99)
HBA1C MFR BLD: 7.1 % (ref ?–5.7)
HCT VFR BLD AUTO: 35.3 %
HDLC SERPL-MCNC: 33 MG/DL (ref 40–59)
HGB BLD-MCNC: 11.1 G/DL
IMM GRANULOCYTES # BLD AUTO: 0.07 X10(3) UL (ref 0–1)
IMM GRANULOCYTES NFR BLD: 0.6 %
LDLC SERPL CALC-MCNC: 26 MG/DL (ref ?–100)
LYMPHOCYTES # BLD AUTO: 2.92 X10(3) UL (ref 1–4)
LYMPHOCYTES NFR BLD AUTO: 23.6 %
MCH RBC QN AUTO: 29 PG (ref 26–34)
MCHC RBC AUTO-ENTMCNC: 31.4 G/DL (ref 31–37)
MCV RBC AUTO: 92.2 FL
MICROALBUMIN UR-MCNC: 9.57 MG/DL
MICROALBUMIN/CREAT 24H UR-RTO: 176.6 UG/MG (ref ?–30)
MONOCYTES # BLD AUTO: 1.47 X10(3) UL (ref 0.1–1)
MONOCYTES NFR BLD AUTO: 11.9 %
NEUTROPHILS # BLD AUTO: 7.1 X10 (3) UL (ref 1.5–7.7)
NEUTROPHILS # BLD AUTO: 7.1 X10(3) UL (ref 1.5–7.7)
NEUTROPHILS NFR BLD AUTO: 57.3 %
NONHDLC SERPL-MCNC: 57 MG/DL (ref ?–130)
OSMOLALITY SERPL CALC.SUM OF ELEC: 298 MOSM/KG (ref 275–295)
PLATELET # BLD AUTO: 380 10(3)UL (ref 150–450)
POTASSIUM SERPL-SCNC: 5.1 MMOL/L (ref 3.5–5.1)
PROT SERPL-MCNC: 7.2 G/DL (ref 6.4–8.2)
RBC # BLD AUTO: 3.83 X10(6)UL
SODIUM SERPL-SCNC: 139 MMOL/L (ref 136–145)
TRIGL SERPL-MCNC: 194 MG/DL (ref 30–149)
TSI SER-ACNC: 1.46 MIU/ML (ref 0.36–3.74)
VIT D+METAB SERPL-MCNC: 38 NG/ML (ref 30–100)
VLDLC SERPL CALC-MCNC: 25 MG/DL (ref 0–30)
WBC # BLD AUTO: 12.4 X10(3) UL (ref 4–11)

## 2022-03-15 PROCEDURE — 80061 LIPID PANEL: CPT

## 2022-03-15 PROCEDURE — 80053 COMPREHEN METABOLIC PANEL: CPT

## 2022-03-15 PROCEDURE — 84443 ASSAY THYROID STIM HORMONE: CPT

## 2022-03-15 PROCEDURE — 83036 HEMOGLOBIN GLYCOSYLATED A1C: CPT

## 2022-03-15 PROCEDURE — 3060F POS MICROALBUMINURIA REV: CPT | Performed by: FAMILY MEDICINE

## 2022-03-15 PROCEDURE — 85025 COMPLETE CBC W/AUTO DIFF WBC: CPT

## 2022-03-15 PROCEDURE — 3051F HG A1C>EQUAL 7.0%<8.0%: CPT | Performed by: FAMILY MEDICINE

## 2022-03-15 PROCEDURE — 82570 ASSAY OF URINE CREATININE: CPT

## 2022-03-15 PROCEDURE — 3061F NEG MICROALBUMINURIA REV: CPT | Performed by: FAMILY MEDICINE

## 2022-03-15 PROCEDURE — 82043 UR ALBUMIN QUANTITATIVE: CPT

## 2022-03-15 PROCEDURE — 82306 VITAMIN D 25 HYDROXY: CPT

## 2022-03-16 ENCOUNTER — OFFICE VISIT (OUTPATIENT)
Dept: PHYSICAL THERAPY | Age: 57
End: 2022-03-16
Attending: INTERNAL MEDICINE
Payer: COMMERCIAL

## 2022-03-16 DIAGNOSIS — M54.50 CHRONIC LEFT-SIDED LOW BACK PAIN WITHOUT SCIATICA: ICD-10-CM

## 2022-03-16 DIAGNOSIS — M25.552 LEFT HIP PAIN: ICD-10-CM

## 2022-03-16 DIAGNOSIS — G89.29 CHRONIC LEFT-SIDED LOW BACK PAIN WITHOUT SCIATICA: ICD-10-CM

## 2022-03-16 PROCEDURE — 97110 THERAPEUTIC EXERCISES: CPT

## 2022-03-17 ENCOUNTER — TELEPHONE (OUTPATIENT)
Dept: NEUROLOGY | Facility: CLINIC | Age: 57
End: 2022-03-17

## 2022-03-18 NOTE — TELEPHONE ENCOUNTER
Pt returned Tete Song [phone] phone call in regards to MRI Imaging. Pt concerned with waiting until Monday to hear back from [facility] Pavon so advised will let Dr. Queta Gardner know needs a return call. Call pt to discuss MRI results.

## 2022-03-18 NOTE — TELEPHONE ENCOUNTER
Spoke with the patient and her neck pain is really minor but she does have disc problems that we will go over and show it to her when she comes in.   As for the brain MRI we only see age related changes

## 2022-03-21 ENCOUNTER — PATIENT MESSAGE (OUTPATIENT)
Dept: NEUROLOGY | Facility: CLINIC | Age: 57
End: 2022-03-21

## 2022-03-21 ENCOUNTER — TELEPHONE (OUTPATIENT)
Dept: NEUROLOGY | Facility: CLINIC | Age: 57
End: 2022-03-21

## 2022-03-21 RX ORDER — METHYLPREDNISOLONE 4 MG/1
TABLET ORAL
Qty: 1 EACH | Refills: 0 | Status: SHIPPED | OUTPATIENT
Start: 2022-03-21 | End: 2022-04-01

## 2022-03-21 NOTE — TELEPHONE ENCOUNTER
Spoke to patient and having typical migraine but has not responded to the relpax. Offered injections in office but last time the injection only provided mild relief. Recommended trial of medrol dose pack. Patient instructed to call if no improvement in the headache. She will follow-up at the beginning of April to discuss her MRI results.

## 2022-03-21 NOTE — TELEPHONE ENCOUNTER
From: Kathy Gomez  To: RAD Sargent  Sent: 3/21/2022 9:46 AM CDT  Subject: Continuous migraine since Thursday. And MRI results    I am wondering if I could come in for an injection because I have had migraine since Thursday. I took the [address] and Sat and can't do it again now for a bit. I am throwing up again and just miserable. I also am looking forward to discussing my MRI results. Dr JAMES called Friday as it was your day off. (I was returning your call from Thursday) He told me my neck had many issues that were best discussed in person and he did not see evidence of MS. I will follow whatever you say to do next. I don't know if you think I should continue the verapamil Dr Henrry Pantoja ordered. Thank you for your help.  Oj Rios

## 2022-03-24 ENCOUNTER — HOSPITAL ENCOUNTER (OUTPATIENT)
Age: 57
Discharge: HOME | End: 2022-03-24
Attending: EMERGENCY MEDICINE
Payer: COMMERCIAL

## 2022-03-24 ENCOUNTER — PATIENT MESSAGE (OUTPATIENT)
Dept: NEUROLOGY | Facility: CLINIC | Age: 57
End: 2022-03-24

## 2022-03-24 VITALS
HEIGHT: 65 IN | WEIGHT: 175 LBS | HEART RATE: 85 BPM | RESPIRATION RATE: 18 BRPM | DIASTOLIC BLOOD PRESSURE: 85 MMHG | SYSTOLIC BLOOD PRESSURE: 142 MMHG | TEMPERATURE: 98 F | BODY MASS INDEX: 29.16 KG/M2 | OXYGEN SATURATION: 98 %

## 2022-03-24 DIAGNOSIS — R11.0 NAUSEA: ICD-10-CM

## 2022-03-24 DIAGNOSIS — G43.801 OTHER MIGRAINE WITH STATUS MIGRAINOSUS, NOT INTRACTABLE: Primary | ICD-10-CM

## 2022-03-24 PROCEDURE — 96374 THER/PROPH/DIAG INJ IV PUSH: CPT

## 2022-03-24 PROCEDURE — 96372 THER/PROPH/DIAG INJ SC/IM: CPT

## 2022-03-24 PROCEDURE — 96361 HYDRATE IV INFUSION ADD-ON: CPT

## 2022-03-24 PROCEDURE — 99204 OFFICE O/P NEW MOD 45 MIN: CPT

## 2022-03-24 PROCEDURE — 96375 TX/PRO/DX INJ NEW DRUG ADDON: CPT

## 2022-03-24 PROCEDURE — 99214 OFFICE O/P EST MOD 30 MIN: CPT

## 2022-03-24 RX ORDER — METOCLOPRAMIDE HYDROCHLORIDE 5 MG/ML
10 INJECTION INTRAMUSCULAR; INTRAVENOUS ONCE
Status: COMPLETED | OUTPATIENT
Start: 2022-03-24 | End: 2022-03-24

## 2022-03-24 RX ORDER — KETOROLAC TROMETHAMINE 30 MG/ML
15 INJECTION, SOLUTION INTRAMUSCULAR; INTRAVENOUS ONCE
Status: COMPLETED | OUTPATIENT
Start: 2022-03-24 | End: 2022-03-24

## 2022-03-24 RX ORDER — METOCLOPRAMIDE 10 MG/1
10 TABLET ORAL 3 TIMES DAILY PRN
Qty: 20 TABLET | Refills: 0 | Status: SHIPPED | OUTPATIENT
Start: 2022-03-24 | End: 2022-04-23

## 2022-03-24 RX ORDER — DIPHENHYDRAMINE HYDROCHLORIDE 50 MG/ML
25 INJECTION INTRAMUSCULAR; INTRAVENOUS ONCE
Status: COMPLETED | OUTPATIENT
Start: 2022-03-24 | End: 2022-03-24

## 2022-03-24 RX ORDER — METHYLPREDNISOLONE SODIUM SUCCINATE 125 MG/2ML
125 INJECTION, POWDER, LYOPHILIZED, FOR SOLUTION INTRAMUSCULAR; INTRAVENOUS ONCE
Status: COMPLETED | OUTPATIENT
Start: 2022-03-24 | End: 2022-03-24

## 2022-03-24 NOTE — ED QUICK NOTES
Patient states nausea /vomiting much improved. verbal and pleasant. Calling Dad for ride home. Tolerating 240 ml apple juice without emesis.

## 2022-03-24 NOTE — TELEPHONE ENCOUNTER
From: Cindy Lindsay  To: RAD Rodríguez  Sent: 3/24/2022 7:28 AM CDT  Subject: Vomiting    I started the medrol dose pack on monday. I should have called yesterday, but I vomited all day. I did not have any head pain, only the vomiting. compazine was not helping. I was trying to make it through my second day back at work. I had medicine cups of water throughout the shift but never kept anything down. I took elitriptan last night at 7pm and started feeling a little better. But would still vomit with water intake. I am supposing I need fluids desperately by now. Not really sure if you want me to do something other. But I was going to go to our walk in in [facility]. Any other directions? Desperate as always.  Maryam Mccoy

## 2022-03-24 NOTE — ED INITIAL ASSESSMENT (HPI)
Patient presents to IC with severe nausea and vomiting x 2 days. Has been undergoing migraine therapy per neurology. Dry heaves. Pale

## 2022-03-24 NOTE — TELEPHONE ENCOUNTER
Spoke with patient and she will proceed to the immediate care in [facility].

## 2022-03-25 ENCOUNTER — TELEPHONE (OUTPATIENT)
Dept: FAMILY MEDICINE CLINIC | Facility: CLINIC | Age: 57
End: 2022-03-25

## 2022-03-25 ENCOUNTER — LAB ENCOUNTER (OUTPATIENT)
Dept: LAB | Age: 57
End: 2022-03-25
Attending: FAMILY MEDICINE
Payer: COMMERCIAL

## 2022-03-25 ENCOUNTER — APPOINTMENT (OUTPATIENT)
Dept: PHYSICAL THERAPY | Age: 57
End: 2022-03-25
Attending: INTERNAL MEDICINE
Payer: COMMERCIAL

## 2022-03-25 ENCOUNTER — OFFICE VISIT (OUTPATIENT)
Dept: FAMILY MEDICINE CLINIC | Facility: CLINIC | Age: 57
End: 2022-03-25
Payer: COMMERCIAL

## 2022-03-25 VITALS
DIASTOLIC BLOOD PRESSURE: 62 MMHG | BODY MASS INDEX: 29.19 KG/M2 | HEART RATE: 76 BPM | HEIGHT: 64 IN | TEMPERATURE: 98 F | SYSTOLIC BLOOD PRESSURE: 92 MMHG | RESPIRATION RATE: 18 BRPM | WEIGHT: 171 LBS

## 2022-03-25 DIAGNOSIS — E11.65 UNCONTROLLED TYPE 2 DIABETES MELLITUS WITH HYPERGLYCEMIA (HCC): ICD-10-CM

## 2022-03-25 DIAGNOSIS — E78.2 MIXED HYPERLIPIDEMIA: ICD-10-CM

## 2022-03-25 DIAGNOSIS — I10 ESSENTIAL HYPERTENSION WITH GOAL BLOOD PRESSURE LESS THAN 130/80: ICD-10-CM

## 2022-03-25 DIAGNOSIS — Z12.11 SCREENING FOR MALIGNANT NEOPLASM OF COLON: ICD-10-CM

## 2022-03-25 DIAGNOSIS — N17.9 AKI (ACUTE KIDNEY INJURY) (HCC): ICD-10-CM

## 2022-03-25 DIAGNOSIS — G43.909 ACUTE MIGRAINE: Primary | ICD-10-CM

## 2022-03-25 LAB
ALBUMIN SERPL-MCNC: 4.1 G/DL (ref 3.4–5)
ALBUMIN/GLOB SERPL: 1.1 {RATIO} (ref 1–2)
ALP LIVER SERPL-CCNC: 44 U/L
ALT SERPL-CCNC: 33 U/L
ANION GAP SERPL CALC-SCNC: 8 MMOL/L (ref 0–18)
AST SERPL-CCNC: 50 U/L (ref 15–37)
BILIRUB SERPL-MCNC: 0.4 MG/DL (ref 0.1–2)
BUN BLD-MCNC: 36 MG/DL (ref 7–18)
CALCIUM BLD-MCNC: 10.4 MG/DL (ref 8.5–10.1)
CO2 SERPL-SCNC: 23 MMOL/L (ref 21–32)
FASTING STATUS PATIENT QL REPORTED: NO
GLOBULIN PLAS-MCNC: 3.9 G/DL (ref 2.8–4.4)
OSMOLALITY SERPL CALC.SUM OF ELEC: 296 MOSM/KG (ref 275–295)
POTASSIUM SERPL-SCNC: 3.3 MMOL/L (ref 3.5–5.1)
PROT SERPL-MCNC: 8 G/DL (ref 6.4–8.2)
SODIUM SERPL-SCNC: 137 MMOL/L (ref 136–145)

## 2022-03-25 PROCEDURE — 3008F BODY MASS INDEX DOCD: CPT | Performed by: FAMILY MEDICINE

## 2022-03-25 PROCEDURE — 80053 COMPREHEN METABOLIC PANEL: CPT

## 2022-03-25 PROCEDURE — 99215 OFFICE O/P EST HI 40 MIN: CPT | Performed by: FAMILY MEDICINE

## 2022-03-25 PROCEDURE — 3078F DIAST BP <80 MM HG: CPT | Performed by: FAMILY MEDICINE

## 2022-03-25 PROCEDURE — 3074F SYST BP LT 130 MM HG: CPT | Performed by: FAMILY MEDICINE

## 2022-03-25 RX ORDER — ZOLMITRIPTAN 5 MG/1
1 SPRAY NASAL AS NEEDED
Qty: 6 EACH | Refills: 2 | Status: SHIPPED | OUTPATIENT
Start: 2022-03-25

## 2022-03-25 RX ORDER — ONDANSETRON 8 MG/1
8 TABLET, ORALLY DISINTEGRATING ORAL EVERY 8 HOURS PRN
Qty: 30 TABLET | Refills: 1 | OUTPATIENT
Start: 2022-03-25

## 2022-03-25 NOTE — TELEPHONE ENCOUNTER
Call back to darleen/pharmacist-advised of first part of dr manning's response. She sts pt asked her if zofran order was received from the other provider. Darleen sts received no other zofran orders. Advised of dr manning's order noted below. Darleen repeats correctly, no other questions, voices thanks.   Medication record updated

## 2022-03-25 NOTE — TELEPHONE ENCOUNTER
Patient told me she already had a prescription for Zofran that was being filled by another physician that she was picking up today.   If this is not the case, please send her Zofran 8 mg ODT with 1 tab every 8 hours as needed and #30 with 1 refill

## 2022-03-25 NOTE — TELEPHONE ENCOUNTER
Call from darleen/pharmacist/[facility]-sts pt there now, stating she had OV today w dr manning for migraine, discussed nausea and was told he would send order for zofran. sts received zomig order but no zofran order. Advised will forward to dr manning now. Will Gayle will update pt. **see above and advise-thanks!

## 2022-03-28 RX ORDER — KETOROLAC TROMETHAMINE 30 MG/ML
30 INJECTION, SOLUTION INTRAMUSCULAR; INTRAVENOUS ONCE
Status: SHIPPED | OUTPATIENT
Start: 2022-03-25 | End: 2022-03-26

## 2022-03-29 ENCOUNTER — HOSPITAL ENCOUNTER (INPATIENT)
Facility: HOSPITAL | Age: 57
LOS: 1 days | Discharge: HOME | End: 2022-04-01
Attending: EMERGENCY MEDICINE | Admitting: HOSPITALIST
Payer: COMMERCIAL

## 2022-03-29 ENCOUNTER — OFFICE VISIT (OUTPATIENT)
Dept: NEPHROLOGY | Facility: CLINIC | Age: 57
End: 2022-03-29
Payer: COMMERCIAL

## 2022-03-29 ENCOUNTER — LAB ENCOUNTER (OUTPATIENT)
Dept: LAB | Facility: HOSPITAL | Age: 57
End: 2022-03-29
Attending: INTERNAL MEDICINE
Payer: COMMERCIAL

## 2022-03-29 VITALS — DIASTOLIC BLOOD PRESSURE: 58 MMHG | WEIGHT: 172.13 LBS | BODY MASS INDEX: 30 KG/M2 | SYSTOLIC BLOOD PRESSURE: 92 MMHG

## 2022-03-29 DIAGNOSIS — N17.9 AKI (ACUTE KIDNEY INJURY) (HCC): ICD-10-CM

## 2022-03-29 DIAGNOSIS — N17.9 AKI (ACUTE KIDNEY INJURY) (HCC): Primary | ICD-10-CM

## 2022-03-29 DIAGNOSIS — N17.0 ATN (ACUTE TUBULAR NECROSIS) (HCC): ICD-10-CM

## 2022-03-29 DIAGNOSIS — N17.9 ACUTE KIDNEY INJURY (HCC): Primary | ICD-10-CM

## 2022-03-29 PROBLEM — D64.9 ANEMIA: Status: ACTIVE | Noted: 2022-03-29

## 2022-03-29 PROBLEM — E87.6 HYPOKALEMIA: Status: ACTIVE | Noted: 2022-03-29

## 2022-03-29 PROBLEM — R73.9 HYPERGLYCEMIA: Status: ACTIVE | Noted: 2022-03-29

## 2022-03-29 LAB
ANION GAP SERPL CALC-SCNC: 11 MMOL/L (ref 0–18)
BASOPHILS # BLD AUTO: 0.11 X10(3) UL (ref 0–0.2)
BASOPHILS NFR BLD AUTO: 1 %
BILIRUB UR QL STRIP.AUTO: NEGATIVE
BUN BLD-MCNC: 34 MG/DL (ref 7–18)
CALCIUM BLD-MCNC: 10.6 MG/DL (ref 8.5–10.1)
CHLORIDE SERPL-SCNC: 98 MMOL/L (ref 98–112)
CO2 SERPL-SCNC: 27 MMOL/L (ref 21–32)
COLOR UR AUTO: YELLOW
CREAT BLD-MCNC: 3.49 MG/DL
CREAT UR-SCNC: 84.3 MG/DL
CREAT UR-SCNC: 85.4 MG/DL
EOSINOPHIL # BLD AUTO: 0.56 X10(3) UL (ref 0–0.7)
EOSINOPHIL NFR BLD AUTO: 5 %
ERYTHROCYTE [DISTWIDTH] IN BLOOD BY AUTOMATED COUNT: 13.9 %
FASTING STATUS PATIENT QL REPORTED: YES
GLUCOSE BLD-MCNC: 147 MG/DL (ref 70–99)
GLUCOSE BLD-MCNC: 71 MG/DL (ref 70–99)
GLUCOSE UR STRIP.AUTO-MCNC: 50 MG/DL
HCT VFR BLD AUTO: 36.8 %
HGB BLD-MCNC: 11.9 G/DL
IMM GRANULOCYTES # BLD AUTO: 0.06 X10(3) UL (ref 0–1)
IMM GRANULOCYTES NFR BLD: 0.5 %
IRON SATN MFR SERPL: 25 %
IRON SERPL-MCNC: 100 UG/DL
KETONES UR STRIP.AUTO-MCNC: NEGATIVE MG/DL
LYMPHOCYTES # BLD AUTO: 2.93 X10(3) UL (ref 1–4)
LYMPHOCYTES NFR BLD AUTO: 25.9 %
MAGNESIUM SERPL-MCNC: 2.5 MG/DL (ref 1.6–2.6)
MCH RBC QN AUTO: 29.1 PG (ref 26–34)
MCHC RBC AUTO-ENTMCNC: 32.3 G/DL (ref 31–37)
MCV RBC AUTO: 90 FL
MONOCYTES # BLD AUTO: 1.15 X10(3) UL (ref 0.1–1)
MONOCYTES NFR BLD AUTO: 10.2 %
NEUTROPHILS # BLD AUTO: 6.5 X10 (3) UL (ref 1.5–7.7)
NEUTROPHILS # BLD AUTO: 6.5 X10(3) UL (ref 1.5–7.7)
NEUTROPHILS NFR BLD AUTO: 57.4 %
NITRITE UR QL STRIP.AUTO: NEGATIVE
OSMOLALITY SERPL CALC.SUM OF ELEC: 292 MOSM/KG (ref 275–295)
PH UR STRIP.AUTO: 6 [PH] (ref 5–8)
PHOSPHATE SERPL-MCNC: 4.5 MG/DL (ref 2.5–4.9)
PLATELET # BLD AUTO: 420 10(3)UL (ref 150–450)
POTASSIUM SERPL-SCNC: 3.3 MMOL/L (ref 3.5–5.1)
PROT UR STRIP.AUTO-MCNC: 100 MG/DL
PROT UR-MCNC: 157.7 MG/DL
PROT/CREAT UR-RTO: 1.85
RBC # BLD AUTO: 4.09 X10(6)UL
RBC UR QL AUTO: NEGATIVE
SARS-COV-2 RNA RESP QL NAA+PROBE: NOT DETECTED
SODIUM SERPL-SCNC: 136 MMOL/L (ref 136–145)
SODIUM SERPL-SCNC: 30 MMOL/L
SP GR UR STRIP.AUTO: 1.01 (ref 1–1.03)
TIBC SERPL-MCNC: 395 UG/DL (ref 240–450)
TRANSFERRIN SERPL-MCNC: 265 MG/DL (ref 200–360)
UROBILINOGEN UR STRIP.AUTO-MCNC: <2 MG/DL
WBC # BLD AUTO: 11.3 X10(3) UL (ref 4–11)

## 2022-03-29 PROCEDURE — 3078F DIAST BP <80 MM HG: CPT | Performed by: INTERNAL MEDICINE

## 2022-03-29 PROCEDURE — 36415 COLL VENOUS BLD VENIPUNCTURE: CPT

## 2022-03-29 PROCEDURE — 84156 ASSAY OF PROTEIN URINE: CPT | Performed by: INTERNAL MEDICINE

## 2022-03-29 PROCEDURE — 96361 HYDRATE IV INFUSION ADD-ON: CPT

## 2022-03-29 PROCEDURE — 84100 ASSAY OF PHOSPHORUS: CPT

## 2022-03-29 PROCEDURE — 83735 ASSAY OF MAGNESIUM: CPT

## 2022-03-29 PROCEDURE — 99205 OFFICE O/P NEW HI 60 MIN: CPT | Performed by: INTERNAL MEDICINE

## 2022-03-29 PROCEDURE — 3074F SYST BP LT 130 MM HG: CPT | Performed by: INTERNAL MEDICINE

## 2022-03-29 PROCEDURE — 83540 ASSAY OF IRON: CPT | Performed by: INTERNAL MEDICINE

## 2022-03-29 PROCEDURE — 80048 BASIC METABOLIC PNL TOTAL CA: CPT

## 2022-03-29 PROCEDURE — 96360 HYDRATION IV INFUSION INIT: CPT

## 2022-03-29 PROCEDURE — 82962 GLUCOSE BLOOD TEST: CPT

## 2022-03-29 PROCEDURE — 85025 COMPLETE CBC W/AUTO DIFF WBC: CPT

## 2022-03-29 PROCEDURE — 82570 ASSAY OF URINE CREATININE: CPT | Performed by: INTERNAL MEDICINE

## 2022-03-29 PROCEDURE — 83550 IRON BINDING TEST: CPT | Performed by: INTERNAL MEDICINE

## 2022-03-29 PROCEDURE — 82570 ASSAY OF URINE CREATININE: CPT

## 2022-03-29 PROCEDURE — 99285 EMERGENCY DEPT VISIT HI MDM: CPT

## 2022-03-29 PROCEDURE — 96372 THER/PROPH/DIAG INJ SC/IM: CPT

## 2022-03-29 PROCEDURE — 81001 URINALYSIS AUTO W/SCOPE: CPT

## 2022-03-29 PROCEDURE — 84300 ASSAY OF URINE SODIUM: CPT

## 2022-03-29 RX ORDER — NICOTINE POLACRILEX 4 MG
30 LOZENGE BUCCAL
Status: DISCONTINUED | OUTPATIENT
Start: 2022-03-29 | End: 2022-04-01

## 2022-03-29 RX ORDER — DEXTROSE MONOHYDRATE 25 G/50ML
50 INJECTION, SOLUTION INTRAVENOUS
Status: DISCONTINUED | OUTPATIENT
Start: 2022-03-29 | End: 2022-04-01

## 2022-03-29 RX ORDER — SODIUM CHLORIDE 9 MG/ML
INJECTION, SOLUTION INTRAVENOUS CONTINUOUS
Status: ACTIVE | OUTPATIENT
Start: 2022-03-29 | End: 2022-03-29

## 2022-03-29 RX ORDER — ONDANSETRON 2 MG/ML
4 INJECTION INTRAMUSCULAR; INTRAVENOUS EVERY 4 HOURS PRN
Status: ACTIVE | OUTPATIENT
Start: 2022-03-29 | End: 2022-03-29

## 2022-03-29 RX ORDER — DESVENLAFAXINE 50 MG/1
50 TABLET, EXTENDED RELEASE ORAL
Status: DISCONTINUED | OUTPATIENT
Start: 2022-03-30 | End: 2022-04-01

## 2022-03-29 RX ORDER — LAMOTRIGINE 100 MG/1
200 TABLET ORAL 2 TIMES DAILY
Status: DISCONTINUED | OUTPATIENT
Start: 2022-03-30 | End: 2022-04-01

## 2022-03-29 RX ORDER — MELATONIN
400 DAILY
Status: DISCONTINUED | OUTPATIENT
Start: 2022-03-30 | End: 2022-04-01

## 2022-03-29 RX ORDER — SODIUM CHLORIDE, SODIUM LACTATE, POTASSIUM CHLORIDE, CALCIUM CHLORIDE 600; 310; 30; 20 MG/100ML; MG/100ML; MG/100ML; MG/100ML
INJECTION, SOLUTION INTRAVENOUS CONTINUOUS
Status: ACTIVE | OUTPATIENT
Start: 2022-03-29 | End: 2022-03-30

## 2022-03-29 RX ORDER — PROCHLORPERAZINE EDISYLATE 5 MG/ML
5 INJECTION INTRAMUSCULAR; INTRAVENOUS EVERY 8 HOURS PRN
Status: DISCONTINUED | OUTPATIENT
Start: 2022-03-29 | End: 2022-04-01

## 2022-03-29 RX ORDER — ONDANSETRON 2 MG/ML
4 INJECTION INTRAMUSCULAR; INTRAVENOUS EVERY 6 HOURS PRN
Status: DISCONTINUED | OUTPATIENT
Start: 2022-03-29 | End: 2022-04-01

## 2022-03-29 RX ORDER — SUMATRIPTAN 20 MG/1
1 SPRAY NASAL EVERY 2 HOUR PRN
Refills: 2 | Status: DISCONTINUED | OUTPATIENT
Start: 2022-03-29 | End: 2022-03-31

## 2022-03-29 RX ORDER — BISACODYL 10 MG
10 SUPPOSITORY, RECTAL RECTAL
Status: DISCONTINUED | OUTPATIENT
Start: 2022-03-29 | End: 2022-04-01

## 2022-03-29 RX ORDER — ROSUVASTATIN CALCIUM 20 MG/1
20 TABLET, COATED ORAL NIGHTLY
Status: DISCONTINUED | OUTPATIENT
Start: 2022-03-30 | End: 2022-03-30

## 2022-03-29 RX ORDER — POLYETHYLENE GLYCOL 3350 17 G/17G
17 POWDER, FOR SOLUTION ORAL DAILY PRN
Status: DISCONTINUED | OUTPATIENT
Start: 2022-03-29 | End: 2022-04-01

## 2022-03-29 RX ORDER — ALPRAZOLAM 0.5 MG/1
TABLET ORAL 2 TIMES DAILY PRN
Status: DISCONTINUED | OUTPATIENT
Start: 2022-03-29 | End: 2022-04-01

## 2022-03-29 RX ORDER — MELATONIN
3 NIGHTLY PRN
Status: DISCONTINUED | OUTPATIENT
Start: 2022-03-29 | End: 2022-04-01

## 2022-03-29 RX ORDER — NICOTINE POLACRILEX 4 MG
15 LOZENGE BUCCAL
Status: DISCONTINUED | OUTPATIENT
Start: 2022-03-29 | End: 2022-04-01

## 2022-03-29 RX ORDER — HYDROMORPHONE HYDROCHLORIDE 1 MG/ML
0.5 INJECTION, SOLUTION INTRAMUSCULAR; INTRAVENOUS; SUBCUTANEOUS EVERY 30 MIN PRN
Status: ACTIVE | OUTPATIENT
Start: 2022-03-29 | End: 2022-03-29

## 2022-03-29 RX ORDER — ACETAMINOPHEN 325 MG/1
650 TABLET ORAL EVERY 6 HOURS PRN
Status: DISCONTINUED | OUTPATIENT
Start: 2022-03-29 | End: 2022-04-01

## 2022-03-29 RX ORDER — FLUTICASONE PROPIONATE 50 MCG
1 SPRAY, SUSPENSION (ML) NASAL 2 TIMES DAILY
Status: DISCONTINUED | OUTPATIENT
Start: 2022-03-30 | End: 2022-04-01

## 2022-03-29 RX ORDER — HEPARIN SODIUM 5000 [USP'U]/ML
5000 INJECTION, SOLUTION INTRAVENOUS; SUBCUTANEOUS EVERY 8 HOURS SCHEDULED
Status: DISCONTINUED | OUTPATIENT
Start: 2022-03-29 | End: 2022-04-01

## 2022-03-29 RX ORDER — SENNOSIDES 8.6 MG
17.2 TABLET ORAL NIGHTLY PRN
Status: DISCONTINUED | OUTPATIENT
Start: 2022-03-29 | End: 2022-04-01

## 2022-03-29 NOTE — ED INITIAL ASSESSMENT (HPI)
Pt was sent to the ED by her PCP for elevated BUN and creatinine. Pt c/o of N/V x 1 week. Pt denies abdominal pain or flank pain.

## 2022-03-30 LAB
ANION GAP SERPL CALC-SCNC: 6 MMOL/L (ref 0–18)
BASOPHILS # BLD AUTO: 0.07 X10(3) UL (ref 0–0.2)
BASOPHILS NFR BLD AUTO: 0.7 %
BUN BLD-MCNC: 33 MG/DL (ref 7–18)
CALCIUM BLD-MCNC: 9.3 MG/DL (ref 8.5–10.1)
CHLORIDE SERPL-SCNC: 106 MMOL/L (ref 98–112)
CO2 SERPL-SCNC: 26 MMOL/L (ref 21–32)
CREAT BLD-MCNC: 3.7 MG/DL
EOSINOPHIL # BLD AUTO: 0.5 X10(3) UL (ref 0–0.7)
EOSINOPHIL NFR BLD AUTO: 5.3 %
ERYTHROCYTE [DISTWIDTH] IN BLOOD BY AUTOMATED COUNT: 14 %
GLUCOSE BLD-MCNC: 103 MG/DL (ref 70–99)
GLUCOSE BLD-MCNC: 108 MG/DL (ref 70–99)
GLUCOSE BLD-MCNC: 140 MG/DL (ref 70–99)
GLUCOSE BLD-MCNC: 172 MG/DL (ref 70–99)
GLUCOSE BLD-MCNC: 89 MG/DL (ref 70–99)
HCT VFR BLD AUTO: 31 %
HGB BLD-MCNC: 9.6 G/DL
IMM GRANULOCYTES # BLD AUTO: 0.03 X10(3) UL (ref 0–1)
IMM GRANULOCYTES NFR BLD: 0.3 %
LYMPHOCYTES # BLD AUTO: 3.24 X10(3) UL (ref 1–4)
LYMPHOCYTES NFR BLD AUTO: 34.4 %
MCH RBC QN AUTO: 28.3 PG (ref 26–34)
MCHC RBC AUTO-ENTMCNC: 31 G/DL (ref 31–37)
MCV RBC AUTO: 91.4 FL
MONOCYTES # BLD AUTO: 0.98 X10(3) UL (ref 0.1–1)
MONOCYTES NFR BLD AUTO: 10.4 %
NEUTROPHILS # BLD AUTO: 4.6 X10 (3) UL (ref 1.5–7.7)
NEUTROPHILS # BLD AUTO: 4.6 X10(3) UL (ref 1.5–7.7)
NEUTROPHILS NFR BLD AUTO: 48.9 %
OSMOLALITY SERPL CALC.SUM OF ELEC: 294 MOSM/KG (ref 275–295)
PLATELET # BLD AUTO: 283 10(3)UL (ref 150–450)
POTASSIUM SERPL-SCNC: 3.3 MMOL/L (ref 3.5–5.1)
POTASSIUM SERPL-SCNC: 3.3 MMOL/L (ref 3.5–5.1)
RBC # BLD AUTO: 3.39 X10(6)UL
SODIUM SERPL-SCNC: 138 MMOL/L (ref 136–145)
WBC # BLD AUTO: 9.4 X10(3) UL (ref 4–11)

## 2022-03-30 PROCEDURE — 80048 BASIC METABOLIC PNL TOTAL CA: CPT | Performed by: INTERNAL MEDICINE

## 2022-03-30 PROCEDURE — 85025 COMPLETE CBC W/AUTO DIFF WBC: CPT | Performed by: INTERNAL MEDICINE

## 2022-03-30 PROCEDURE — 84132 ASSAY OF SERUM POTASSIUM: CPT | Performed by: INTERNAL MEDICINE

## 2022-03-30 PROCEDURE — 82962 GLUCOSE BLOOD TEST: CPT

## 2022-03-30 RX ORDER — POTASSIUM CHLORIDE 20 MEQ/1
20 TABLET, EXTENDED RELEASE ORAL ONCE
Status: COMPLETED | OUTPATIENT
Start: 2022-03-30 | End: 2022-03-30

## 2022-03-30 RX ORDER — SODIUM CHLORIDE, SODIUM LACTATE, POTASSIUM CHLORIDE, CALCIUM CHLORIDE 600; 310; 30; 20 MG/100ML; MG/100ML; MG/100ML; MG/100ML
INJECTION, SOLUTION INTRAVENOUS CONTINUOUS
Status: DISCONTINUED | OUTPATIENT
Start: 2022-03-30 | End: 2022-04-01

## 2022-03-30 RX ORDER — SODIUM CHLORIDE, SODIUM LACTATE, POTASSIUM CHLORIDE, CALCIUM CHLORIDE 600; 310; 30; 20 MG/100ML; MG/100ML; MG/100ML; MG/100ML
INJECTION, SOLUTION INTRAVENOUS CONTINUOUS
Status: DISCONTINUED | OUTPATIENT
Start: 2022-03-30 | End: 2022-03-30

## 2022-03-30 NOTE — PROGRESS NOTES
22 1134   Over the last 2 weeks, how often have you been bothered by any of the following problems? Little interest or pleasure in doing things 0   Feeling down, depressed, or hopeless 1   Trouble falling or staying asleep, or sleeping too much 1   Feeling tired or having little energy 1   Poor appetite or overeating 1   Feeling bad about yourself - or that you are a failure or have let yourself or your family down 0   Trouble concentrating on things, such as reading the newspaper or watching television 1   Moving or speaking so slowly that other people could have noticed. Or the opposite - being so fidgety or restless that you have been moving around a lot more than usual 0   Thoughts that you would be better off dead, or of hurting yourself in some way 0   PHQ-9 TOTAL SCORE 5   If you checked off any problems, how difficult have these problems made it for you to do your work, take care of things at home, or get along with other people? Somewhat difficult   [address] [facility] [employer] Resource Referral Counselor Note    May Deven Patient Status:  Observation    1965 MRN [id number]   [facility] 3NE-A Attending Kellen Asencio MD   Hosp Day # 0 PCP HEATHER RIVERA, DO       S(subjective) The patient admits to having a history of anxiety and depression. She said, her anxiety increased since covid and even more since last Fall. She is on medication per Psych APN and has a psychotherapist for counseling. Huber Sellers denies any suicidal thoughts. O(objective) The patient is alert and oriented x4. Her mood and affect is wnl. A(assessment) The phq9 was completed. P(plan) The patient will continue with her mental health providers.       Anita Tamayo RN  3/30/2022  11:35 AM

## 2022-03-30 NOTE — ED QUICK NOTES
Orders for admission, patient is aware of plan and ready to go upstairs. Any questions, please call ED RN Karen Martinez at extension #[phone].      Patient Covid vaccination status: Fully vaccinated     COVID Test Ordered in ED: Rapid SARS-CoV-2 by PCR    COVID Suspicion at Admission: Low clinical suspicion for COVID    Running Infusions:      Mental Status/LOC at time of transport: A&Ox4    Other pertinent information:   CIWA score: N/A   NIH score:  N/A

## 2022-03-30 NOTE — PLAN OF CARE
61year old female admitted in room 3606 at CTU 3 for acute kidney injury. History of recent vomiting for 1 week. Patient reported the last time she vomitted was 3/28/2022 at night time. History of migraines. Creatinine 3.49, BUN 34 at 0954 on 3/29/2022.  To consult nephrology in AM.  Problem: Patient/Family Goals  Goal: Patient/Family Long Term Goal  Description: Patient's Long Term Goal: be discharged    Interventions:  - follow plan of care  - See additional Care Plan goals for specific interventions  Outcome: Progressing  Goal: Patient/Family Short Term Goal  Description: Patient's Short Term Goal:  manage anxiety and migraines    Interventions:   - follow plan of care  - See additional Care Plan goals for specific interventions  Outcome: Progressing

## 2022-03-30 NOTE — PLAN OF CARE
Patient alert and oriented x4. On RA.  NSR on tele. HR 80s. Denies pain. Denies n/v.  nephro consulted. Resting comfortably in bed. WCTM.

## 2022-03-31 ENCOUNTER — APPOINTMENT (OUTPATIENT)
Dept: PHYSICAL THERAPY | Age: 57
End: 2022-03-31
Attending: FAMILY MEDICINE
Payer: COMMERCIAL

## 2022-03-31 LAB
ANION GAP SERPL CALC-SCNC: 4 MMOL/L (ref 0–18)
BUN BLD-MCNC: 24 MG/DL (ref 7–18)
CALCIUM BLD-MCNC: 9.7 MG/DL (ref 8.5–10.1)
CHLORIDE SERPL-SCNC: 109 MMOL/L (ref 98–112)
CO2 SERPL-SCNC: 26 MMOL/L (ref 21–32)
CREAT BLD-MCNC: 2.89 MG/DL
GLUCOSE BLD-MCNC: 108 MG/DL (ref 70–99)
GLUCOSE BLD-MCNC: 121 MG/DL (ref 70–99)
GLUCOSE BLD-MCNC: 122 MG/DL (ref 70–99)
GLUCOSE BLD-MCNC: 179 MG/DL (ref 70–99)
GLUCOSE BLD-MCNC: 98 MG/DL (ref 70–99)
MAGNESIUM SERPL-MCNC: 2.1 MG/DL (ref 1.6–2.6)
OSMOLALITY SERPL CALC.SUM OF ELEC: 293 MOSM/KG (ref 275–295)
POTASSIUM SERPL-SCNC: 3.5 MMOL/L (ref 3.5–5.1)
SODIUM SERPL-SCNC: 139 MMOL/L (ref 136–145)

## 2022-03-31 PROCEDURE — 83735 ASSAY OF MAGNESIUM: CPT | Performed by: INTERNAL MEDICINE

## 2022-03-31 PROCEDURE — 80048 BASIC METABOLIC PNL TOTAL CA: CPT | Performed by: INTERNAL MEDICINE

## 2022-03-31 PROCEDURE — 82962 GLUCOSE BLOOD TEST: CPT

## 2022-03-31 RX ORDER — DEXAMETHASONE SODIUM PHOSPHATE 4 MG/ML
4 VIAL (ML) INJECTION EVERY 8 HOURS
Status: COMPLETED | OUTPATIENT
Start: 2022-03-31 | End: 2022-04-01

## 2022-03-31 RX ORDER — HYDROMORPHONE HYDROCHLORIDE 1 MG/ML
1 INJECTION, SOLUTION INTRAMUSCULAR; INTRAVENOUS; SUBCUTANEOUS
Status: DISCONTINUED | OUTPATIENT
Start: 2022-03-31 | End: 2022-04-01

## 2022-03-31 RX ORDER — TIZANIDINE 2 MG/1
2 TABLET ORAL 2 TIMES DAILY
Status: DISCONTINUED | OUTPATIENT
Start: 2022-03-31 | End: 2022-04-01

## 2022-03-31 RX ORDER — POTASSIUM CHLORIDE 20 MEQ/1
40 TABLET, EXTENDED RELEASE ORAL ONCE
Status: COMPLETED | OUTPATIENT
Start: 2022-03-31 | End: 2022-03-31

## 2022-03-31 NOTE — PLAN OF CARE
Assumed pt care at 0730. A&Ox4. VSS. Room air. NSR on tele. L AC PIV infusing LR @ 125 mL/hr. Carb controlled/renal diet, QID accuchecks. Reports HA improved with PRN imitrex. Reports nausea with 1 episode of clear emesis, improved with PRN zofran. Voiding, up SBA to bathroom. Heparin subcutaneous for VTE prevention. Pt updated with POC.      Problem: Patient/Family Goals  Goal: Patient/Family Long Term Goal  Description: Patient's Long Term Goal: Go home  Interventions:  - IV fluids  - Electrolyte replacement per renal  - See additional Care Plan goals for specific interventions  Outcome: Progressing  Goal: Patient/Family Short Term Goal  Description: Patient's Short Term Goal: Feel better  Interventions:   - IV fluids  - Electrolyte replacement per renal  - See additional Care Plan goals for specific interventions  Outcome: Progressing     Problem: Diabetes/Glucose Control  Goal: Glucose maintained within prescribed range  Description: INTERVENTIONS:  - Monitor Blood Glucose as ordered  - Assess for signs and symptoms of hyperglycemia and hypoglycemia  - Administer ordered medications to maintain glucose within target range  - Assess barriers to adequate nutritional intake and initiate nutrition consult as needed  - Instruct patient on self management of diabetes  Outcome: Progressing

## 2022-03-31 NOTE — PLAN OF CARE
Pt. A&O x4, on RA, NSR on tele. VSS. Up ad pawan. C/o mild headache pain, Tylenol given. Heparin for prophylaxis, refused night time dose, educated patient on DVT prevention, did not want SCDs or heparin. QID accucheck. LR infusing at 125/hr. No c/o nausea. Did c/o constipation/fullness despite having a BM, offered Miralax, pt. Wanted to wait til morning. Fall and safety precautions in place. Will continue to monitor.      Problem: Patient/Family Goals  Goal: Patient/Family Long Term Goal  Description: Patient's Long Term Goal: Go home    Interventions:  - IV fluids  - Electrolyte replacement per renal  - See additional Care Plan goals for specific interventions  Outcome: Progressing  Goal: Patient/Family Short Term Goal  Description: Patient's Short Term Goal: Feel better    Interventions:   - IV fluids  - Electrolyte replacement per renal  - See additional Care Plan goals for specific interventions  Outcome: Progressing     Problem: Diabetes/Glucose Control  Goal: Glucose maintained within prescribed range  Description: INTERVENTIONS:  - Monitor Blood Glucose as ordered  - Assess for signs and symptoms of hyperglycemia and hypoglycemia  - Administer ordered medications to maintain glucose within target range  - Assess barriers to adequate nutritional intake and initiate nutrition consult as needed  - Instruct patient on self management of diabetes  Outcome: Progressing

## 2022-04-01 VITALS
OXYGEN SATURATION: 98 % | TEMPERATURE: 99 F | DIASTOLIC BLOOD PRESSURE: 73 MMHG | HEIGHT: 65.35 IN | RESPIRATION RATE: 17 BRPM | HEART RATE: 89 BPM | BODY MASS INDEX: 28.91 KG/M2 | WEIGHT: 175.63 LBS | SYSTOLIC BLOOD PRESSURE: 134 MMHG

## 2022-04-01 LAB
ANION GAP SERPL CALC-SCNC: 4 MMOL/L (ref 0–18)
BUN BLD-MCNC: 21 MG/DL (ref 7–18)
CALCIUM BLD-MCNC: 9.4 MG/DL (ref 8.5–10.1)
CHLORIDE SERPL-SCNC: 112 MMOL/L (ref 98–112)
CO2 SERPL-SCNC: 23 MMOL/L (ref 21–32)
CREAT BLD-MCNC: 2.29 MG/DL
GLUCOSE BLD-MCNC: 175 MG/DL (ref 70–99)
GLUCOSE BLD-MCNC: 178 MG/DL (ref 70–99)
GLUCOSE BLD-MCNC: 226 MG/DL (ref 70–99)
MAGNESIUM SERPL-MCNC: 1.8 MG/DL (ref 1.6–2.6)
OSMOLALITY SERPL CALC.SUM OF ELEC: 295 MOSM/KG (ref 275–295)
POTASSIUM SERPL-SCNC: 4.2 MMOL/L (ref 3.5–5.1)
SODIUM SERPL-SCNC: 139 MMOL/L (ref 136–145)

## 2022-04-01 PROCEDURE — 80048 BASIC METABOLIC PNL TOTAL CA: CPT | Performed by: INTERNAL MEDICINE

## 2022-04-01 PROCEDURE — 83735 ASSAY OF MAGNESIUM: CPT | Performed by: INTERNAL MEDICINE

## 2022-04-01 PROCEDURE — 82962 GLUCOSE BLOOD TEST: CPT

## 2022-04-01 RX ORDER — DIVALPROEX SODIUM 500 MG/1
500 TABLET, DELAYED RELEASE ORAL 2 TIMES DAILY
Qty: 30 TABLET | Refills: 1 | Status: SHIPPED | OUTPATIENT
Start: 2022-04-01 | End: 2022-04-07 | Stop reason: ALTCHOICE

## 2022-04-01 RX ORDER — MAGNESIUM OXIDE 400 MG (241.3 MG MAGNESIUM) TABLET
400 TABLET ONCE
Status: COMPLETED | OUTPATIENT
Start: 2022-04-01 | End: 2022-04-01

## 2022-04-01 NOTE — PROGRESS NOTES
[facility] [address]  [place], [address]  ?  04/01/22  ? Re: Qamar John  ? To Whom It May Concern:    Qamar John was admitted to [facility] from 3/29/2022 to 04/01/22. Please excuse Qamar John from attending work for these days. The patient may return to work on 4/7/2022 with the following restrictions: none. Patient will follow up with their primary care physician upon discharge. Further restrictions and work excuse will be based on primary care physician's evaluation. ?   Thank you,    Josee Solorzano MD, MD  [facility]

## 2022-04-01 NOTE — PROGRESS NOTES
D: patient received into care at [address] [place] [address]. Vss. Sating well on room air. NSR on tele. LR @ 125 infusing to left AC. QID accuchecks, refused HS accuchek and insulin. Stated \"I just had a sprite so I dont want it to be wrong\". Writer educated patient on the importance of checking her glucose as the insulin would be used t correct it if it was high. Patient stated \"I said no\". Pt refused heparin inj. Educated on importance of same. Pt cooperative with care otherwise. Requested tylenol for ongoing headache.

## 2022-04-01 NOTE — PLAN OF CARE
After speaking with [facility] neurology APN, plan was to discharge patient home with no new meds. Patient tearful about this plan. Notified Darya, prescription ordered. Patient now ok with discharge POC. NURSING DISCHARGE NOTE    Discharged Home via Wheelchair. Accompanied by Support staff  Belongings Taken by patient/family.

## 2022-04-04 ENCOUNTER — PATIENT OUTREACH (OUTPATIENT)
Dept: CASE MANAGEMENT | Age: 57
End: 2022-04-04

## 2022-04-04 ENCOUNTER — APPOINTMENT (OUTPATIENT)
Dept: PHYSICAL THERAPY | Age: 57
End: 2022-04-04
Attending: FAMILY MEDICINE
Payer: COMMERCIAL

## 2022-04-04 ENCOUNTER — LAB ENCOUNTER (OUTPATIENT)
Dept: LAB | Age: 57
End: 2022-04-04
Attending: INTERNAL MEDICINE
Payer: COMMERCIAL

## 2022-04-04 ENCOUNTER — TELEPHONE (OUTPATIENT)
Dept: NEUROLOGY | Facility: CLINIC | Age: 57
End: 2022-04-04

## 2022-04-04 DIAGNOSIS — N17.0 ATN (ACUTE TUBULAR NECROSIS) (HCC): ICD-10-CM

## 2022-04-04 DIAGNOSIS — N17.9 AKI (ACUTE KIDNEY INJURY) (HCC): ICD-10-CM

## 2022-04-04 LAB
ANION GAP SERPL CALC-SCNC: 7 MMOL/L (ref 0–18)
BUN BLD-MCNC: 18 MG/DL (ref 7–18)
CALCIUM BLD-MCNC: 10.5 MG/DL (ref 8.5–10.1)
CHLORIDE SERPL-SCNC: 106 MMOL/L (ref 98–112)
CO2 SERPL-SCNC: 28 MMOL/L (ref 21–32)
CREAT BLD-MCNC: 1.9 MG/DL
FASTING STATUS PATIENT QL REPORTED: NO
GLUCOSE BLD-MCNC: 159 MG/DL (ref 70–99)
OSMOLALITY SERPL CALC.SUM OF ELEC: 297 MOSM/KG (ref 275–295)
POTASSIUM SERPL-SCNC: 3.3 MMOL/L (ref 3.5–5.1)
SODIUM SERPL-SCNC: 141 MMOL/L (ref 136–145)

## 2022-04-04 PROCEDURE — 80048 BASIC METABOLIC PNL TOTAL CA: CPT

## 2022-04-04 RX ORDER — RIZATRIPTAN BENZOATE 10 MG/1
TABLET ORAL
Qty: 12 TABLET | Refills: 1 | Status: SHIPPED | OUTPATIENT
Start: 2022-04-04

## 2022-04-04 RX ORDER — TIZANIDINE 4 MG/1
TABLET ORAL
Qty: 30 TABLET | Refills: 2 | Status: SHIPPED | OUTPATIENT
Start: 2022-04-04

## 2022-04-04 RX ORDER — FREMANEZUMAB-VFRM 225 MG/1.5ML
225 INJECTION SUBCUTANEOUS
Qty: 1.5 ML | Refills: 5 | Status: SHIPPED | OUTPATIENT
Start: 2022-04-04 | End: 2023-04-04

## 2022-04-04 NOTE — PROGRESS NOTES
LM for pt to call [facility] for TCM since discharge. NCM phone number was provided for pt to call back. TCC contact information was provided for pt to call back as well.

## 2022-04-04 NOTE — TELEPHONE ENCOUNTER
Spoke to patient and she will discontinue the depakote and verapamil. We will start her on Ajovy and tizanidine for headache prevention. She can continue to use the zomig nasal spray for abortive tx but is basically almost out. Will have her try the maxalt for abortive tx.

## 2022-04-05 ENCOUNTER — TELEPHONE (OUTPATIENT)
Dept: NEUROLOGY | Facility: CLINIC | Age: 57
End: 2022-04-05

## 2022-04-05 ENCOUNTER — TELEPHONE (OUTPATIENT)
Dept: NEPHROLOGY | Facility: CLINIC | Age: 57
End: 2022-04-05

## 2022-04-05 RX ORDER — POTASSIUM CHLORIDE 1500 MG/1
20 TABLET, FILM COATED, EXTENDED RELEASE ORAL 2 TIMES DAILY
Qty: 60 TABLET | Refills: 0 | Status: SHIPPED | OUTPATIENT
Start: 2022-04-05

## 2022-04-05 NOTE — TELEPHONE ENCOUNTER
PA requested for Ajovy  Clinical questions answered and submitted to insurance  Awaiting coverage determination

## 2022-04-05 NOTE — TELEPHONE ENCOUNTER
Pt called; labs improving  Needs K supplement- called in kdur 20 bid - 1 mo supply    Repeat labs in 1 wk    PT called; left message and requested call back.      KATY

## 2022-04-05 NOTE — PROGRESS NOTES
LM for pt to call [facility] for TCM since discharge. NCM phone number was provided for pt to call back. TCC contact information was provided for pt to call back as well.

## 2022-04-06 NOTE — TELEPHONE ENCOUNTER
Received AUTHORIZATION via fax from [address] [place] [address],[address] for;    AJOVY 225 mg/1.5mL auto-injectors  Case-[id number]  4/6/2022-4/6/2023    Approval document placed in binder. Patient informed via phone.

## 2022-04-06 NOTE — TELEPHONE ENCOUNTER
[address] is calling to request additional information to process PA. Please advise.

## 2022-04-07 ENCOUNTER — OFFICE VISIT (OUTPATIENT)
Dept: FAMILY MEDICINE CLINIC | Facility: CLINIC | Age: 57
End: 2022-04-07
Payer: COMMERCIAL

## 2022-04-07 ENCOUNTER — TELEPHONE (OUTPATIENT)
Dept: NEPHROLOGY | Facility: CLINIC | Age: 57
End: 2022-04-07

## 2022-04-07 VITALS
TEMPERATURE: 98 F | BODY MASS INDEX: 29.37 KG/M2 | HEIGHT: 64 IN | WEIGHT: 172 LBS | DIASTOLIC BLOOD PRESSURE: 82 MMHG | HEART RATE: 80 BPM | RESPIRATION RATE: 16 BRPM | SYSTOLIC BLOOD PRESSURE: 120 MMHG

## 2022-04-07 DIAGNOSIS — N17.9 AKI (ACUTE KIDNEY INJURY) (HCC): Primary | ICD-10-CM

## 2022-04-07 DIAGNOSIS — E11.65 UNCONTROLLED TYPE 2 DIABETES MELLITUS WITH HYPERGLYCEMIA (HCC): ICD-10-CM

## 2022-04-07 DIAGNOSIS — I10 ESSENTIAL HYPERTENSION WITH GOAL BLOOD PRESSURE LESS THAN 130/80: ICD-10-CM

## 2022-04-07 PROCEDURE — 99495 TRANSJ CARE MGMT MOD F2F 14D: CPT | Performed by: FAMILY MEDICINE

## 2022-04-07 PROCEDURE — 3008F BODY MASS INDEX DOCD: CPT | Performed by: FAMILY MEDICINE

## 2022-04-07 PROCEDURE — 3074F SYST BP LT 130 MM HG: CPT | Performed by: FAMILY MEDICINE

## 2022-04-07 PROCEDURE — 3079F DIAST BP 80-89 MM HG: CPT | Performed by: FAMILY MEDICINE

## 2022-04-07 RX ORDER — NIFEDIPINE 30 MG/1
30 TABLET, FILM COATED, EXTENDED RELEASE ORAL DAILY
Qty: 90 TABLET | Refills: 1 | Status: SHIPPED | OUTPATIENT
Start: 2022-04-07

## 2022-04-08 ENCOUNTER — PATIENT MESSAGE (OUTPATIENT)
Dept: NEUROLOGY | Facility: CLINIC | Age: 57
End: 2022-04-08

## 2022-04-08 ENCOUNTER — APPOINTMENT (OUTPATIENT)
Dept: PHYSICAL THERAPY | Age: 57
End: 2022-04-08
Attending: FAMILY MEDICINE
Payer: COMMERCIAL

## 2022-04-08 NOTE — TELEPHONE ENCOUNTER
Care reviewed w/ pt.   Unfortunately, she is still having persistent n/v related to migraines  BP has spikes in 191H systolic at time - seems to correlate w/ headache severity and assocated nausea    Advised pt to f/u with neurology for ongoing migraine management  Stop lisinopril  Will start on nifedipine 30 daily- titrate to goal; goal sbp <140  Labs next week

## 2022-04-11 ENCOUNTER — LAB ENCOUNTER (OUTPATIENT)
Dept: LAB | Age: 57
End: 2022-04-11
Attending: INTERNAL MEDICINE
Payer: COMMERCIAL

## 2022-04-11 DIAGNOSIS — N17.9 AKI (ACUTE KIDNEY INJURY) (HCC): ICD-10-CM

## 2022-04-11 PROBLEM — E11.65 UNCONTROLLED TYPE 2 DIABETES MELLITUS WITH HYPERGLYCEMIA (HCC): Status: ACTIVE | Noted: 2022-04-11

## 2022-04-11 LAB
ANION GAP SERPL CALC-SCNC: 7 MMOL/L (ref 0–18)
BUN BLD-MCNC: 21 MG/DL (ref 7–18)
CALCIUM BLD-MCNC: 11 MG/DL (ref 8.5–10.1)
CHLORIDE SERPL-SCNC: 104 MMOL/L (ref 98–112)
CO2 SERPL-SCNC: 24 MMOL/L (ref 21–32)
CREAT BLD-MCNC: 2.32 MG/DL
FASTING STATUS PATIENT QL REPORTED: NO
GLUCOSE BLD-MCNC: 175 MG/DL (ref 70–99)
MAGNESIUM SERPL-MCNC: 2.7 MG/DL (ref 1.6–2.6)
OSMOLALITY SERPL CALC.SUM OF ELEC: 287 MOSM/KG (ref 275–295)
POTASSIUM SERPL-SCNC: 4.8 MMOL/L (ref 3.5–5.1)
SODIUM SERPL-SCNC: 135 MMOL/L (ref 136–145)

## 2022-04-11 PROCEDURE — 83735 ASSAY OF MAGNESIUM: CPT

## 2022-04-11 PROCEDURE — 80048 BASIC METABOLIC PNL TOTAL CA: CPT

## 2022-04-11 NOTE — TELEPHONE ENCOUNTER
From: Terrance Garrison  To: RAD Conley  Sent: 4/8/2022 2:38 PM CDT  Subject: Return to work    Just letting you know I continue to vomit every day, several times a day. Last night it was every 2 hrs. I was supposed to go to work on Thursday per the hospitalist paperwork. My boss gave it to me off so that gave me a start date of Monday. I went shopping that day just to see what would happen. I was home within the hour. I couldn't take the lights and all the sound and I didn't have the energy to make it around the store. I had to stop in the changing room to rest.     I don't really care what work or disability says. I will see if there is improvement on Monday otherwise I plan to call off until I feel it is healthy for me to return. I am going to get my follow up labs done again on Monday and we can see how that is going. My blood pressure is running high. Dr Linette Dang called me and we are making some med changes. I started my Ajovy yesterday and am crossing my fingers. First dose of maxalt today. I did get some relief.      Hope you have a nice weekend,  Solitario Lassiter

## 2022-04-13 ENCOUNTER — TELEPHONE (OUTPATIENT)
Dept: NEPHROLOGY | Facility: CLINIC | Age: 57
End: 2022-04-13

## 2022-04-13 NOTE — TELEPHONE ENCOUNTER
Spoke to patient and will need to give the ajovy as well as her recent bp medication change some time to work to see if headaches will improve. The other day she was just having nausea//vomiting without a headache as well as diarrhea. If she continues to have nausea/vomiting without headache may need to consider GI consult.  She expressed full understanding

## 2022-04-18 ENCOUNTER — OFFICE VISIT (OUTPATIENT)
Dept: NEUROLOGY | Facility: CLINIC | Age: 57
End: 2022-04-18
Payer: COMMERCIAL

## 2022-04-18 ENCOUNTER — TELEPHONE (OUTPATIENT)
Dept: NEUROLOGY | Facility: CLINIC | Age: 57
End: 2022-04-18

## 2022-04-18 VITALS
RESPIRATION RATE: 16 BRPM | WEIGHT: 172 LBS | HEART RATE: 74 BPM | HEIGHT: 64 IN | BODY MASS INDEX: 29.37 KG/M2 | SYSTOLIC BLOOD PRESSURE: 130 MMHG | DIASTOLIC BLOOD PRESSURE: 78 MMHG

## 2022-04-18 DIAGNOSIS — G43.009 MIGRAINE WITHOUT AURA AND WITHOUT STATUS MIGRAINOSUS, NOT INTRACTABLE: Primary | ICD-10-CM

## 2022-04-18 DIAGNOSIS — M48.02 NEURAL FORAMINAL STENOSIS OF CERVICAL SPINE: ICD-10-CM

## 2022-04-18 PROCEDURE — 3075F SYST BP GE 130 - 139MM HG: CPT | Performed by: PHYSICIAN ASSISTANT

## 2022-04-18 PROCEDURE — 3008F BODY MASS INDEX DOCD: CPT | Performed by: PHYSICIAN ASSISTANT

## 2022-04-18 PROCEDURE — 99213 OFFICE O/P EST LOW 20 MIN: CPT | Performed by: PHYSICIAN ASSISTANT

## 2022-04-18 PROCEDURE — 3078F DIAST BP <80 MM HG: CPT | Performed by: PHYSICIAN ASSISTANT

## 2022-04-18 RX ORDER — RIZATRIPTAN BENZOATE 10 MG/1
TABLET, ORALLY DISINTEGRATING ORAL
COMMUNITY

## 2022-04-19 ENCOUNTER — LAB ENCOUNTER (OUTPATIENT)
Dept: LAB | Age: 57
End: 2022-04-19
Attending: INTERNAL MEDICINE
Payer: COMMERCIAL

## 2022-04-19 DIAGNOSIS — N17.9 AKI (ACUTE KIDNEY INJURY) (HCC): ICD-10-CM

## 2022-04-19 LAB
ANION GAP SERPL CALC-SCNC: 8 MMOL/L (ref 0–18)
BUN BLD-MCNC: 24 MG/DL (ref 7–18)
CALCIUM BLD-MCNC: 10.8 MG/DL (ref 8.5–10.1)
CHLORIDE SERPL-SCNC: 106 MMOL/L (ref 98–112)
CO2 SERPL-SCNC: 23 MMOL/L (ref 21–32)
CREAT BLD-MCNC: 1.99 MG/DL
FASTING STATUS PATIENT QL REPORTED: NO
GLUCOSE BLD-MCNC: 223 MG/DL (ref 70–99)
MAGNESIUM SERPL-MCNC: 2.2 MG/DL (ref 1.6–2.6)
OSMOLALITY SERPL CALC.SUM OF ELEC: 295 MOSM/KG (ref 275–295)
PHOSPHATE SERPL-MCNC: 2.8 MG/DL (ref 2.5–4.9)
POTASSIUM SERPL-SCNC: 4.5 MMOL/L (ref 3.5–5.1)
SODIUM SERPL-SCNC: 137 MMOL/L (ref 136–145)

## 2022-04-19 PROCEDURE — 80048 BASIC METABOLIC PNL TOTAL CA: CPT

## 2022-04-19 PROCEDURE — 83735 ASSAY OF MAGNESIUM: CPT

## 2022-04-19 PROCEDURE — 84100 ASSAY OF PHOSPHORUS: CPT

## 2022-04-20 ENCOUNTER — TELEPHONE (OUTPATIENT)
Dept: NEPHROLOGY | Facility: CLINIC | Age: 57
End: 2022-04-20

## 2022-04-20 NOTE — TELEPHONE ENCOUNTER
Labs reviewed w/ pt  Continue nifedipine - bp is well controlled    Decrease k supplment to 20 meq daily (from bid)    F/u in office later this week  Plan for lab check in 1 wk again    F/u w/ neurology for migraine management

## 2022-04-22 ENCOUNTER — OFFICE VISIT (OUTPATIENT)
Dept: NEPHROLOGY | Facility: CLINIC | Age: 57
End: 2022-04-22
Payer: COMMERCIAL

## 2022-04-22 VITALS — SYSTOLIC BLOOD PRESSURE: 112 MMHG | WEIGHT: 171.25 LBS | DIASTOLIC BLOOD PRESSURE: 74 MMHG | BODY MASS INDEX: 29 KG/M2

## 2022-04-22 DIAGNOSIS — N17.9 AKI (ACUTE KIDNEY INJURY) (HCC): Primary | ICD-10-CM

## 2022-04-26 ENCOUNTER — LAB ENCOUNTER (OUTPATIENT)
Dept: LAB | Age: 57
End: 2022-04-26
Attending: INTERNAL MEDICINE
Payer: COMMERCIAL

## 2022-04-26 ENCOUNTER — TELEPHONE (OUTPATIENT)
Dept: NEPHROLOGY | Facility: CLINIC | Age: 57
End: 2022-04-26

## 2022-04-26 DIAGNOSIS — E83.52 HYPERCALCEMIA: ICD-10-CM

## 2022-04-26 DIAGNOSIS — N17.9 AKI (ACUTE KIDNEY INJURY) (HCC): ICD-10-CM

## 2022-04-26 LAB
ANION GAP SERPL CALC-SCNC: 8 MMOL/L (ref 0–18)
BILIRUB UR QL STRIP.AUTO: NEGATIVE
BUN BLD-MCNC: 22 MG/DL (ref 7–18)
CALCIUM BLD-MCNC: 11 MG/DL (ref 8.5–10.1)
CHLORIDE SERPL-SCNC: 108 MMOL/L (ref 98–112)
CO2 SERPL-SCNC: 24 MMOL/L (ref 21–32)
COLOR UR AUTO: YELLOW
CREAT BLD-MCNC: 2.08 MG/DL
CREAT UR-SCNC: 91.3 MG/DL
FASTING STATUS PATIENT QL REPORTED: YES
GLUCOSE BLD-MCNC: 170 MG/DL (ref 70–99)
GLUCOSE UR STRIP.AUTO-MCNC: >=500 MG/DL
GRAN CASTS #/AREA URNS LPF: PRESENT /LPF
MAGNESIUM SERPL-MCNC: 2.3 MG/DL (ref 1.6–2.6)
NITRITE UR QL STRIP.AUTO: NEGATIVE
OSMOLALITY SERPL CALC.SUM OF ELEC: 297 MOSM/KG (ref 275–295)
PH UR STRIP.AUTO: 6 [PH] (ref 5–8)
POTASSIUM SERPL-SCNC: 3.9 MMOL/L (ref 3.5–5.1)
PROT UR STRIP.AUTO-MCNC: 100 MG/DL
PROT UR-MCNC: 236.4 MG/DL
PTH-INTACT SERPL-MCNC: 20.2 PG/ML (ref 18.5–88)
RBC UR QL AUTO: NEGATIVE
SODIUM SERPL-SCNC: 140 MMOL/L (ref 136–145)
SP GR UR STRIP.AUTO: 1.01 (ref 1–1.03)
UROBILINOGEN UR STRIP.AUTO-MCNC: <2 MG/DL
VIT D+METAB SERPL-MCNC: 35.8 NG/ML (ref 30–100)

## 2022-04-26 PROCEDURE — 84165 PROTEIN E-PHORESIS SERUM: CPT

## 2022-04-26 PROCEDURE — 86334 IMMUNOFIX E-PHORESIS SERUM: CPT

## 2022-04-26 PROCEDURE — 82570 ASSAY OF URINE CREATININE: CPT

## 2022-04-26 PROCEDURE — 83735 ASSAY OF MAGNESIUM: CPT

## 2022-04-26 PROCEDURE — 84156 ASSAY OF PROTEIN URINE: CPT

## 2022-04-26 PROCEDURE — 83970 ASSAY OF PARATHORMONE: CPT

## 2022-04-26 PROCEDURE — 83521 IG LIGHT CHAINS FREE EACH: CPT

## 2022-04-26 PROCEDURE — 82306 VITAMIN D 25 HYDROXY: CPT

## 2022-04-26 PROCEDURE — 81001 URINALYSIS AUTO W/SCOPE: CPT

## 2022-04-26 PROCEDURE — 80048 BASIC METABOLIC PNL TOTAL CA: CPT

## 2022-04-26 NOTE — TELEPHONE ENCOUNTER
Labs reviewed  Await hypercalcemia w/u    Pt still dealing with lot of migraine related issues- n/v; severe headache  She has been following with neurology - plans to touch base with them tomorrow again      KP

## 2022-04-26 NOTE — TELEPHONE ENCOUNTER
Provider completed the [facility] paperwork. RN needs to clarify approximate date condition commenced. RN LM for patient to call back.

## 2022-04-27 NOTE — TELEPHONE ENCOUNTER
RN completed the [facility] paperwork and faxed to [employer] at [phone]. Fax confirmation received. A copy of the paperwork was sent to the patient. Copy sent to scan.

## 2022-04-29 LAB
ALBUMIN SERPL ELPH-MCNC: 4.31 G/DL (ref 3.75–5.21)
ALBUMIN/GLOB SERPL: 1.2 {RATIO} (ref 1–2)
ALPHA1 GLOB SERPL ELPH-MCNC: 0.43 G/DL (ref 0.19–0.46)
ALPHA2 GLOB SERPL ELPH-MCNC: 1.28 G/DL (ref 0.48–1.05)
B-GLOBULIN SERPL ELPH-MCNC: 1.07 G/DL (ref 0.68–1.23)
GAMMA GLOB SERPL ELPH-MCNC: 0.81 G/DL (ref 0.62–1.7)
KAPPA LC FREE SER-MCNC: 4.84 MG/DL (ref 0.33–1.94)
KAPPA LC FREE/LAMBDA FREE SER NEPH: 1.27 {RATIO} (ref 0.26–1.65)
LAMBDA LC FREE SERPL-MCNC: 3.8 MG/DL (ref 0.57–2.63)
PROT SERPL-MCNC: 7.9 G/DL (ref 6.4–8.2)

## 2022-05-03 ENCOUNTER — TELEPHONE (OUTPATIENT)
Dept: NEPHROLOGY | Facility: CLINIC | Age: 57
End: 2022-05-03

## 2022-05-05 ENCOUNTER — OFFICE VISIT (OUTPATIENT)
Dept: FAMILY MEDICINE CLINIC | Facility: CLINIC | Age: 57
End: 2022-05-05
Payer: COMMERCIAL

## 2022-05-05 ENCOUNTER — LAB ENCOUNTER (OUTPATIENT)
Dept: LAB | Age: 57
End: 2022-05-05
Attending: INTERNAL MEDICINE
Payer: COMMERCIAL

## 2022-05-05 VITALS
HEART RATE: 84 BPM | BODY MASS INDEX: 28.68 KG/M2 | WEIGHT: 168 LBS | DIASTOLIC BLOOD PRESSURE: 66 MMHG | TEMPERATURE: 97 F | SYSTOLIC BLOOD PRESSURE: 100 MMHG | HEIGHT: 64 IN | RESPIRATION RATE: 16 BRPM

## 2022-05-05 DIAGNOSIS — I10 ESSENTIAL HYPERTENSION WITH GOAL BLOOD PRESSURE LESS THAN 130/80: ICD-10-CM

## 2022-05-05 DIAGNOSIS — G43.711 CHRONIC MIGRAINE WITHOUT AURA, INTRACTABLE, WITH STATUS MIGRAINOSUS: ICD-10-CM

## 2022-05-05 DIAGNOSIS — E11.65 UNCONTROLLED TYPE 2 DIABETES MELLITUS WITH HYPERGLYCEMIA (HCC): ICD-10-CM

## 2022-05-05 DIAGNOSIS — E11.65 UNCONTROLLED TYPE 2 DIABETES MELLITUS WITH HYPERGLYCEMIA (HCC): Primary | ICD-10-CM

## 2022-05-05 DIAGNOSIS — N17.9 AKI (ACUTE KIDNEY INJURY) (HCC): ICD-10-CM

## 2022-05-05 LAB
ANION GAP SERPL CALC-SCNC: 10 MMOL/L (ref 0–18)
BUN BLD-MCNC: 21 MG/DL (ref 7–18)
CALCIUM BLD-MCNC: 10.2 MG/DL (ref 8.5–10.1)
CHLORIDE SERPL-SCNC: 108 MMOL/L (ref 98–112)
CO2 SERPL-SCNC: 22 MMOL/L (ref 21–32)
CREAT BLD-MCNC: 2.21 MG/DL
EST. AVERAGE GLUCOSE BLD GHB EST-MCNC: 148 MG/DL (ref 68–126)
FASTING STATUS PATIENT QL REPORTED: NO
GLUCOSE BLD-MCNC: 137 MG/DL (ref 70–99)
HBA1C MFR BLD: 6.8 % (ref ?–5.7)
MAGNESIUM SERPL-MCNC: 2.3 MG/DL (ref 1.6–2.6)
OSMOLALITY SERPL CALC.SUM OF ELEC: 295 MOSM/KG (ref 275–295)
PHOSPHATE SERPL-MCNC: 3.3 MG/DL (ref 2.5–4.9)
POTASSIUM SERPL-SCNC: 3.9 MMOL/L (ref 3.5–5.1)
SODIUM SERPL-SCNC: 140 MMOL/L (ref 136–145)

## 2022-05-05 PROCEDURE — 84100 ASSAY OF PHOSPHORUS: CPT

## 2022-05-05 PROCEDURE — 83735 ASSAY OF MAGNESIUM: CPT

## 2022-05-05 PROCEDURE — 3008F BODY MASS INDEX DOCD: CPT | Performed by: FAMILY MEDICINE

## 2022-05-05 PROCEDURE — 3078F DIAST BP <80 MM HG: CPT | Performed by: FAMILY MEDICINE

## 2022-05-05 PROCEDURE — 80048 BASIC METABOLIC PNL TOTAL CA: CPT

## 2022-05-05 PROCEDURE — 3044F HG A1C LEVEL LT 7.0%: CPT | Performed by: FAMILY MEDICINE

## 2022-05-05 PROCEDURE — 3074F SYST BP LT 130 MM HG: CPT | Performed by: FAMILY MEDICINE

## 2022-05-05 PROCEDURE — 83036 HEMOGLOBIN GLYCOSYLATED A1C: CPT

## 2022-05-05 PROCEDURE — 99214 OFFICE O/P EST MOD 30 MIN: CPT | Performed by: FAMILY MEDICINE

## 2022-05-06 ENCOUNTER — TELEPHONE (OUTPATIENT)
Dept: NEPHROLOGY | Facility: CLINIC | Age: 57
End: 2022-05-06

## 2022-05-06 ENCOUNTER — LAB ENCOUNTER (OUTPATIENT)
Dept: LAB | Age: 57
End: 2022-05-06
Attending: INTERNAL MEDICINE
Payer: COMMERCIAL

## 2022-05-06 DIAGNOSIS — N17.9 AKI (ACUTE KIDNEY INJURY) (HCC): ICD-10-CM

## 2022-05-06 DIAGNOSIS — R80.9 PROTEINURIA, UNSPECIFIED TYPE: ICD-10-CM

## 2022-05-06 LAB
ALBUMIN SERPL-MCNC: 3.8 G/DL (ref 3.4–5)
ALBUMIN/GLOB SERPL: 1.2 {RATIO} (ref 1–2)
ALP LIVER SERPL-CCNC: 55 U/L
ALT SERPL-CCNC: 20 U/L
ANION GAP SERPL CALC-SCNC: 9 MMOL/L (ref 0–18)
AST SERPL-CCNC: 20 U/L (ref 15–37)
BILIRUB SERPL-MCNC: 0.4 MG/DL (ref 0.1–2)
BUN BLD-MCNC: 16 MG/DL (ref 7–18)
C3 SERPL-MCNC: 110 MG/DL (ref 90–180)
C4 SERPL-MCNC: 22 MG/DL (ref 10–40)
CALCIUM BLD-MCNC: 9.7 MG/DL (ref 8.5–10.1)
CHLORIDE SERPL-SCNC: 105 MMOL/L (ref 98–112)
CO2 SERPL-SCNC: 23 MMOL/L (ref 21–32)
CREAT BLD-MCNC: 1.97 MG/DL
FASTING STATUS PATIENT QL REPORTED: YES
GLOBULIN PLAS-MCNC: 3.1 G/DL (ref 2.8–4.4)
GLUCOSE BLD-MCNC: 230 MG/DL (ref 70–99)
HBV SURFACE AG SER-ACNC: <0.1 [IU]/L
HBV SURFACE AG SERPL QL IA: NONREACTIVE
HCV AB SERPL QL IA: NONREACTIVE
INR BLD: 1.1 (ref 0.8–1.2)
OSMOLALITY SERPL CALC.SUM OF ELEC: 292 MOSM/KG (ref 275–295)
POTASSIUM SERPL-SCNC: 3.6 MMOL/L (ref 3.5–5.1)
PROT SERPL-MCNC: 6.9 G/DL (ref 6.4–8.2)
PROTHROMBIN TIME: 14.2 SECONDS (ref 11.6–14.8)
SODIUM SERPL-SCNC: 137 MMOL/L (ref 136–145)

## 2022-05-06 PROCEDURE — 83516 IMMUNOASSAY NONANTIBODY: CPT

## 2022-05-06 PROCEDURE — 87340 HEPATITIS B SURFACE AG IA: CPT

## 2022-05-06 PROCEDURE — 86803 HEPATITIS C AB TEST: CPT

## 2022-05-06 PROCEDURE — 86160 COMPLEMENT ANTIGEN: CPT

## 2022-05-06 PROCEDURE — 86235 NUCLEAR ANTIGEN ANTIBODY: CPT

## 2022-05-06 PROCEDURE — 85610 PROTHROMBIN TIME: CPT

## 2022-05-06 PROCEDURE — 80053 COMPREHEN METABOLIC PANEL: CPT

## 2022-05-06 PROCEDURE — 86036 ANCA SCREEN EACH ANTIBODY: CPT

## 2022-05-06 PROCEDURE — 86225 DNA ANTIBODY NATIVE: CPT

## 2022-05-06 PROCEDURE — 86038 ANTINUCLEAR ANTIBODIES: CPT

## 2022-05-06 NOTE — TELEPHONE ENCOUNTER
Labs reviewed  Plan to check serology  (pt will go today)    Continue hydration  BP well controlled  May need to consider bx next week -discussed w/ pt.

## 2022-05-09 ENCOUNTER — TELEPHONE (OUTPATIENT)
Dept: NEPHROLOGY | Facility: CLINIC | Age: 57
End: 2022-05-09

## 2022-05-09 ENCOUNTER — MOBILE ENCOUNTER (OUTPATIENT)
Dept: NEPHROLOGY | Facility: CLINIC | Age: 57
End: 2022-05-09

## 2022-05-09 NOTE — TELEPHONE ENCOUNTER
Pt called from lab to see about orders for blood work she is supposed to have done 5/6 & 5/9, no orders in system. Please advise?

## 2022-05-10 ENCOUNTER — LAB ENCOUNTER (OUTPATIENT)
Dept: LAB | Age: 57
End: 2022-05-10
Attending: INTERNAL MEDICINE
Payer: COMMERCIAL

## 2022-05-10 DIAGNOSIS — N17.9 AKI (ACUTE KIDNEY INJURY) (HCC): ICD-10-CM

## 2022-05-10 LAB
ANION GAP SERPL CALC-SCNC: 7 MMOL/L (ref 0–18)
BUN BLD-MCNC: 18 MG/DL (ref 7–18)
CALCIUM BLD-MCNC: 10.3 MG/DL (ref 8.5–10.1)
CHLORIDE SERPL-SCNC: 110 MMOL/L (ref 98–112)
CO2 SERPL-SCNC: 22 MMOL/L (ref 21–32)
CREAT BLD-MCNC: 1.94 MG/DL
FASTING STATUS PATIENT QL REPORTED: NO
GLUCOSE BLD-MCNC: 139 MG/DL (ref 70–99)
OSMOLALITY SERPL CALC.SUM OF ELEC: 292 MOSM/KG (ref 275–295)
POTASSIUM SERPL-SCNC: 4.7 MMOL/L (ref 3.5–5.1)
SODIUM SERPL-SCNC: 139 MMOL/L (ref 136–145)

## 2022-05-10 PROCEDURE — 80048 BASIC METABOLIC PNL TOTAL CA: CPT

## 2022-05-11 LAB
CENTROMERE AB SER-ACNC: <100 AU/ML (ref ?–100)
DSDNA AB SER-ACNC: <100 IU/ML (ref ?–100)
ENA RNP AB SER-ACNC: 117 AU/ML (ref ?–100)
ENA SCL70 AB SER-ACNC: <100 AU/ML (ref ?–100)
ENA SM AB SER-ACNC: <100 AU/ML (ref ?–100)
ENA SS-A AB SER-ACNC: <100 AU/ML (ref ?–100)
ENA SS-B AB SER-ACNC: <100 AU/ML (ref ?–100)
HISTONE AB SER-ACNC: <100 AU/ML (ref ?–100)
JO-1 AUTOAB: <100 AU/ML (ref ?–100)
MYELOPEROX ANTIBODIES, IGG: 0 AU/ML
SERINE PROTEASE 3, IGG: 1 AU/ML

## 2022-05-12 ENCOUNTER — TELEPHONE (OUTPATIENT)
Dept: NEPHROLOGY | Facility: CLINIC | Age: 57
End: 2022-05-12

## 2022-05-14 ENCOUNTER — PATIENT MESSAGE (OUTPATIENT)
Dept: NEUROLOGY | Facility: CLINIC | Age: 57
End: 2022-05-14

## 2022-05-14 DIAGNOSIS — G43.009 MIGRAINE WITHOUT AURA AND WITHOUT STATUS MIGRAINOSUS, NOT INTRACTABLE: Primary | ICD-10-CM

## 2022-05-16 ENCOUNTER — OFFICE VISIT (OUTPATIENT)
Dept: ENDOCRINOLOGY CLINIC | Facility: CLINIC | Age: 57
End: 2022-05-16
Payer: COMMERCIAL

## 2022-05-16 ENCOUNTER — TELEPHONE (OUTPATIENT)
Dept: ENDOCRINOLOGY CLINIC | Facility: CLINIC | Age: 57
End: 2022-05-16

## 2022-05-16 VITALS
HEIGHT: 64 IN | DIASTOLIC BLOOD PRESSURE: 60 MMHG | HEART RATE: 95 BPM | BODY MASS INDEX: 28.68 KG/M2 | WEIGHT: 168 LBS | RESPIRATION RATE: 16 BRPM | SYSTOLIC BLOOD PRESSURE: 102 MMHG

## 2022-05-16 DIAGNOSIS — N18.4 TYPE 2 DIABETES MELLITUS WITH STAGE 4 CHRONIC KIDNEY DISEASE, WITHOUT LONG-TERM CURRENT USE OF INSULIN (HCC): Primary | ICD-10-CM

## 2022-05-16 DIAGNOSIS — I10 PRIMARY HYPERTENSION: ICD-10-CM

## 2022-05-16 DIAGNOSIS — E78.2 MIXED HYPERLIPIDEMIA: ICD-10-CM

## 2022-05-16 DIAGNOSIS — E11.22 TYPE 2 DIABETES MELLITUS WITH STAGE 4 CHRONIC KIDNEY DISEASE, WITHOUT LONG-TERM CURRENT USE OF INSULIN (HCC): Primary | ICD-10-CM

## 2022-05-16 DIAGNOSIS — R80.9 MICROALBUMINURIA: ICD-10-CM

## 2022-05-16 RX ORDER — ELETRIPTAN HYDROBROMIDE 40 MG/1
TABLET, FILM COATED ORAL
COMMUNITY
Start: 2022-05-05

## 2022-05-16 RX ORDER — RIZATRIPTAN BENZOATE 10 MG/1
10 TABLET ORAL AS NEEDED
COMMUNITY
Start: 2022-05-05

## 2022-05-16 NOTE — TELEPHONE ENCOUNTER
RN ELIF for patient to call back about coming in for a visit with Dr. Lonnie Zamorano tomorrow at 11:40.

## 2022-05-16 NOTE — TELEPHONE ENCOUNTER
beckam per maciel to see if pt would like to see a p;provider in [place] instead of going to [facility]

## 2022-05-16 NOTE — TELEPHONE ENCOUNTER
From: Guilherme Mckeon  To: RAD Haq  Sent: 5/14/2022 7:38 PM CDT  Subject: Intermittent leave    I have been told I get 1 visit every 3 mos for my leave. When Beatrice Calero saw me, she directed me to a nephrologist which needed a PCP referral. She also suggested a pain doc for my neck to see if that care might reduce my migraine pain. Just right there is 5 Dr appts. I don't understand. I have talked to both my nephrologist and my PCP who both said they do not plan on getting involved in the intermittent leave. They both say all of their problems stem from the migraines and continuous vomiting. While they will care for me and investigate all possible other causes,they both believe this is essentially a neurology problem. I don't even know what of all this is most stressful. I have told Beatrice Calero I continue to vomit. I have daily migraines not \"breakthrough\" ones. I understand she wants me to wait to see if the ajovy will kick in but I don't get that I have an appt with her 3 mos away. I do know she will continue to communicate through my chart,but I am feeling so scared. I do have one spot of hope in that I have not vomited for 2 days straight which has not happened. Could I be turning a corner? I have weekly labs an upcoming kidney biopsy,upper and lower GI. A diabetes care team appt is scheduled for Mon. I need adjustments considering I am vomiting so much. I am off my psych meds. Enough said. I request off as much as possible. This pay period I worked 4 days. I am concerned I have something more serious than migraines frankly. I just don't want to put my health second anymore. What do I do next?

## 2022-05-16 NOTE — TELEPHONE ENCOUNTER
Pt called and informed the office she would be at the appt tomorrow at 11:40. No further action needed.

## 2022-05-17 ENCOUNTER — LAB ENCOUNTER (OUTPATIENT)
Dept: LAB | Age: 57
End: 2022-05-17
Attending: INTERNAL MEDICINE
Payer: COMMERCIAL

## 2022-05-17 ENCOUNTER — OFFICE VISIT (OUTPATIENT)
Dept: NEUROLOGY | Facility: CLINIC | Age: 57
End: 2022-05-17
Payer: COMMERCIAL

## 2022-05-17 ENCOUNTER — TELEPHONE (OUTPATIENT)
Dept: NEUROLOGY | Facility: CLINIC | Age: 57
End: 2022-05-17

## 2022-05-17 ENCOUNTER — LAB ENCOUNTER (OUTPATIENT)
Dept: LAB | Age: 57
End: 2022-05-17
Attending: FAMILY MEDICINE
Payer: COMMERCIAL

## 2022-05-17 VITALS
BODY MASS INDEX: 28.68 KG/M2 | WEIGHT: 168 LBS | SYSTOLIC BLOOD PRESSURE: 135 MMHG | HEIGHT: 64 IN | DIASTOLIC BLOOD PRESSURE: 74 MMHG | RESPIRATION RATE: 16 BRPM | HEART RATE: 88 BPM

## 2022-05-17 VITALS — BODY MASS INDEX: 28.68 KG/M2 | WEIGHT: 168 LBS | HEIGHT: 64 IN

## 2022-05-17 DIAGNOSIS — G43.711 MIGRAINE, CHRONIC, WITHOUT AURA, INTRACTABLE, WITH STATUS MIGRAINOSUS: Primary | ICD-10-CM

## 2022-05-17 DIAGNOSIS — N17.9 AKI (ACUTE KIDNEY INJURY) (HCC): ICD-10-CM

## 2022-05-17 DIAGNOSIS — M54.2 CERVICALGIA: ICD-10-CM

## 2022-05-17 LAB
ANION GAP SERPL CALC-SCNC: 6 MMOL/L (ref 0–18)
BUN BLD-MCNC: 16 MG/DL (ref 7–18)
CALCIUM BLD-MCNC: 9.7 MG/DL (ref 8.5–10.1)
CHLORIDE SERPL-SCNC: 110 MMOL/L (ref 98–112)
CO2 SERPL-SCNC: 24 MMOL/L (ref 21–32)
CREAT BLD-MCNC: 1.99 MG/DL
FASTING STATUS PATIENT QL REPORTED: NO
GLUCOSE BLD-MCNC: 148 MG/DL (ref 70–99)
MAGNESIUM SERPL-MCNC: 2.4 MG/DL (ref 1.6–2.6)
OSMOLALITY SERPL CALC.SUM OF ELEC: 294 MOSM/KG (ref 275–295)
PHOSPHATE SERPL-MCNC: 4.3 MG/DL (ref 2.5–4.9)
POTASSIUM SERPL-SCNC: 3.3 MMOL/L (ref 3.5–5.1)
SODIUM SERPL-SCNC: 140 MMOL/L (ref 136–145)

## 2022-05-17 PROCEDURE — 3078F DIAST BP <80 MM HG: CPT | Performed by: OTHER

## 2022-05-17 PROCEDURE — 83735 ASSAY OF MAGNESIUM: CPT

## 2022-05-17 PROCEDURE — 99214 OFFICE O/P EST MOD 30 MIN: CPT | Performed by: OTHER

## 2022-05-17 PROCEDURE — 3008F BODY MASS INDEX DOCD: CPT | Performed by: OTHER

## 2022-05-17 PROCEDURE — 80048 BASIC METABOLIC PNL TOTAL CA: CPT

## 2022-05-17 PROCEDURE — 84100 ASSAY OF PHOSPHORUS: CPT

## 2022-05-17 PROCEDURE — 3075F SYST BP GE 130 - 139MM HG: CPT | Performed by: OTHER

## 2022-05-17 RX ORDER — ATOGEPANT 10 MG/1
10 TABLET ORAL DAILY
Qty: 30 TABLET | Refills: 5 | Status: SHIPPED | OUTPATIENT
Start: 2022-05-17

## 2022-05-17 RX ORDER — NIFEDIPINE 30 MG/1
30 TABLET, FILM COATED, EXTENDED RELEASE ORAL DAILY
COMMUNITY

## 2022-05-17 RX ORDER — ONDANSETRON 4 MG/1
TABLET, FILM COATED ORAL
Qty: 30 TABLET | Refills: 0 | Status: SHIPPED | OUTPATIENT
Start: 2022-05-17

## 2022-05-17 NOTE — TELEPHONE ENCOUNTER
PA requested for Qulipta  Clinical questions answered and submitted to insurance  Awaiting coverage determination

## 2022-05-18 ENCOUNTER — TELEPHONE (OUTPATIENT)
Dept: NEUROLOGY | Facility: CLINIC | Age: 57
End: 2022-05-18

## 2022-05-18 LAB — SARS-COV-2 RNA RESP QL NAA+PROBE: NOT DETECTED

## 2022-05-18 NOTE — TELEPHONE ENCOUNTER
Discussed with provider in office visit pt may return to work part-time for 2 months. Please sign attached letter.

## 2022-05-19 ENCOUNTER — TELEPHONE (OUTPATIENT)
Dept: NEPHROLOGY | Facility: CLINIC | Age: 57
End: 2022-05-19

## 2022-05-19 RX ORDER — MIDAZOLAM HYDROCHLORIDE 1 MG/ML
1 INJECTION INTRAMUSCULAR; INTRAVENOUS EVERY 5 MIN PRN
Status: CANCELLED | OUTPATIENT
Start: 2022-05-20 | End: 2022-05-20

## 2022-05-19 RX ORDER — POTASSIUM CHLORIDE 20 MEQ/1
20 TABLET, EXTENDED RELEASE ORAL 2 TIMES DAILY
Qty: 60 TABLET | Refills: 1 | Status: SHIPPED | OUTPATIENT
Start: 2022-05-19

## 2022-05-19 RX ORDER — FLUMAZENIL 0.1 MG/ML
0.2 INJECTION, SOLUTION INTRAVENOUS AS NEEDED
Status: CANCELLED | OUTPATIENT
Start: 2022-05-19

## 2022-05-19 RX ORDER — NALOXONE HYDROCHLORIDE 0.4 MG/ML
80 INJECTION, SOLUTION INTRAMUSCULAR; INTRAVENOUS; SUBCUTANEOUS AS NEEDED
Status: CANCELLED | OUTPATIENT
Start: 2022-05-19

## 2022-05-19 RX ORDER — SODIUM CHLORIDE 9 MG/ML
INJECTION, SOLUTION INTRAVENOUS CONTINUOUS
Status: CANCELLED | OUTPATIENT
Start: 2022-05-19

## 2022-05-20 ENCOUNTER — HOSPITAL ENCOUNTER (OUTPATIENT)
Dept: CT IMAGING | Facility: HOSPITAL | Age: 57
Discharge: HOME | End: 2022-05-20
Attending: INTERNAL MEDICINE
Payer: COMMERCIAL

## 2022-05-20 ENCOUNTER — HOSPITAL ENCOUNTER (OUTPATIENT)
Dept: ULTRASOUND IMAGING | Facility: HOSPITAL | Age: 57
Discharge: HOME | End: 2022-05-20
Attending: INTERNAL MEDICINE
Payer: COMMERCIAL

## 2022-05-20 ENCOUNTER — NURSE ONLY (OUTPATIENT)
Dept: LAB | Facility: HOSPITAL | Age: 57
End: 2022-05-20
Attending: INTERNAL MEDICINE
Payer: COMMERCIAL

## 2022-05-20 VITALS
HEART RATE: 95 BPM | TEMPERATURE: 97 F | OXYGEN SATURATION: 98 % | SYSTOLIC BLOOD PRESSURE: 111 MMHG | DIASTOLIC BLOOD PRESSURE: 71 MMHG | RESPIRATION RATE: 18 BRPM

## 2022-05-20 DIAGNOSIS — N17.9 AKI (ACUTE KIDNEY INJURY) (HCC): ICD-10-CM

## 2022-05-20 DIAGNOSIS — N17.9 ACUTE KIDNEY INJURY (HCC): Primary | ICD-10-CM

## 2022-05-20 DIAGNOSIS — N17.9 ACUTE KIDNEY INJURY (HCC): ICD-10-CM

## 2022-05-20 LAB
ERYTHROCYTE [DISTWIDTH] IN BLOOD BY AUTOMATED COUNT: 14.6 %
GLUCOSE BLD-MCNC: 144 MG/DL (ref 70–99)
GLUCOSE BLD-MCNC: 146 MG/DL (ref 70–99)
HCT VFR BLD AUTO: 32.9 %
HGB BLD-MCNC: 10.5 G/DL
INR BLD: 1.09 (ref 0.8–1.2)
MCH RBC QN AUTO: 30 PG (ref 26–34)
MCHC RBC AUTO-ENTMCNC: 31.9 G/DL (ref 31–37)
MCV RBC AUTO: 94 FL
PLATELET # BLD AUTO: 297 10(3)UL (ref 150–450)
PROTHROMBIN TIME: 14.1 SECONDS (ref 11.6–14.8)
RBC # BLD AUTO: 3.5 X10(6)UL
WBC # BLD AUTO: 6.4 X10(3) UL (ref 4–11)

## 2022-05-20 PROCEDURE — 76942 ECHO GUIDE FOR BIOPSY: CPT | Performed by: INTERNAL MEDICINE

## 2022-05-20 PROCEDURE — 85610 PROTHROMBIN TIME: CPT

## 2022-05-20 PROCEDURE — 36415 COLL VENOUS BLD VENIPUNCTURE: CPT

## 2022-05-20 PROCEDURE — 99152 MOD SED SAME PHYS/QHP 5/>YRS: CPT | Performed by: INTERNAL MEDICINE

## 2022-05-20 PROCEDURE — 50200 RENAL BIOPSY PERQ: CPT | Performed by: INTERNAL MEDICINE

## 2022-05-20 PROCEDURE — 85027 COMPLETE CBC AUTOMATED: CPT

## 2022-05-20 PROCEDURE — 82962 GLUCOSE BLOOD TEST: CPT

## 2022-05-20 RX ORDER — FLUMAZENIL 0.1 MG/ML
INJECTION, SOLUTION INTRAVENOUS
Status: DISCONTINUED
Start: 2022-05-20 | End: 2022-05-20 | Stop reason: WASHOUT

## 2022-05-20 RX ORDER — MORPHINE SULFATE 2 MG/ML
2 INJECTION, SOLUTION INTRAMUSCULAR; INTRAVENOUS EVERY 2 HOUR PRN
Status: DISCONTINUED | OUTPATIENT
Start: 2022-05-20 | End: 2022-05-22

## 2022-05-20 RX ORDER — NALOXONE HYDROCHLORIDE 0.4 MG/ML
80 INJECTION, SOLUTION INTRAMUSCULAR; INTRAVENOUS; SUBCUTANEOUS AS NEEDED
Status: DISCONTINUED | OUTPATIENT
Start: 2022-05-20 | End: 2022-05-22

## 2022-05-20 RX ORDER — HYDROCODONE BITARTRATE AND ACETAMINOPHEN 5; 325 MG/1; MG/1
2 TABLET ORAL EVERY 4 HOURS PRN
Status: DISCONTINUED | OUTPATIENT
Start: 2022-05-20 | End: 2022-05-22

## 2022-05-20 RX ORDER — SODIUM CHLORIDE 9 MG/ML
INJECTION, SOLUTION INTRAVENOUS CONTINUOUS
Status: DISCONTINUED | OUTPATIENT
Start: 2022-05-20 | End: 2022-05-22

## 2022-05-20 RX ORDER — NALOXONE HYDROCHLORIDE 0.4 MG/ML
INJECTION, SOLUTION INTRAMUSCULAR; INTRAVENOUS; SUBCUTANEOUS
Status: DISCONTINUED
Start: 2022-05-20 | End: 2022-05-20 | Stop reason: WASHOUT

## 2022-05-20 RX ORDER — FLUMAZENIL 0.1 MG/ML
0.2 INJECTION, SOLUTION INTRAVENOUS AS NEEDED
Status: DISCONTINUED | OUTPATIENT
Start: 2022-05-20 | End: 2022-05-22

## 2022-05-20 RX ORDER — ACETAMINOPHEN 325 MG/1
650 TABLET ORAL EVERY 6 HOURS PRN
Status: DISCONTINUED | OUTPATIENT
Start: 2022-05-20 | End: 2022-05-22

## 2022-05-20 RX ORDER — HYDROCODONE BITARTRATE AND ACETAMINOPHEN 5; 325 MG/1; MG/1
1 TABLET ORAL EVERY 4 HOURS PRN
Status: DISCONTINUED | OUTPATIENT
Start: 2022-05-20 | End: 2022-05-22

## 2022-05-20 RX ORDER — MIDAZOLAM HYDROCHLORIDE 1 MG/ML
1 INJECTION INTRAMUSCULAR; INTRAVENOUS EVERY 5 MIN PRN
Status: ACTIVE | OUTPATIENT
Start: 2022-05-20 | End: 2022-05-20

## 2022-05-20 RX ORDER — MIDAZOLAM HYDROCHLORIDE 1 MG/ML
INJECTION INTRAMUSCULAR; INTRAVENOUS
Status: COMPLETED
Start: 2022-05-20 | End: 2022-05-20

## 2022-05-20 RX ADMIN — SODIUM CHLORIDE: 9 INJECTION, SOLUTION INTRAVENOUS at 09:11:00

## 2022-05-20 RX ADMIN — MIDAZOLAM HYDROCHLORIDE 1 MG: 1 INJECTION INTRAMUSCULAR; INTRAVENOUS at 09:23:00

## 2022-05-20 NOTE — IMAGING NOTE
Received pt to [facility]. Pt verified name and  as well as allergies. Pt states NPO since 2100 last night. Pt states she does not take blood thinners. Lab results of PT/INR and PLT reviewed. Informed consent obtained by Dr. Sloan Salas. Pt positioned  prone on [place]. CRM and pulse ox monitoring applied, alarms enabled. Sterile prep and 1% lido to area by Dr. Sloan Salas. Specimens obtained. Bacitracin and tegaderm dressing site applied to CDI with drop of blood noted. Pt supine on [facility] post procedure. Pt awake and alert, conversing appropriately and in no apparent distress. SBAR called to [facility] in [facility]. Pt transported to room 2258 with belongings. Pt accompanied by this RN, US tech, Swapna Plush significant other [phone].

## 2022-05-20 NOTE — PROCEDURES
[facility]  Procedure Note    Kevin Webb Patient Status:  Outpatient    1965 MRN [id number]   [facility] ULTRASOUND Attending Pancho Da Silva MD   Hosp Day # 0 PCP HEATHER RIVERA, DO     Procedure: US guided core biopsy L kidney    Pre-Procedure Diagnosis:  Renal insufficiency    Post-Procedure Diagnosis: Same    Anesthesia:  Local and Sedation    Findings:  2 x 18g core of L kidney    Specimens: As above    Blood Loss:  Minimal    Tourniquet Time: None  Complications:  None  Drains:  None    Secondary Diagnosis:  None    Helen Mackenzie MD  2022

## 2022-05-23 ENCOUNTER — MOBILE ENCOUNTER (OUTPATIENT)
Dept: NEPHROLOGY | Facility: CLINIC | Age: 57
End: 2022-05-23

## 2022-05-23 DIAGNOSIS — N17.9 AKI (ACUTE KIDNEY INJURY) (HCC): Primary | ICD-10-CM

## 2022-05-24 ENCOUNTER — MED REC SCAN ONLY (OUTPATIENT)
Dept: NEPHROLOGY | Facility: CLINIC | Age: 57
End: 2022-05-24

## 2022-05-24 RX ORDER — ATOGEPANT 10 MG/1
10 TABLET ORAL DAILY
Qty: 30 TABLET | Refills: 0 | Status: SHIPPED | OUTPATIENT
Start: 2022-05-24

## 2022-05-25 ENCOUNTER — PATIENT MESSAGE (OUTPATIENT)
Dept: FAMILY MEDICINE CLINIC | Facility: CLINIC | Age: 57
End: 2022-05-25

## 2022-05-25 ENCOUNTER — PATIENT MESSAGE (OUTPATIENT)
Dept: ENDOCRINOLOGY CLINIC | Facility: CLINIC | Age: 57
End: 2022-05-25

## 2022-05-25 NOTE — TELEPHONE ENCOUNTER
Pt may need additional medication such as prandial insulin, please have her schedule a follow up with maciel in the next week if possible

## 2022-05-25 NOTE — TELEPHONE ENCOUNTER
Request for Qulipta 10 mg tablets has been denied. Patient must have tried and or had poor response to 2 formulary alternative drugs. Some formulary drugs are: Aimovig injection, Emgality injection, Ajovy injection and Nurtec tablets. Patient has tried and failed Nurtec tablets. Prescription sent to [address] [place] [address]  to attempt authorization for medication.

## 2022-05-25 NOTE — TELEPHONE ENCOUNTER
From: Damien Melgar  To: VIOLET Guerrero  Sent: 5/25/2022 7:55 AM CDT  Subject: High Blood Sugars    Good morning. .help. I started a prednisone taper schedule yesterday afternoon. I had planned to call today but I think I need a plan urgently. I am on 60mg tapering down every 3 days by 10 and then the last week 5mg. It is a long haul. The problem is I am still vomiting and nauseous (although much better). Of course proteins have been what I am most adverse which gives me more of a stabilization issue. I took my dose yesterday afternoon, and by night I was 220. I just woke and was 213. I eat small amounts frequently. I will adjust as best I can to your ideas. A little nervous waking to 213. Verner Sees go eat something though because having an empty stomach makes me nauseous of course.  Thanks for your help Fredy Dhaliwal

## 2022-05-25 NOTE — TELEPHONE ENCOUNTER
Instructed patient to drink plenty of liquids and eat bland foods. Continue checking BS and call the office if they continue to increase. Will see provider tomorrow morning at 8:30am for fast acting insulin regemin for the duration of the steroids as needed.

## 2022-05-25 NOTE — TELEPHONE ENCOUNTER
Pt left message on nurse line, she is concerned about BG due to recent start of steroid taper. . Pt was told to call if over 180. Returned call, pt with acute kidney injury so had to stop metformin. Vomits due to migraines, needs some food in stomach, has to eat things that she feels she can keep down, has hard time keeping protein down.

## 2022-05-26 ENCOUNTER — LABORATORY ENCOUNTER (OUTPATIENT)
Dept: LAB | Age: 57
End: 2022-05-26
Attending: INTERNAL MEDICINE
Payer: COMMERCIAL

## 2022-05-26 ENCOUNTER — OFFICE VISIT (OUTPATIENT)
Dept: ENDOCRINOLOGY CLINIC | Facility: CLINIC | Age: 57
End: 2022-05-26
Payer: COMMERCIAL

## 2022-05-26 VITALS
WEIGHT: 165 LBS | SYSTOLIC BLOOD PRESSURE: 100 MMHG | HEART RATE: 86 BPM | BODY MASS INDEX: 28.17 KG/M2 | RESPIRATION RATE: 16 BRPM | HEIGHT: 64 IN | DIASTOLIC BLOOD PRESSURE: 66 MMHG

## 2022-05-26 DIAGNOSIS — N17.9 AKI (ACUTE KIDNEY INJURY) (HCC): ICD-10-CM

## 2022-05-26 DIAGNOSIS — E11.22 TYPE 2 DIABETES MELLITUS WITH STAGE 4 CHRONIC KIDNEY DISEASE, WITHOUT LONG-TERM CURRENT USE OF INSULIN (HCC): Primary | ICD-10-CM

## 2022-05-26 DIAGNOSIS — N18.4 TYPE 2 DIABETES MELLITUS WITH STAGE 4 CHRONIC KIDNEY DISEASE, WITHOUT LONG-TERM CURRENT USE OF INSULIN (HCC): Primary | ICD-10-CM

## 2022-05-26 LAB
ANION GAP SERPL CALC-SCNC: 6 MMOL/L (ref 0–18)
BUN BLD-MCNC: 29 MG/DL (ref 7–18)
CALCIUM BLD-MCNC: 10.2 MG/DL (ref 8.5–10.1)
CHLORIDE SERPL-SCNC: 109 MMOL/L (ref 98–112)
CO2 SERPL-SCNC: 21 MMOL/L (ref 21–32)
CREAT BLD-MCNC: 1.74 MG/DL
CREAT UR-SCNC: 69.3 MG/DL
FASTING STATUS PATIENT QL REPORTED: NO
GLUCOSE BLD-MCNC: 210 MG/DL (ref 70–99)
MAGNESIUM SERPL-MCNC: 2.5 MG/DL (ref 1.6–2.6)
OSMOLALITY SERPL CALC.SUM OF ELEC: 294 MOSM/KG (ref 275–295)
PHOSPHATE SERPL-MCNC: 3.7 MG/DL (ref 2.5–4.9)
POTASSIUM SERPL-SCNC: 4.8 MMOL/L (ref 3.5–5.1)
PROT UR-MCNC: 109.5 MG/DL
SODIUM SERPL-SCNC: 136 MMOL/L (ref 136–145)

## 2022-05-26 PROCEDURE — 82570 ASSAY OF URINE CREATININE: CPT

## 2022-05-26 PROCEDURE — 84100 ASSAY OF PHOSPHORUS: CPT

## 2022-05-26 PROCEDURE — 83735 ASSAY OF MAGNESIUM: CPT

## 2022-05-26 PROCEDURE — 3008F BODY MASS INDEX DOCD: CPT | Performed by: NURSE PRACTITIONER

## 2022-05-26 PROCEDURE — 36415 COLL VENOUS BLD VENIPUNCTURE: CPT

## 2022-05-26 PROCEDURE — 99214 OFFICE O/P EST MOD 30 MIN: CPT | Performed by: NURSE PRACTITIONER

## 2022-05-26 PROCEDURE — 3078F DIAST BP <80 MM HG: CPT | Performed by: NURSE PRACTITIONER

## 2022-05-26 PROCEDURE — 84156 ASSAY OF PROTEIN URINE: CPT

## 2022-05-26 PROCEDURE — 3074F SYST BP LT 130 MM HG: CPT | Performed by: NURSE PRACTITIONER

## 2022-05-26 PROCEDURE — 80048 BASIC METABOLIC PNL TOTAL CA: CPT

## 2022-05-26 RX ORDER — PREDNISONE 10 MG/1
TABLET ORAL
COMMUNITY
Start: 2022-05-23

## 2022-05-26 RX ORDER — BLOOD-GLUCOSE METER
EACH MISCELLANEOUS
COMMUNITY

## 2022-05-26 RX ORDER — INSULIN LISPRO 100 [IU]/ML
INJECTION, SOLUTION INTRAVENOUS; SUBCUTANEOUS
Qty: 3 ML | Refills: 0 | COMMUNITY
Start: 2022-05-26

## 2022-05-26 NOTE — PATIENT INSTRUCTIONS
We are here to support you with Diabetes but please remember that you still need your primary care doctor for your routine health maintenance. Your current A1C: 6.8%  This test provides us with your average blood sugar for the past 3 months. The main goal of diabetes treatment is to keep your sugar from going too high. We measure your overall blood sugar trends with a Hemoglobin A1C test. (also called an A1C)  For most people the target is less than 7.0% but sometimes we make exceptions based on age, health history and other factors. Keeping an A1C less than 7% helps prevents diabetes related health problems. If your A1C goes too high, then we need to talk about changing your current diabetes treatment. MEDICATIONS:   It is important to take all of your medications as prescribed. Please call me if you cannot get the prescriptions filled or are having issues with refills. Also, please call me if you have any issues with medication questions, side effects, dosing questions or problems with your blood sugar trends BEFORE CHANGING OR STOPPING ANY MEDICATIONS. Continue Trulicity 3.5MS injection weekly  Continue pioglitazone 30mg daily  Start Humalog sliding scale: You will need to check your blood sugar before each meal.  Please follow the scale below for your insulin dose: If blood sugar is 200-250, take 2 units of Humalog  If blood sugar is 251-300, take 4 units of Humalog  If blood sugar is 301-350, take 6 units of Humalog  If blood sugar is > 351, take 8 units of Humalog    Blood sugar testing:   Always bring your glucose meter or blood sugar logbook to every appointment here at the diabetes center. This allows me to safely make adjustments to your diabetes plan. In order for me to determine any patterns in your blood sugars, you will need to test your blood sugar 4 times daily   Call if 2 readings below 80 mg/dl in 1 week for medication adjustment.      Blood sugar targets:  Before breakfast:  (preferably < 110)  2 hours After meals: less than 180 (preferably less than 150)   Call for persistent blood sugars < 75 or > 200. Blood sugars greater than 200 are not acceptable to reach your goal of improving diabetes      Health Maintenance:   1. LABS: It is important to monitor your kidney function (blood and urine protein levels) , liver function tests and cholesterol levels when you have diabetes. 2. FOOT EXAMS:  daily foot inspections for foot wounds or skin changes are important for foot care. Any unusual changes should be reported immediately. 3. EYE EXAMS: Checking your eyes for diabetes changes is important and you should have a dilated eye exam done by an eye doctor EVERY year since these changes occur in the BACK of the eye and not visible by you. Please let me know if you need provider list for eye doctor. Lifestyle Therapy:    1. NUTRITION: Maintain optimal weight, calorie restriction if overweight, plant-based diet    2. PHYSICAL ACTIVITY: 150 minutes per week (30 minutes a day 5 days a week) of moderate exertion such as walking, stair climbing. Include strength training 2-3 times per week. Increase as tolerated. 3. SLEEP: Try and get 7-8 hours of sleep per night    4. BEHAVIOR:  Tobacco cessation and alcohol in moderation      Reminders:  Due for dilated eye exam    Suyapa ESPINAL, BC-ADM, [facility]  [address], [address], [address]  [address] [place] [address], [address] [facility], [address]  [address], [address]  Diabetes Services at [address] [facility] [address]

## 2022-05-31 ENCOUNTER — TELEPHONE (OUTPATIENT)
Dept: NEPHROLOGY | Facility: CLINIC | Age: 57
End: 2022-05-31

## 2022-05-31 DIAGNOSIS — N17.9 AKI (ACUTE KIDNEY INJURY) (HCC): Primary | ICD-10-CM

## 2022-06-01 ENCOUNTER — TELEPHONE (OUTPATIENT)
Dept: NEUROLOGY | Facility: CLINIC | Age: 57
End: 2022-06-01

## 2022-06-01 NOTE — TELEPHONE ENCOUNTER
RN followed up with the patient to confirm she has received the prescription for Horan Bella. Per the patient Dr. Malaika Rivas wants the patient to hold off on the Horan Bella until her creatinine level lowers. Per patient she was put on prednisone for her creatinine level. RN provided the [address] [place] [address] number [phone] if she had any further questions.

## 2022-06-07 ENCOUNTER — OFFICE VISIT (OUTPATIENT)
Dept: PAIN CLINIC | Facility: CLINIC | Age: 57
End: 2022-06-07
Payer: COMMERCIAL

## 2022-06-07 ENCOUNTER — LAB ENCOUNTER (OUTPATIENT)
Dept: LAB | Facility: HOSPITAL | Age: 57
End: 2022-06-07
Attending: INTERNAL MEDICINE
Payer: COMMERCIAL

## 2022-06-07 VITALS — OXYGEN SATURATION: 98 % | SYSTOLIC BLOOD PRESSURE: 110 MMHG | DIASTOLIC BLOOD PRESSURE: 82 MMHG | HEART RATE: 92 BPM

## 2022-06-07 DIAGNOSIS — N17.9 AKI (ACUTE KIDNEY INJURY) (HCC): ICD-10-CM

## 2022-06-07 DIAGNOSIS — M54.81 BILATERAL OCCIPITAL NEURALGIA: Primary | ICD-10-CM

## 2022-06-07 LAB
ANION GAP SERPL CALC-SCNC: 7 MMOL/L (ref 0–18)
BASOPHILS # BLD AUTO: 0.03 X10(3) UL (ref 0–0.2)
BASOPHILS NFR BLD AUTO: 0.2 %
BILIRUB UR QL STRIP.AUTO: NEGATIVE
BUN BLD-MCNC: 27 MG/DL (ref 7–18)
CALCIUM BLD-MCNC: 9.6 MG/DL (ref 8.5–10.1)
CHLORIDE SERPL-SCNC: 105 MMOL/L (ref 98–112)
CLARITY UR REFRACT.AUTO: CLEAR
CO2 SERPL-SCNC: 25 MMOL/L (ref 21–32)
COLOR UR AUTO: YELLOW
CREAT BLD-MCNC: 1.31 MG/DL
CREAT UR-SCNC: 61.7 MG/DL
EOSINOPHIL # BLD AUTO: 0.01 X10(3) UL (ref 0–0.7)
EOSINOPHIL NFR BLD AUTO: 0.1 %
ERYTHROCYTE [DISTWIDTH] IN BLOOD BY AUTOMATED COUNT: 13.2 %
FASTING STATUS PATIENT QL REPORTED: NO
GLUCOSE BLD-MCNC: 278 MG/DL (ref 70–99)
GLUCOSE UR STRIP.AUTO-MCNC: >=500 MG/DL
HCT VFR BLD AUTO: 34.6 %
HGB BLD-MCNC: 11 G/DL
IMM GRANULOCYTES # BLD AUTO: 0.1 X10(3) UL (ref 0–1)
IMM GRANULOCYTES NFR BLD: 0.7 %
KETONES UR STRIP.AUTO-MCNC: NEGATIVE MG/DL
LYMPHOCYTES # BLD AUTO: 2.01 X10(3) UL (ref 1–4)
LYMPHOCYTES NFR BLD AUTO: 14.3 %
MAGNESIUM SERPL-MCNC: 2.1 MG/DL (ref 1.6–2.6)
MCH RBC QN AUTO: 30.9 PG (ref 26–34)
MCHC RBC AUTO-ENTMCNC: 31.8 G/DL (ref 31–37)
MCV RBC AUTO: 97.2 FL
MONOCYTES # BLD AUTO: 0.64 X10(3) UL (ref 0.1–1)
MONOCYTES NFR BLD AUTO: 4.6 %
NEUTROPHILS # BLD AUTO: 11.24 X10 (3) UL (ref 1.5–7.7)
NEUTROPHILS # BLD AUTO: 11.24 X10(3) UL (ref 1.5–7.7)
NEUTROPHILS NFR BLD AUTO: 80.1 %
NITRITE UR QL STRIP.AUTO: NEGATIVE
OSMOLALITY SERPL CALC.SUM OF ELEC: 299 MOSM/KG (ref 275–295)
PH UR STRIP.AUTO: 6 [PH] (ref 5–8)
PHOSPHATE SERPL-MCNC: 4.1 MG/DL (ref 2.5–4.9)
PLATELET # BLD AUTO: 300 10(3)UL (ref 150–450)
POTASSIUM SERPL-SCNC: 4.1 MMOL/L (ref 3.5–5.1)
PROT UR STRIP.AUTO-MCNC: 30 MG/DL
PROT UR-MCNC: 57.5 MG/DL
RBC # BLD AUTO: 3.56 X10(6)UL
RBC UR QL AUTO: NEGATIVE
SODIUM SERPL-SCNC: 137 MMOL/L (ref 136–145)
SP GR UR STRIP.AUTO: 1.02 (ref 1–1.03)
UROBILINOGEN UR STRIP.AUTO-MCNC: <2 MG/DL
WBC # BLD AUTO: 14 X10(3) UL (ref 4–11)
YEAST UR QL: PRESENT /HPF

## 2022-06-07 PROCEDURE — 84100 ASSAY OF PHOSPHORUS: CPT

## 2022-06-07 PROCEDURE — 82570 ASSAY OF URINE CREATININE: CPT

## 2022-06-07 PROCEDURE — 85025 COMPLETE CBC W/AUTO DIFF WBC: CPT

## 2022-06-07 PROCEDURE — 80048 BASIC METABOLIC PNL TOTAL CA: CPT

## 2022-06-07 PROCEDURE — 84156 ASSAY OF PROTEIN URINE: CPT

## 2022-06-07 PROCEDURE — 36415 COLL VENOUS BLD VENIPUNCTURE: CPT

## 2022-06-07 PROCEDURE — 81001 URINALYSIS AUTO W/SCOPE: CPT

## 2022-06-07 PROCEDURE — 3074F SYST BP LT 130 MM HG: CPT | Performed by: ANESTHESIOLOGY

## 2022-06-07 PROCEDURE — 3079F DIAST BP 80-89 MM HG: CPT | Performed by: ANESTHESIOLOGY

## 2022-06-07 PROCEDURE — 99213 OFFICE O/P EST LOW 20 MIN: CPT | Performed by: ANESTHESIOLOGY

## 2022-06-07 PROCEDURE — 83735 ASSAY OF MAGNESIUM: CPT

## 2022-06-07 NOTE — PROGRESS NOTES
Patient presents in office today with reported pain in neck radiating into head.  Left hip pain also     Current pain level reported =5 /10    Last reported dose of tylenol last night      Narcotic Contract renewal na    Urine Drug screen na

## 2022-06-10 ENCOUNTER — VIRTUAL PHONE E/M (OUTPATIENT)
Dept: ENDOCRINOLOGY CLINIC | Facility: CLINIC | Age: 57
End: 2022-06-10
Payer: COMMERCIAL

## 2022-06-10 ENCOUNTER — TELEPHONE (OUTPATIENT)
Dept: NEUROLOGY | Facility: CLINIC | Age: 57
End: 2022-06-10

## 2022-06-10 DIAGNOSIS — N18.4 TYPE 2 DIABETES MELLITUS WITH STAGE 4 CHRONIC KIDNEY DISEASE, WITHOUT LONG-TERM CURRENT USE OF INSULIN (HCC): Primary | ICD-10-CM

## 2022-06-10 DIAGNOSIS — E11.22 TYPE 2 DIABETES MELLITUS WITH STAGE 4 CHRONIC KIDNEY DISEASE, WITHOUT LONG-TERM CURRENT USE OF INSULIN (HCC): Primary | ICD-10-CM

## 2022-06-10 DIAGNOSIS — M54.81 BILATERAL OCCIPITAL NEURALGIA: Primary | ICD-10-CM

## 2022-06-10 NOTE — TELEPHONE ENCOUNTER
Prior authorization request completed for: Bilateral Occipital NB  Authorization # NO PRIOR AUTHORIZATION REQUIRED  Authorization dates: N/A  CPT codes approved: 85024  Number of visits/dates of service approved: 1  Physician: Dr. Heath Chand   Location: [facility]     Call Ref#:[id number]  Representative Name: [employer]     Patient can be scheduled. Routed to Navigator.

## 2022-06-10 NOTE — TELEPHONE ENCOUNTER
Question Answer   Anesthesia Type Sedation   Provider [facility]   Location Lab   Procedure Other (please add comment)   Implants No   Medical clearance requested (will send to Pain Navigator) No   Patient has Medicare coverage?  No   Comments (Please list entire procedure name here.) bilateral greater occipital nerve block with intravenous sedation

## 2022-06-12 ENCOUNTER — PATIENT MESSAGE (OUTPATIENT)
Dept: NEUROLOGY | Facility: CLINIC | Age: 57
End: 2022-06-12

## 2022-06-13 ENCOUNTER — PATIENT MESSAGE (OUTPATIENT)
Dept: ENDOCRINOLOGY CLINIC | Facility: CLINIC | Age: 57
End: 2022-06-13

## 2022-06-13 ENCOUNTER — TELEPHONE (OUTPATIENT)
Dept: NEUROLOGY | Facility: CLINIC | Age: 57
End: 2022-06-13

## 2022-06-13 DIAGNOSIS — G43.711 MIGRAINE, CHRONIC, WITHOUT AURA, INTRACTABLE, WITH STATUS MIGRAINOSUS: Primary | ICD-10-CM

## 2022-06-13 RX ORDER — ZOLMITRIPTAN 5 MG/1
SPRAY NASAL
Qty: 6 EACH | Refills: 2 | Status: SHIPPED | OUTPATIENT
Start: 2022-06-13

## 2022-06-13 RX ORDER — ONDANSETRON 4 MG/1
TABLET, FILM COATED ORAL
Qty: 30 TABLET | Refills: 0 | Status: SHIPPED | OUTPATIENT
Start: 2022-06-13

## 2022-06-13 RX ORDER — ELETRIPTAN HYDROBROMIDE 40 MG/1
TABLET, FILM COATED ORAL
Qty: 8 TABLET | Refills: 2 | Status: SHIPPED | OUTPATIENT
Start: 2022-06-13

## 2022-06-13 NOTE — TELEPHONE ENCOUNTER
She is going through a lot with doctors but GI is still important for screening colonoscopy. Would highly recommend she get it done sooner rather than later. Would also recommend follow up with her dermatologist for flare up of lichen sclerosis.

## 2022-06-13 NOTE — TELEPHONE ENCOUNTER
Patient returned call with condition update. See more information in note below. Migraine is resolved, nausea and vomiting resolved as well. Finished oral steroid yesterday, per patient. Patient to continue with Lorin Pascal, has been 2 weeks only. Wakes up with stiff neck, pain provider wants to do bilateral occipital nerve block injections. Discussed priorities when multiple issues. Discussed patient's right to make ultimate choice regarding treatments. Patient would like input from [facility] on all issues mentioned. Patient asked for Belvia Spruce refill.  See alternate TE.

## 2022-06-13 NOTE — TELEPHONE ENCOUNTER
From: Terrance Garrison  To: RAD Conley  Sent: 6/12/2022 9:48 AM CDT  Subject: Random questions    Good morning   I have finished my course of prednisone and my kidney functioning is finally starting to really improve. Thank you for helping me go down to part-time status as I think that is also helping. I saw Dr Mercedez Frederick and while he agreed my cervical spine does look lousy, because I am not having radiating pain down my arms he does not feel that is the more immediate need. He plans on doing a bilateral occipital nerve block. Please feel free to argue the point of you feel otherwise! I surprisingly have started back up with the migraines and nausea/vomiting for the past few days. I want to be sure we are doing the intervention you guys think is best!   I am so disappointed as I had a really good run and was feeling so optimistic! Trying my prns but I have never had great results from them. And the Destinee Pate has only had a couple weeks on board. I am thinking that needs more time? I also assumed to stop the ajovy. Right? Tell me if you have any input. Hope you all had a great weekend!   Solitario Lassiter 0 = swallows foods/liquids without difficulty

## 2022-06-13 NOTE — TELEPHONE ENCOUNTER
Spoke to patient and told her to give the qulipta more time to work. If no better or worsening headaches then instructed to call. Explained to patient that ok with Dr. Bean Cherry plan to start with occipital nerve block and if not effective then consider cervical epidural steroid injection.

## 2022-06-14 NOTE — TELEPHONE ENCOUNTER
At this time decrease Humalog sliding scale to: 200-250 mg/dl = 2 units, 251-300 mg/dl = 4 units, 301-350 mg/dl = 6 units, > 350 mg/dl = 8 units tid w/meals since steroid therapy has completed. Her Metformin was discontinued d/t her acute kidney issue which is still being evaluated so at this time patient is to remain off Metformin. She is to continue her Trulicity 6.9AA injection weekly and pioglitazone 30mg daily as discussed. Please schedule patient for follow up around August 5th for repeat A1c and reevaluation.     Landry Gama APRN, BC-ADM, [facility]

## 2022-06-14 NOTE — TELEPHONE ENCOUNTER
From: Sterling Dangelo  To: VIOLET Rodriguez  Sent: 6/13/2022 5:20 PM CDT  Subject: Metformin    Prior to my whole migraine /kidney problem I was on metformin. I will keep monitoring my sugars but they won't reflect the same original pattern without it. Correct? If I have to go on insulin I do but of course I would prefer not to especially since I was relatively stable on oral meds.    Please advise  López Figueroa

## 2022-06-15 ENCOUNTER — TELEPHONE (OUTPATIENT)
Dept: NEUROLOGY | Facility: CLINIC | Age: 57
End: 2022-06-15

## 2022-06-15 NOTE — TELEPHONE ENCOUNTER
PA requested for Zolmitriptan nasal spray  Clinical questions answered and submitted to insurance  Awaiting coverage determination

## 2022-06-17 ENCOUNTER — PATIENT MESSAGE (OUTPATIENT)
Dept: ENDOCRINOLOGY CLINIC | Facility: CLINIC | Age: 57
End: 2022-06-17

## 2022-06-17 DIAGNOSIS — E78.2 MIXED HYPERLIPIDEMIA: Primary | ICD-10-CM

## 2022-06-20 RX ORDER — ROSUVASTATIN CALCIUM 20 MG/1
TABLET, COATED ORAL
Qty: 90 TABLET | Refills: 0 | Status: SHIPPED | OUTPATIENT
Start: 2022-06-20

## 2022-06-21 ENCOUNTER — TELEPHONE (OUTPATIENT)
Dept: FAMILY MEDICINE CLINIC | Facility: CLINIC | Age: 57
End: 2022-06-21

## 2022-06-21 ENCOUNTER — OFFICE VISIT (OUTPATIENT)
Dept: FAMILY MEDICINE CLINIC | Facility: CLINIC | Age: 57
End: 2022-06-21
Payer: COMMERCIAL

## 2022-06-21 ENCOUNTER — LAB ENCOUNTER (OUTPATIENT)
Dept: LAB | Age: 57
End: 2022-06-21
Attending: INTERNAL MEDICINE
Payer: COMMERCIAL

## 2022-06-21 VITALS
HEART RATE: 95 BPM | SYSTOLIC BLOOD PRESSURE: 114 MMHG | BODY MASS INDEX: 27.49 KG/M2 | TEMPERATURE: 98 F | OXYGEN SATURATION: 98 % | HEIGHT: 65 IN | WEIGHT: 165 LBS | DIASTOLIC BLOOD PRESSURE: 68 MMHG

## 2022-06-21 DIAGNOSIS — N17.9 AKI (ACUTE KIDNEY INJURY) (HCC): ICD-10-CM

## 2022-06-21 DIAGNOSIS — J06.9 VIRAL URI WITH COUGH: Primary | ICD-10-CM

## 2022-06-21 DIAGNOSIS — Z20.822 SUSPECTED COVID-19 VIRUS INFECTION: ICD-10-CM

## 2022-06-21 LAB
ANION GAP SERPL CALC-SCNC: 7 MMOL/L (ref 0–18)
BUN BLD-MCNC: 16 MG/DL (ref 7–18)
CALCIUM BLD-MCNC: 10 MG/DL (ref 8.5–10.1)
CHLORIDE SERPL-SCNC: 107 MMOL/L (ref 98–112)
CO2 SERPL-SCNC: 23 MMOL/L (ref 21–32)
CREAT BLD-MCNC: 1.29 MG/DL
FASTING STATUS PATIENT QL REPORTED: NO
GLUCOSE BLD-MCNC: 217 MG/DL (ref 70–99)
MAGNESIUM SERPL-MCNC: 2.3 MG/DL (ref 1.6–2.6)
OSMOLALITY SERPL CALC.SUM OF ELEC: 292 MOSM/KG (ref 275–295)
PHOSPHATE SERPL-MCNC: 4.2 MG/DL (ref 2.5–4.9)
POTASSIUM SERPL-SCNC: 4.1 MMOL/L (ref 3.5–5.1)
SODIUM SERPL-SCNC: 137 MMOL/L (ref 136–145)

## 2022-06-21 PROCEDURE — 83735 ASSAY OF MAGNESIUM: CPT

## 2022-06-21 PROCEDURE — 84100 ASSAY OF PHOSPHORUS: CPT

## 2022-06-21 PROCEDURE — 80048 BASIC METABOLIC PNL TOTAL CA: CPT

## 2022-06-21 RX ORDER — ALBUTEROL SULFATE 90 UG/1
1-2 AEROSOL, METERED RESPIRATORY (INHALATION) EVERY 4 HOURS PRN
Qty: 1 EACH | Refills: 0 | Status: SHIPPED | OUTPATIENT
Start: 2022-06-21

## 2022-06-21 NOTE — TELEPHONE ENCOUNTER
From: Kamaljit Gamez  To: VIOLET Bearden  Sent: 6/13/2022 5:20 PM CDT  Subject: Metformin    Prior to my whole migraine /kidney problem I was on metformin. I will keep monitoring my sugars but they won't reflect the same original pattern without it. Correct? If I have to go on insulin I do but of course I would prefer not to especially since I was relatively stable on oral meds.    Please advise  Ann Temple

## 2022-06-21 NOTE — TELEPHONE ENCOUNTER
Contacted patient to discuss regimen. Patient continues to be on steroid therapy. Her migraines have returned for several days. Patient to continue Trulicity 6.6EP injection weekly, pioglitazone 30mg daily, and Humalog sliding scale: 200-250 mg/dl = 2 units 251-300 mg/dl = 4 units, 301-350 mg/dl = 6 units, > 350 mg/dl = 8 units tid w/meals at this time. Patient verbalizes understanding. Will follow up as scheduled on 8/8/2022.     Dwayne Abrams APRN, BC-ADM, [facility]

## 2022-06-21 NOTE — TELEPHONE ENCOUNTER
Pt called and scheduled annual physical for 8/4/22. Pt questions if pap is needed is that something Dr Romie Hannah would recommenced pt get done with a OBGYN or should it be done in office? Pt states she has a condition that Dr Romie Hannah is aware of. Pt requests response via [employer].

## 2022-06-22 LAB — SARS-COV-2 RNA RESP QL NAA+PROBE: NOT DETECTED

## 2022-06-23 ENCOUNTER — PATIENT MESSAGE (OUTPATIENT)
Dept: NEUROLOGY | Facility: CLINIC | Age: 57
End: 2022-06-23

## 2022-06-23 NOTE — TELEPHONE ENCOUNTER
From: Kishore Templeton  To: RAD Swan  Sent: 6/23/2022 9:37 AM CDT  Subject: MIgraines    Well unfortunately I am back to daily migraines. Don't know if the prednisone was helping keep them at [place] [address], but back to square one. I took Relpax on Sunday and then again on Tuesday. I ended up going to urgent care Tuesday because the headache was so bad and I had had a cough. My COVID test was negative and lungs were clear. So that is good. The repeat dose Tuesday was effective but back again on Wednesday. Pain is waking me up in the middle of the night. 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 With a DJ and a cigar bar, I figure I am doomed. Any ideas?  [facility]

## 2022-06-28 PROBLEM — K59.00 CONSTIPATION: Status: ACTIVE | Noted: 2022-06-28

## 2022-06-28 PROBLEM — R11.0 NAUSEA: Status: ACTIVE | Noted: 2022-06-28

## 2022-06-29 ENCOUNTER — HOSPITAL ENCOUNTER (OUTPATIENT)
Facility: HOSPITAL | Age: 57
Setting detail: HOSPITAL OUTPATIENT SURGERY
Discharge: HOME | End: 2022-06-29
Attending: ANESTHESIOLOGY | Admitting: ANESTHESIOLOGY
Payer: COMMERCIAL

## 2022-06-29 VITALS
HEART RATE: 76 BPM | RESPIRATION RATE: 12 BRPM | OXYGEN SATURATION: 99 % | TEMPERATURE: 98 F | SYSTOLIC BLOOD PRESSURE: 123 MMHG | DIASTOLIC BLOOD PRESSURE: 82 MMHG

## 2022-06-29 DIAGNOSIS — M54.81 BILATERAL OCCIPITAL NEURALGIA: ICD-10-CM

## 2022-06-29 LAB — GLUCOSE BLD-MCNC: 172 MG/DL (ref 70–99)

## 2022-06-29 PROCEDURE — 3E0T33Z INTRODUCTION OF ANTI-INFLAMMATORY INTO PERIPHERAL NERVES AND PLEXI, PERCUTANEOUS APPROACH: ICD-10-PCS | Performed by: ANESTHESIOLOGY

## 2022-06-29 PROCEDURE — 3E0T3BZ INTRODUCTION OF ANESTHETIC AGENT INTO PERIPHERAL NERVES AND PLEXI, PERCUTANEOUS APPROACH: ICD-10-PCS | Performed by: ANESTHESIOLOGY

## 2022-06-29 PROCEDURE — 64405 NJX AA&/STRD GR OCPL NRV: CPT | Performed by: ANESTHESIOLOGY

## 2022-06-29 PROCEDURE — 3074F SYST BP LT 130 MM HG: CPT | Performed by: ANESTHESIOLOGY

## 2022-06-29 PROCEDURE — 3079F DIAST BP 80-89 MM HG: CPT | Performed by: ANESTHESIOLOGY

## 2022-06-29 RX ORDER — ONDANSETRON 2 MG/ML
4 INJECTION INTRAMUSCULAR; INTRAVENOUS ONCE AS NEEDED
Status: DISCONTINUED | OUTPATIENT
Start: 2022-06-29 | End: 2022-06-29

## 2022-06-29 RX ORDER — DIPHENHYDRAMINE HYDROCHLORIDE 50 MG/ML
50 INJECTION INTRAMUSCULAR; INTRAVENOUS ONCE AS NEEDED
Status: DISCONTINUED | OUTPATIENT
Start: 2022-06-29 | End: 2022-06-29

## 2022-06-29 RX ORDER — LIDOCAINE HYDROCHLORIDE 10 MG/ML
INJECTION, SOLUTION EPIDURAL; INFILTRATION; INTRACAUDAL; PERINEURAL
Status: DISCONTINUED | OUTPATIENT
Start: 2022-06-29 | End: 2022-06-29

## 2022-06-29 RX ORDER — NICOTINE POLACRILEX 4 MG
15 LOZENGE BUCCAL
Status: DISCONTINUED | OUTPATIENT
Start: 2022-06-29 | End: 2022-06-29

## 2022-06-29 RX ORDER — INSULIN ASPART 100 [IU]/ML
3 INJECTION, SOLUTION INTRAVENOUS; SUBCUTANEOUS ONCE
Status: DISCONTINUED | OUTPATIENT
Start: 2022-06-29 | End: 2022-06-29

## 2022-06-29 RX ORDER — METHYLPREDNISOLONE ACETATE 40 MG/ML
INJECTION, SUSPENSION INTRA-ARTICULAR; INTRALESIONAL; INTRAMUSCULAR; SOFT TISSUE
Status: DISCONTINUED | OUTPATIENT
Start: 2022-06-29 | End: 2022-06-29

## 2022-06-29 RX ORDER — DEXTROSE MONOHYDRATE 25 G/50ML
50 INJECTION, SOLUTION INTRAVENOUS
Status: DISCONTINUED | OUTPATIENT
Start: 2022-06-29 | End: 2022-06-29

## 2022-06-29 RX ORDER — SODIUM CHLORIDE, SODIUM LACTATE, POTASSIUM CHLORIDE, CALCIUM CHLORIDE 600; 310; 30; 20 MG/100ML; MG/100ML; MG/100ML; MG/100ML
100 INJECTION, SOLUTION INTRAVENOUS CONTINUOUS
Status: DISCONTINUED | OUTPATIENT
Start: 2022-06-29 | End: 2022-06-29

## 2022-06-29 RX ORDER — MIDAZOLAM HYDROCHLORIDE 1 MG/ML
INJECTION INTRAMUSCULAR; INTRAVENOUS
Status: DISCONTINUED | OUTPATIENT
Start: 2022-06-29 | End: 2022-06-29

## 2022-06-29 RX ORDER — NICOTINE POLACRILEX 4 MG
30 LOZENGE BUCCAL
Status: DISCONTINUED | OUTPATIENT
Start: 2022-06-29 | End: 2022-06-29

## 2022-06-29 NOTE — OPERATIVE REPORT
[facility]  Operative Report  2022     Lynette Art Patient Status:  Hospital Outpatient Surgery    1965 MRN [id number]   Location [address] [address] Attending No att. providers found   Hosp Day # 0 PCP Coleman Cohen DO     Indication: Chago Bettencourt is a 61year old female patient with chronic occipital headache was here for bilateral greater occipital nerve block. Preoperative Diagnosis:  Bilateral occipital neuralgia [M54.81]    Postoperative Diagnosis: Same as above. Procedure performed: Bilateral greater occipital nerve block    Anesthesia: Local and IV Sedation. EBL: Less than 1 ml. Procedure Description:   After reviewing the patient's history and performing a focused physical examination, the diagnosis was confirmed and contraindications such as infection and coagulopathy were ruled out. Following review of potential side effects and complications, including but not necessarily limited to infection, allergic reaction, local tissue breakdown, nerve injury, and paresis, the patient indicated they understood and agreed to proceed. After obtaining the informed consent, the patient was brought to the procedure room and monitored. Per my order and under my supervision, the patient was sedated with intermittent intravenous doses of versed and fentanyl. The vital signs were monitored and recorded by an experienced RN. The procedure started after the patient was adequately sedated. The moderate intravenous conscious sedation was provided for 9 minutes. The back of the head was prepped and draped. The greater occipital artery was palpated on both sides and marked. The greater and lesser occipital nerve blocks were performed medial and lateral to the arterial pulsation by using a 27-gauge 1.5 inch regular needle. The medication was consisted of 10 mg Depo-medrol mixed with 2 mL of 1% lidocaine on each side.   Another procedure was performed on the contralateral side in a similar approach. The patient tolerated the procedure very well. The patient was discharged in good condition to a responsible adult after discharge criteria were met. Complications: None. Follow up: The patient will be followed in the pain clinic as needed basis.     Alistair Shukla MD

## 2022-06-30 ENCOUNTER — LAB ENCOUNTER (OUTPATIENT)
Dept: LAB | Age: 57
End: 2022-06-30
Attending: PREVENTIVE MEDICINE
Payer: COMMERCIAL

## 2022-06-30 ENCOUNTER — TELEPHONE (OUTPATIENT)
Dept: INTERNAL MEDICINE CLINIC | Facility: HOSPITAL | Age: 57
End: 2022-06-30

## 2022-06-30 ENCOUNTER — PATIENT MESSAGE (OUTPATIENT)
Dept: ENDOCRINOLOGY CLINIC | Facility: CLINIC | Age: 57
End: 2022-06-30

## 2022-06-30 ENCOUNTER — PATIENT MESSAGE (OUTPATIENT)
Dept: NEUROLOGY | Facility: CLINIC | Age: 57
End: 2022-06-30

## 2022-06-30 DIAGNOSIS — G43.009 MIGRAINE WITHOUT AURA AND WITHOUT STATUS MIGRAINOSUS, NOT INTRACTABLE: ICD-10-CM

## 2022-06-30 DIAGNOSIS — E11.22 TYPE 2 DIABETES MELLITUS WITH STAGE 3A CHRONIC KIDNEY DISEASE, WITHOUT LONG-TERM CURRENT USE OF INSULIN (HCC): Primary | ICD-10-CM

## 2022-06-30 DIAGNOSIS — Z20.822 SUSPECTED 2019 NOVEL CORONAVIRUS INFECTION: ICD-10-CM

## 2022-06-30 DIAGNOSIS — N18.31 TYPE 2 DIABETES MELLITUS WITH STAGE 3A CHRONIC KIDNEY DISEASE, WITHOUT LONG-TERM CURRENT USE OF INSULIN (HCC): Primary | ICD-10-CM

## 2022-06-30 DIAGNOSIS — N18.4 TYPE 2 DIABETES MELLITUS WITH STAGE 4 CHRONIC KIDNEY DISEASE, WITHOUT LONG-TERM CURRENT USE OF INSULIN (HCC): ICD-10-CM

## 2022-06-30 DIAGNOSIS — E11.22 TYPE 2 DIABETES MELLITUS WITH STAGE 4 CHRONIC KIDNEY DISEASE, WITHOUT LONG-TERM CURRENT USE OF INSULIN (HCC): ICD-10-CM

## 2022-06-30 DIAGNOSIS — Z20.822 SUSPECTED 2019 NOVEL CORONAVIRUS INFECTION: Primary | ICD-10-CM

## 2022-06-30 LAB — SARS-COV-2 RNA RESP QL NAA+PROBE: NOT DETECTED

## 2022-06-30 RX ORDER — METFORMIN HYDROCHLORIDE 500 MG/1
500 TABLET, EXTENDED RELEASE ORAL
Qty: 30 TABLET | Refills: 1 | Status: SHIPPED | OUTPATIENT
Start: 2022-06-30

## 2022-06-30 NOTE — TELEPHONE ENCOUNTER
Medication: Jenny Jalloh     Date of last refill: 5/2422 (#30/0)  [place]     Last office visit: 5/17/22  Due back to clinic per last office note:  2 months  Date next office visit scheduled:    Future Appointments   Date Time Provider Praveena Steveni   7/6/2022  1:00 PM Cyndeemilagro Ish Recinos, [place] ENIPain EMG [place]   8/4/2022 10:00 AM Toribio Melchor,  EMG 36 [id number]   8/4/2022  2:00 PM Ed Castle MD ENIWARREN EMG [facility]   8/8/2022 12:30 PM VIOLET Vasques EMGDIABCTSPL EMG DIAB PLF   9/1/2022  9:00 AM [place]tilio [place] [address] [address] SUB GI   9/1/2022  9:30 AM Otilio Owen MD [address] [place] [address] SUB GI   9/15/2022 10:30 AM KYLIE Rueda SGINP ECC SUB GI           Last OV note recommendation:    Discussion plus Diagnostics & Treatment Orders:  1. The first is to do augmentation treatment with eletriptan to be augmented with Zofran and alternate Tylenol and if this does not work we can try as eletriptan with dexamethasone  2. Start Qulipta 10 mg daily as preventative  3. We will go for 3 months of Ajovy before we conclude that it is not helpful and if this is not helpful we can transition to Botox injection. 4.  Additional consideration would be a series of trigger point injections or nerve blocks in the neck and shoulder which are very tender at the moment. This neck pain could be a perpetuating factor for her headaches as well  5.   Keep appointment with Dr. Tani Sim

## 2022-06-30 NOTE — TELEPHONE ENCOUNTER
Patient may start Metformin ER 500mg with dinner at this time. Continue Trulicity 5.3BY injection weekly, pioglitazone 30mg daily and current Humalog sliding scale as needed if glucose over 200 mg/dl. Patient to remain on medication regimen until follow up scheduled on 8/8/2022. Metformin prescription sent to local pharmacy.     Lucie ESPINAL, BC-ADM, CDCES No

## 2022-06-30 NOTE — TELEPHONE ENCOUNTER
From: Cindy Lindsay  To: VIOLET Aguilar  Sent: 6/30/2022 12:32 PM CDT  Subject: Metformin    I am still interested in restarting my metformin. I have been out of town and monitoring my sugars with medium consistency. This sliding scale is difficult if I need to snack. I have been happy with my sugars being under 200 99% time. Yesterday I got a steroid pain injection which I know is going to screw me up for a bit and unfortunately many people I know from the wedding are coming up COVID positive. So... we'll what happens next. But I definitely do not like the current pattern.  I do not like accu check QID.   6/24 0600 - 181   1600 - 170  6/25 0900 - 169  6/26 0930 - 184   1700 - 198  6/27 1000 - 177   2100 - 181  6/28 0800 - 182   1600 - 161   2100- 198  6/29 0730 - 179    1800 - 229 2unit  6/30 0900 - 196   1230 - 244 2unit    Thanks, Maryam Mccoy

## 2022-06-30 NOTE — TELEPHONE ENCOUNTER
Spoke with pt and instructed her to start the metformin 500 mg at dinner and to continue current medication regimen and will discuss further during next appt on 8/8/22. Pt wanted to start taking 750 mg of metformin because she has three full bottles of 750 mg of metformin. I instructed her per [facility] to start back on the 500 mg of metformin and during her follow up in aug she will address the 750 mg of metformin. Pt gave verbal understanding.

## 2022-06-30 NOTE — TELEPHONE ENCOUNTER
From: Alesia Tyson  To: RAD Johansen  Sent: 6/30/2022 1:05 PM CDT  Subject: Parsons Mitts    can you order? He did not order a refill. It is through [employer] Thanks!

## 2022-06-30 NOTE — TELEPHONE ENCOUNTER
Spoke to patient and she just completed occipital nerve blocks and has started on nortriptyline 10mg daily. Recommended she give these treatments and medications some time to see if her headaches will improve. She expressed full understanding.

## 2022-07-01 ENCOUNTER — PATIENT MESSAGE (OUTPATIENT)
Dept: NEUROLOGY | Facility: CLINIC | Age: 57
End: 2022-07-01

## 2022-07-01 ENCOUNTER — LAB ENCOUNTER (OUTPATIENT)
Dept: LAB | Age: 57
End: 2022-07-01
Attending: PREVENTIVE MEDICINE
Payer: COMMERCIAL

## 2022-07-01 ENCOUNTER — PATIENT MESSAGE (OUTPATIENT)
Dept: ENDOCRINOLOGY CLINIC | Facility: CLINIC | Age: 57
End: 2022-07-01

## 2022-07-01 DIAGNOSIS — Z20.822 SUSPECTED 2019 NOVEL CORONAVIRUS INFECTION: ICD-10-CM

## 2022-07-01 DIAGNOSIS — N18.4 TYPE 2 DIABETES MELLITUS WITH STAGE 4 CHRONIC KIDNEY DISEASE, WITHOUT LONG-TERM CURRENT USE OF INSULIN (HCC): ICD-10-CM

## 2022-07-01 DIAGNOSIS — E11.22 TYPE 2 DIABETES MELLITUS WITH STAGE 4 CHRONIC KIDNEY DISEASE, WITHOUT LONG-TERM CURRENT USE OF INSULIN (HCC): ICD-10-CM

## 2022-07-01 RX ORDER — INSULIN LISPRO 100 [IU]/ML
INJECTION, SOLUTION INTRAVENOUS; SUBCUTANEOUS
Qty: 3 ML | Refills: 0 | COMMUNITY
Start: 2022-07-01

## 2022-07-01 RX ORDER — ATOGEPANT 10 MG/1
10 TABLET ORAL DAILY
Qty: 30 TABLET | Refills: 1 | Status: SHIPPED | OUTPATIENT
Start: 2022-07-01

## 2022-07-01 RX ORDER — INSULIN LISPRO 100 [IU]/ML
INJECTION, SOLUTION INTRAVENOUS; SUBCUTANEOUS
Qty: 15 ML | Refills: 0 | Status: CANCELLED | OUTPATIENT
Start: 2022-07-01

## 2022-07-01 RX ORDER — ATOGEPANT 10 MG/1
TABLET ORAL
Qty: 30 TABLET | Refills: 0 | OUTPATIENT
Start: 2022-07-01

## 2022-07-01 NOTE — TELEPHONE ENCOUNTER
Future Appointments   Date Time Provider Praveena Janet   7/6/2022  1:00 PM Alyssa Zheng ENIPain EMG [place]   8/4/2022 10:00 AM Magui Melchor DO EMG 36 [id number]   8/4/2022  2:00 PM Agustin Kirkpatrick MD [facility] EMG Christos   8/8/2022 12:30 PM VIOLET Osorio EMGDIABCTSPL EMG DIAB PLF   9/1/2022  9:00 AM Jada [place] [address] [address] ECC SUB GI   9/1/2022  9:30 AM [place]ada [place] [address] [address] ECC SUB GI   9/15/2022 10:30 AM Mendel Dearth, APRN SGINP ECC SUB GI     Last A1c value was 6.8% done 5/5/2022.

## 2022-07-02 LAB — SARS-COV-2 RNA RESP QL NAA+PROBE: NOT DETECTED

## 2022-07-05 NOTE — TELEPHONE ENCOUNTER
From: Romel Dale  To: RAD Larkin  Sent: 6/30/2022 1:05 PM CDT  Subject: Seb Anderson    can you order? He did not order a refill. It is through [employer] Thanks!

## 2022-07-06 ENCOUNTER — OFFICE VISIT (OUTPATIENT)
Dept: PAIN CLINIC | Facility: CLINIC | Age: 57
End: 2022-07-06
Payer: COMMERCIAL

## 2022-07-06 VITALS — HEART RATE: 93 BPM | OXYGEN SATURATION: 98 % | SYSTOLIC BLOOD PRESSURE: 100 MMHG | DIASTOLIC BLOOD PRESSURE: 66 MMHG

## 2022-07-06 DIAGNOSIS — M54.81 BILATERAL OCCIPITAL NEURALGIA: Primary | ICD-10-CM

## 2022-07-06 PROCEDURE — 3078F DIAST BP <80 MM HG: CPT | Performed by: PHYSICIAN ASSISTANT

## 2022-07-06 PROCEDURE — 3074F SYST BP LT 130 MM HG: CPT | Performed by: PHYSICIAN ASSISTANT

## 2022-07-06 PROCEDURE — 99214 OFFICE O/P EST MOD 30 MIN: CPT | Performed by: PHYSICIAN ASSISTANT

## 2022-07-06 NOTE — PROGRESS NOTES
Last procedure: BILATERAL GREATER OCCIPITAL NERVE BLOCK WITH INTRAVENOUS SEDATION  Date: 6/29/22  Percentage of relief obtained: pt unsure, had 2 migraines this week vs. 4 last week  Duration of relief: current    Current Pain Score: 6/10

## 2022-07-11 ENCOUNTER — TELEPHONE (OUTPATIENT)
Dept: NEUROLOGY | Facility: CLINIC | Age: 57
End: 2022-07-11

## 2022-07-11 NOTE — TELEPHONE ENCOUNTER
Prior authorization request completed for: Bilateral occipital NB  Authorization # NO PRIOR AUTHORIZATION REQUIRED  Authorization dates: N/A  CPT codes approved: 36319  Number of visits/dates of service approved: N/A  Physician: Dr. Zuleima Merrill   Location: [facility]     Call Ref#: [id number]  Representative Name: Heather Johnson     Patient can be scheduled. Routed to Navigator.

## 2022-07-13 NOTE — TELEPHONE ENCOUNTER
Pt states she feels like the injections did not work as much as she expected because she is still having migraines. She wants to know should she repeat injections or hold off on them?

## 2022-07-13 NOTE — TELEPHONE ENCOUNTER
If she had some amount of improvement, we would be comfortable repeating it, with the hope being that repeated injection produces progressive relief. However, if she had no relief, then would not suggest repeat procedure.

## 2022-07-14 RX ORDER — METHYLPREDNISOLONE 4 MG/1
TABLET ORAL
Qty: 1 EACH | Refills: 0 | Status: SHIPPED | OUTPATIENT
Start: 2022-07-14

## 2022-07-19 ENCOUNTER — HOSPITAL ENCOUNTER (EMERGENCY)
Facility: HOSPITAL | Age: 57
Discharge: HOME | End: 2022-07-19
Attending: EMERGENCY MEDICINE
Payer: OTHER MISCELLANEOUS

## 2022-07-19 ENCOUNTER — APPOINTMENT (OUTPATIENT)
Dept: CT IMAGING | Facility: HOSPITAL | Age: 57
End: 2022-07-19
Attending: EMERGENCY MEDICINE
Payer: OTHER MISCELLANEOUS

## 2022-07-19 VITALS
HEART RATE: 81 BPM | RESPIRATION RATE: 16 BRPM | WEIGHT: 165 LBS | HEIGHT: 65 IN | SYSTOLIC BLOOD PRESSURE: 138 MMHG | BODY MASS INDEX: 27.49 KG/M2 | OXYGEN SATURATION: 98 % | TEMPERATURE: 97 F | DIASTOLIC BLOOD PRESSURE: 76 MMHG

## 2022-07-19 DIAGNOSIS — S00.83XA TRAUMATIC HEMATOMA OF FOREHEAD, INITIAL ENCOUNTER: Primary | ICD-10-CM

## 2022-07-19 PROCEDURE — 70450 CT HEAD/BRAIN W/O DYE: CPT | Performed by: EMERGENCY MEDICINE

## 2022-07-19 PROCEDURE — 99284 EMERGENCY DEPT VISIT MOD MDM: CPT

## 2022-07-19 RX ORDER — HYDROCODONE BITARTRATE AND ACETAMINOPHEN 5; 325 MG/1; MG/1
1 TABLET ORAL ONCE
Status: COMPLETED | OUTPATIENT
Start: 2022-07-19 | End: 2022-07-19

## 2022-07-20 ENCOUNTER — OFFICE VISIT (OUTPATIENT)
Dept: OTHER | Facility: HOSPITAL | Age: 57
End: 2022-07-20
Attending: PREVENTIVE MEDICINE

## 2022-07-20 ENCOUNTER — PATIENT MESSAGE (OUTPATIENT)
Dept: NEUROLOGY | Facility: CLINIC | Age: 57
End: 2022-07-20

## 2022-07-20 DIAGNOSIS — S00.11XD: Primary | ICD-10-CM

## 2022-07-20 RX ORDER — ATOGEPANT 30 MG/1
TABLET ORAL
Qty: 30 TABLET | Refills: 3 | Status: SHIPPED | OUTPATIENT
Start: 2022-07-20

## 2022-07-20 RX ORDER — HYDROCODONE BITARTRATE AND ACETAMINOPHEN 5; 325 MG/1; MG/1
TABLET ORAL
Qty: 12 TABLET | Refills: 0 | Status: SHIPPED | OUTPATIENT
Start: 2022-07-20

## 2022-07-20 NOTE — TELEPHONE ENCOUNTER
Can increase the qulipta to 30 mg daily for headache prevention. Regarding work we can have her try to go back and give her intermittent leave and see how she does.

## 2022-07-26 ENCOUNTER — APPOINTMENT (OUTPATIENT)
Dept: OTHER | Facility: HOSPITAL | Age: 57
End: 2022-07-26
Attending: PREVENTIVE MEDICINE

## 2022-07-27 ENCOUNTER — PATIENT MESSAGE (OUTPATIENT)
Dept: FAMILY MEDICINE CLINIC | Facility: CLINIC | Age: 57
End: 2022-07-27

## 2022-07-27 ENCOUNTER — PATIENT OUTREACH (OUTPATIENT)
Dept: FAMILY MEDICINE CLINIC | Facility: CLINIC | Age: 57
End: 2022-07-27

## 2022-07-27 NOTE — PROGRESS NOTES
Outreached made to the pt re reminder to schedule colonoscopy and eye exam.  Referrals are already authorized.

## 2022-07-27 NOTE — TELEPHONE ENCOUNTER
From: Lynette Art  Sent: 7/27/2022 12:32 PM CDT  To: Dottie Sierra Nurse  Subject: Follow-up    hello ladyefri. Eliz Wardfranc has said that I can move up my appt with you guys to this week if you are able. My headaches continue as per my pattern anyways, but they think they may be more frequent and more severe because of the head trauma. I personally think that this is one of my bad stretches like I have had. I am so miserable today that I can hardly think straight. I have taken rescue meds for the past 3 days straight as well as as many last week. I don't know how I am going to make it through today. I don't even know what going in to see you would help. I will just be saying exactly what I am saying. I know I have to wait to see if upping the Charissa Ruchi will help. My psychiatrist also upped my nortriptyline to 25mg. I'll take your feedback. I also may have to take day four of meds. I will try some rest and zofran first.  Thanks.  Blanca Roberson

## 2022-07-28 ENCOUNTER — OFFICE VISIT (OUTPATIENT)
Dept: PAIN CLINIC | Facility: CLINIC | Age: 57
End: 2022-07-28
Payer: COMMERCIAL

## 2022-07-28 ENCOUNTER — TELEPHONE (OUTPATIENT)
Dept: NEUROLOGY | Facility: CLINIC | Age: 57
End: 2022-07-28

## 2022-07-28 VITALS
HEART RATE: 102 BPM | DIASTOLIC BLOOD PRESSURE: 62 MMHG | WEIGHT: 165 LBS | SYSTOLIC BLOOD PRESSURE: 98 MMHG | BODY MASS INDEX: 27 KG/M2 | OXYGEN SATURATION: 97 %

## 2022-07-28 DIAGNOSIS — M47.812 CERVICAL SPONDYLOSIS: Primary | ICD-10-CM

## 2022-07-28 PROCEDURE — 99214 OFFICE O/P EST MOD 30 MIN: CPT | Performed by: PHYSICIAN ASSISTANT

## 2022-07-28 PROCEDURE — 3078F DIAST BP <80 MM HG: CPT | Performed by: PHYSICIAN ASSISTANT

## 2022-07-28 PROCEDURE — 3074F SYST BP LT 130 MM HG: CPT | Performed by: PHYSICIAN ASSISTANT

## 2022-07-28 NOTE — TELEPHONE ENCOUNTER
Prior authorization request completed for: Right C3-C4, C4-C5, C5-C6 facet injections  Authorization # NO PRIOR AUTHORIZATION REQUIRED  Authorization dates: N/A  CPT codes approved: 47845 / 04451 / 16613  Number of visits/dates of service approved: 1  Physician: Dr. Keisha Garcia   Location: [facility]     Call Ref#: [id number] [phone]  Representative Name: Moira Bloom     Patient can be scheduled. Routed to Navigator.

## 2022-07-28 NOTE — TELEPHONE ENCOUNTER
From: Cassie BRUCE  To: Jessica Grossmanshirley  Sent: 7/27/2022 9:56 AM CDT  Subject: colon cancer screening and diabetic eye exam    Good morning Ms. Frances Pascal,    This is a reminder e-mail to call and schedule your colonoscopy and diabetic eye exam.  Referrals were authorized and approved in March for the Diabetic eye exam and June for the colon cancer screening. Please contact Dr. Gigi Guallpa office if you have any questions.   We can be reached at [phone] Monday -Friday from 8:30 am to Chris Lozano

## 2022-07-28 NOTE — PROGRESS NOTES
Patient presents in office today with reported pain in occipital neuralgia    Current pain level reported = 6/10    Last reported dose of none      Narcotic Contract renewal none    Urine Drug screen none

## 2022-07-28 NOTE — TELEPHONE ENCOUNTER
Prior authorization request completed for: Left C3-C4, C4-C5, C5-C6 facet injections  Authorization # NO PRIOR AUTHORIZATION REQUIRED  Authorization dates: N/A  CPT codes approved: 60774 / 53720 / 52974  Number of visits/dates of service approved: 1  Physician: Dr. Godfrey Webb   [facility]  Call Ref#: Ariella Lowe  Representative Name: Juan Palomares  Patient can be scheduled. Routed to Navigator.

## 2022-07-29 ENCOUNTER — LAB ENCOUNTER (OUTPATIENT)
Dept: LAB | Facility: HOSPITAL | Age: 57
End: 2022-07-29
Attending: PREVENTIVE MEDICINE
Payer: COMMERCIAL

## 2022-07-29 ENCOUNTER — APPOINTMENT (OUTPATIENT)
Dept: OTHER | Facility: HOSPITAL | Age: 57
End: 2022-07-29
Attending: PREVENTIVE MEDICINE
Payer: COMMERCIAL

## 2022-07-29 DIAGNOSIS — N18.31 TYPE 2 DIABETES MELLITUS WITH STAGE 3A CHRONIC KIDNEY DISEASE, WITHOUT LONG-TERM CURRENT USE OF INSULIN (HCC): ICD-10-CM

## 2022-07-29 DIAGNOSIS — E11.22 TYPE 2 DIABETES MELLITUS WITH STAGE 3A CHRONIC KIDNEY DISEASE, WITHOUT LONG-TERM CURRENT USE OF INSULIN (HCC): ICD-10-CM

## 2022-07-29 LAB
ANION GAP SERPL CALC-SCNC: 5 MMOL/L (ref 0–18)
BUN BLD-MCNC: 20 MG/DL (ref 7–18)
CALCIUM BLD-MCNC: 10.4 MG/DL (ref 8.5–10.1)
CHLORIDE SERPL-SCNC: 105 MMOL/L (ref 98–112)
CO2 SERPL-SCNC: 26 MMOL/L (ref 21–32)
CREAT BLD-MCNC: 1.33 MG/DL
EST. AVERAGE GLUCOSE BLD GHB EST-MCNC: 186 MG/DL (ref 68–126)
FASTING STATUS PATIENT QL REPORTED: NO
GLUCOSE BLD-MCNC: 168 MG/DL (ref 70–99)
HBA1C MFR BLD: 8.1 % (ref ?–5.7)
OSMOLALITY SERPL CALC.SUM OF ELEC: 288 MOSM/KG (ref 275–295)
POTASSIUM SERPL-SCNC: 4.5 MMOL/L (ref 3.5–5.1)
SODIUM SERPL-SCNC: 136 MMOL/L (ref 136–145)

## 2022-07-29 PROCEDURE — 80048 BASIC METABOLIC PNL TOTAL CA: CPT

## 2022-07-29 PROCEDURE — 83036 HEMOGLOBIN GLYCOSYLATED A1C: CPT

## 2022-07-29 PROCEDURE — 36415 COLL VENOUS BLD VENIPUNCTURE: CPT

## 2022-08-02 DIAGNOSIS — G43.009 MIGRAINE WITHOUT AURA AND WITHOUT STATUS MIGRAINOSUS, NOT INTRACTABLE: ICD-10-CM

## 2022-08-03 ENCOUNTER — PATIENT MESSAGE (OUTPATIENT)
Dept: ENDOCRINOLOGY CLINIC | Facility: CLINIC | Age: 57
End: 2022-08-03

## 2022-08-03 RX ORDER — ATOGEPANT 10 MG/1
TABLET ORAL
Qty: 30 TABLET | Refills: 1 | OUTPATIENT
Start: 2022-08-03

## 2022-08-04 ENCOUNTER — OFFICE VISIT (OUTPATIENT)
Dept: NEUROLOGY | Facility: CLINIC | Age: 57
End: 2022-08-04
Payer: OTHER MISCELLANEOUS

## 2022-08-04 ENCOUNTER — OFFICE VISIT (OUTPATIENT)
Dept: FAMILY MEDICINE CLINIC | Facility: CLINIC | Age: 57
End: 2022-08-04
Payer: COMMERCIAL

## 2022-08-04 ENCOUNTER — PATIENT MESSAGE (OUTPATIENT)
Dept: ENDOCRINOLOGY CLINIC | Facility: CLINIC | Age: 57
End: 2022-08-04

## 2022-08-04 VITALS
DIASTOLIC BLOOD PRESSURE: 70 MMHG | TEMPERATURE: 98 F | SYSTOLIC BLOOD PRESSURE: 100 MMHG | WEIGHT: 168 LBS | HEART RATE: 84 BPM | BODY MASS INDEX: 27.99 KG/M2 | HEIGHT: 65 IN | RESPIRATION RATE: 18 BRPM

## 2022-08-04 VITALS
SYSTOLIC BLOOD PRESSURE: 111 MMHG | BODY MASS INDEX: 27.99 KG/M2 | RESPIRATION RATE: 16 BRPM | HEIGHT: 65 IN | HEART RATE: 102 BPM | WEIGHT: 168 LBS | DIASTOLIC BLOOD PRESSURE: 69 MMHG

## 2022-08-04 DIAGNOSIS — Z12.4 PAP SMEAR FOR CERVICAL CANCER SCREENING: ICD-10-CM

## 2022-08-04 DIAGNOSIS — S00.10XA CONTUSION OF EYELID, UNSPECIFIED LATERALITY, INITIAL ENCOUNTER: ICD-10-CM

## 2022-08-04 DIAGNOSIS — N18.31 TYPE 2 DIABETES MELLITUS WITH STAGE 3A CHRONIC KIDNEY DISEASE, WITHOUT LONG-TERM CURRENT USE OF INSULIN (HCC): ICD-10-CM

## 2022-08-04 DIAGNOSIS — E78.2 MIXED HYPERLIPIDEMIA: ICD-10-CM

## 2022-08-04 DIAGNOSIS — G43.709 CHRONIC MIGRAINE W/O AURA W/O STATUS MIGRAINOSUS, NOT INTRACTABLE: Primary | ICD-10-CM

## 2022-08-04 DIAGNOSIS — E11.22 TYPE 2 DIABETES MELLITUS WITH STAGE 3A CHRONIC KIDNEY DISEASE, WITHOUT LONG-TERM CURRENT USE OF INSULIN (HCC): ICD-10-CM

## 2022-08-04 DIAGNOSIS — Z00.00 ROUTINE GENERAL MEDICAL EXAMINATION AT A HEALTH CARE FACILITY: Primary | ICD-10-CM

## 2022-08-04 PROCEDURE — 3074F SYST BP LT 130 MM HG: CPT | Performed by: FAMILY MEDICINE

## 2022-08-04 PROCEDURE — 3078F DIAST BP <80 MM HG: CPT | Performed by: FAMILY MEDICINE

## 2022-08-04 PROCEDURE — 3008F BODY MASS INDEX DOCD: CPT | Performed by: FAMILY MEDICINE

## 2022-08-04 PROCEDURE — 99396 PREV VISIT EST AGE 40-64: CPT | Performed by: FAMILY MEDICINE

## 2022-08-04 PROCEDURE — 3074F SYST BP LT 130 MM HG: CPT | Performed by: OTHER

## 2022-08-04 PROCEDURE — 3078F DIAST BP <80 MM HG: CPT | Performed by: OTHER

## 2022-08-04 PROCEDURE — 99214 OFFICE O/P EST MOD 30 MIN: CPT | Performed by: OTHER

## 2022-08-04 PROCEDURE — 3008F BODY MASS INDEX DOCD: CPT | Performed by: OTHER

## 2022-08-04 RX ORDER — ZOLMITRIPTAN 5 MG/1
SPRAY NASAL
Qty: 6 EACH | Refills: 11 | Status: SHIPPED | OUTPATIENT
Start: 2022-08-04

## 2022-08-04 RX ORDER — ROSUVASTATIN CALCIUM 20 MG/1
20 TABLET, COATED ORAL NIGHTLY
Qty: 90 TABLET | Refills: 1 | Status: SHIPPED | OUTPATIENT
Start: 2022-08-04

## 2022-08-04 RX ORDER — ELETRIPTAN HYDROBROMIDE 40 MG/1
TABLET, FILM COATED ORAL
Qty: 8 TABLET | Refills: 11 | Status: SHIPPED | OUTPATIENT
Start: 2022-08-04

## 2022-08-04 NOTE — TELEPHONE ENCOUNTER
From: Phan Aden  To: VIOLET Marcum  Sent: 8/3/2022 9:28 PM CDT  Subject: Metformin     I am out of this medicine. I do not want to renew without discussion.  should I wait until the appointment on the [facility]

## 2022-08-08 ENCOUNTER — OFFICE VISIT (OUTPATIENT)
Dept: ENDOCRINOLOGY CLINIC | Facility: CLINIC | Age: 57
End: 2022-08-08
Payer: COMMERCIAL

## 2022-08-08 ENCOUNTER — APPOINTMENT (OUTPATIENT)
Dept: OTHER | Facility: HOSPITAL | Age: 57
End: 2022-08-08
Attending: PREVENTIVE MEDICINE

## 2022-08-08 VITALS
RESPIRATION RATE: 16 BRPM | SYSTOLIC BLOOD PRESSURE: 114 MMHG | BODY MASS INDEX: 28.16 KG/M2 | WEIGHT: 169 LBS | HEART RATE: 89 BPM | DIASTOLIC BLOOD PRESSURE: 78 MMHG | HEIGHT: 65 IN

## 2022-08-08 DIAGNOSIS — E11.22 TYPE 2 DIABETES MELLITUS WITH STAGE 3B CHRONIC KIDNEY DISEASE, WITHOUT LONG-TERM CURRENT USE OF INSULIN (HCC): Primary | ICD-10-CM

## 2022-08-08 DIAGNOSIS — N18.32 TYPE 2 DIABETES MELLITUS WITH STAGE 3B CHRONIC KIDNEY DISEASE, WITHOUT LONG-TERM CURRENT USE OF INSULIN (HCC): Primary | ICD-10-CM

## 2022-08-08 DIAGNOSIS — E78.2 MIXED HYPERLIPIDEMIA: ICD-10-CM

## 2022-08-08 DIAGNOSIS — R80.9 MICROALBUMINURIA: ICD-10-CM

## 2022-08-08 DIAGNOSIS — I10 PRIMARY HYPERTENSION: ICD-10-CM

## 2022-08-08 PROCEDURE — 3074F SYST BP LT 130 MM HG: CPT | Performed by: NURSE PRACTITIONER

## 2022-08-08 PROCEDURE — 3078F DIAST BP <80 MM HG: CPT | Performed by: NURSE PRACTITIONER

## 2022-08-08 PROCEDURE — 99214 OFFICE O/P EST MOD 30 MIN: CPT | Performed by: NURSE PRACTITIONER

## 2022-08-08 PROCEDURE — 3008F BODY MASS INDEX DOCD: CPT | Performed by: NURSE PRACTITIONER

## 2022-08-08 RX ORDER — DULAGLUTIDE 4.5 MG/.5ML
4.5 INJECTION, SOLUTION SUBCUTANEOUS WEEKLY
Qty: 6 ML | Refills: 1 | Status: SHIPPED | OUTPATIENT
Start: 2022-08-08

## 2022-08-08 RX ORDER — METFORMIN HYDROCHLORIDE 750 MG/1
750 TABLET, EXTENDED RELEASE ORAL 2 TIMES DAILY WITH MEALS
Qty: 60 TABLET | Refills: 1 | Status: SHIPPED | OUTPATIENT
Start: 2022-08-08

## 2022-08-09 ENCOUNTER — TELEPHONE (OUTPATIENT)
Dept: NEUROLOGY | Facility: CLINIC | Age: 57
End: 2022-08-09

## 2022-08-09 ENCOUNTER — MED REC SCAN ONLY (OUTPATIENT)
Dept: FAMILY MEDICINE CLINIC | Facility: CLINIC | Age: 57
End: 2022-08-09

## 2022-08-09 NOTE — TELEPHONE ENCOUNTER
Faxed 8/5/22 ov notes for WC approval.  Pt unable to schedule 6 month f/u appt due to schedule not open in 2023.     Fax confirmed

## 2022-08-18 LAB — HPV I/H RISK 1 DNA SPEC QL NAA+PROBE: NEGATIVE

## 2022-08-30 ENCOUNTER — ORDER TRANSCRIPTION (OUTPATIENT)
Dept: ADMINISTRATIVE | Facility: HOSPITAL | Age: 57
End: 2022-08-30

## 2022-08-30 ENCOUNTER — LAB ENCOUNTER (OUTPATIENT)
Dept: LAB | Facility: HOSPITAL | Age: 57
End: 2022-08-30
Attending: INTERNAL MEDICINE
Payer: COMMERCIAL

## 2022-08-30 DIAGNOSIS — Z01.818 PRE-PROCEDURAL EXAMINATION: ICD-10-CM

## 2022-08-30 DIAGNOSIS — Z11.59 SCREENING FOR VIRAL DISEASE: ICD-10-CM

## 2022-08-30 DIAGNOSIS — Z01.818 PRE-OP TESTING: ICD-10-CM

## 2022-08-30 DIAGNOSIS — Z01.818 PRE-PROCEDURAL EXAMINATION: Primary | ICD-10-CM

## 2022-08-30 LAB — SARS-COV-2 RNA RESP QL NAA+PROBE: NOT DETECTED

## 2022-08-31 RX ORDER — BLOOD SUGAR DIAGNOSTIC
STRIP MISCELLANEOUS
Qty: 200 STRIP | Refills: 3 | OUTPATIENT
Start: 2022-08-31

## 2022-08-31 RX ORDER — BLOOD SUGAR DIAGNOSTIC
STRIP MISCELLANEOUS
Qty: 180 STRIP | Refills: 3 | Status: SHIPPED | OUTPATIENT
Start: 2022-08-31

## 2022-08-31 NOTE — TELEPHONE ENCOUNTER
LOV: 8/4/22  for: Physical  Patient advised to RTC on:  6 months  I don't see that we have ever prescribed. Pt see's Brayan Elizalde for diabetes.

## 2022-09-07 ENCOUNTER — TELEPHONE (OUTPATIENT)
Dept: ENDOCRINOLOGY CLINIC | Facility: CLINIC | Age: 57
End: 2022-09-07

## 2022-09-07 RX ORDER — LANCETS
EACH MISCELLANEOUS
Qty: 200 EACH | Refills: 11 | Status: SHIPPED | OUTPATIENT
Start: 2022-09-07

## 2022-09-07 RX ORDER — BLOOD-GLUCOSE METER
1 EACH MISCELLANEOUS DAILY
Qty: 1 EACH | Refills: 0 | Status: SHIPPED | OUTPATIENT
Start: 2022-09-07

## 2022-09-07 RX ORDER — BLOOD SUGAR DIAGNOSTIC
STRIP MISCELLANEOUS
Qty: 200 EACH | Refills: 11 | Status: SHIPPED | OUTPATIENT
Start: 2022-09-07

## 2022-09-08 ENCOUNTER — TELEPHONE (OUTPATIENT)
Dept: ENDOCRINOLOGY CLINIC | Facility: CLINIC | Age: 57
End: 2022-09-08

## 2022-09-08 ENCOUNTER — PATIENT MESSAGE (OUTPATIENT)
Dept: ENDOCRINOLOGY CLINIC | Facility: CLINIC | Age: 57
End: 2022-09-08

## 2022-09-09 ENCOUNTER — VIRTUAL PHONE E/M (OUTPATIENT)
Dept: ENDOCRINOLOGY CLINIC | Facility: CLINIC | Age: 57
End: 2022-09-09
Payer: COMMERCIAL

## 2022-09-09 DIAGNOSIS — R80.9 MICROALBUMINURIA: ICD-10-CM

## 2022-09-09 DIAGNOSIS — N18.32 TYPE 2 DIABETES MELLITUS WITH STAGE 3B CHRONIC KIDNEY DISEASE, WITHOUT LONG-TERM CURRENT USE OF INSULIN (HCC): Primary | ICD-10-CM

## 2022-09-09 DIAGNOSIS — E11.22 TYPE 2 DIABETES MELLITUS WITH STAGE 3B CHRONIC KIDNEY DISEASE, WITHOUT LONG-TERM CURRENT USE OF INSULIN (HCC): Primary | ICD-10-CM

## 2022-09-09 NOTE — TELEPHONE ENCOUNTER
Noted, will discuss during today's virtual visit.     Juani ESPINAL, BC-ADM, [facility]

## 2022-09-09 NOTE — TELEPHONE ENCOUNTER
From: Diego Rivers  Sent: 9/9/2022 7:27 AM CDT  To: Marvell Homans, APN  Subject: Blood suagr readings    ----- Message from Yumiko Shafer [place] sent at 9/9/2022 7:27 AM CDT -----       ----- Message from Fabyjulio césar Thurman to Juan Danpamela, [place] sent at 9/8/2022 9:42 PM -----   I have not done my sugars in a week as there was confusion with the Rx. I have not had a sugar above 200 and used insulin since I completed steroid treatments. 8/18 am 150 dinner 149  8/19 am 155 dinner 155  8/20 lunch 131  8/21 am 140 lunch 123 dinner 139   8/22 am 151  8/23 am 131 lunch 119   8/24 am 163 lunch 133  8/25 am 132 lunch 126 dinner 138  8/26 am 148  8/27 am 162 dinner 149   8/28 am 157  Basically I have been forgetting at times. Will chat tomorrow  Climmie Brighton       ----- Message -----   From:Sherri JAMES   Sent:9/8/2022 12:31 PM CDT   To:Vero Michel   Subject:Blood suagr readings    Hi Higinio Plan have a phone call appointment tomorrow at 12:30 pm with Marvell Homans. Please send in 10-14 days of blood sugar readings. If you have any questions please call the office at [phone].     Thank you   Eder Decent

## 2022-10-03 ENCOUNTER — PATIENT MESSAGE (OUTPATIENT)
Dept: ENDOCRINOLOGY CLINIC | Facility: CLINIC | Age: 57
End: 2022-10-03

## 2022-10-03 ENCOUNTER — PATIENT MESSAGE (OUTPATIENT)
Dept: FAMILY MEDICINE CLINIC | Facility: CLINIC | Age: 57
End: 2022-10-03

## 2022-10-03 ENCOUNTER — PATIENT MESSAGE (OUTPATIENT)
Dept: NEUROLOGY | Facility: CLINIC | Age: 57
End: 2022-10-03

## 2022-10-03 DIAGNOSIS — Z13.89 SCREENING FOR GENITOURINARY CONDITION: ICD-10-CM

## 2022-10-03 DIAGNOSIS — N18.32 TYPE 2 DIABETES MELLITUS WITH STAGE 3B CHRONIC KIDNEY DISEASE, WITHOUT LONG-TERM CURRENT USE OF INSULIN (HCC): ICD-10-CM

## 2022-10-03 DIAGNOSIS — Z00.00 LABORATORY EXAMINATION ORDERED AS PART OF A ROUTINE GENERAL MEDICAL EXAMINATION: Primary | ICD-10-CM

## 2022-10-03 DIAGNOSIS — E11.22 TYPE 2 DIABETES MELLITUS WITH STAGE 3B CHRONIC KIDNEY DISEASE, WITHOUT LONG-TERM CURRENT USE OF INSULIN (HCC): ICD-10-CM

## 2022-10-03 RX ORDER — ATOGEPANT 30 MG/1
TABLET ORAL
Qty: 90 TABLET | Refills: 1 | Status: SHIPPED | OUTPATIENT
Start: 2022-10-03

## 2022-10-03 NOTE — TELEPHONE ENCOUNTER
From: Terrance Garrison  To: Merry Wilder MD  Sent: 10/3/2022 11:59 AM CDT  Subject: [address] needs a renewal for the Qulipta 30mg. Is it possible to do a 90 day supply? I have been doing SO much better on this dose! Except this last week. Perhaps the weather change? But in general a remarkable improvement! Thank you to all!    Solitario Lassiter

## 2022-10-03 NOTE — TELEPHONE ENCOUNTER
Medication: Qulipta 30 mg     Date of last refill: 7/20/22  [place]     Last office visit: 8/4/22  Due back to clinic per last office note:  6 months  Date next office visit scheduled:    Future Appointments   Date Time Provider Praveena Gonzales   10/24/2022  4:45 PM Naren estrada,  [address] [place] [address]   11/4/2022 12:30 PM VIOLET Montes De Oca [facility]   2/2/2023 11:20 AM MD SB PortilloIWASIF [facility] [place]           Last OV note recommendation:    Per Dr. Cindi Smith:  Now that she just got better, we will just observe how the headaches will play out.   If the frequency increases, I'd like to see how another step up in Nortriptyline will help and if it does not, then plan the BOTOX injection

## 2022-10-03 NOTE — TELEPHONE ENCOUNTER
Pt currently on max dose farxiga. Okay to send 90 day script? LOV: 09/09/2022  Future Appointments   Date Time Provider Praveena Janet   10/24/2022  4:45 PM Naren Frazier [facility]   11/4/2022 12:30 PM VIOLET Montes De Oca [facility]   2/2/2023 11:20 AM MD ANA Portillo [facility]     Last A1c value was 8.1% done 7/29/2022.

## 2022-10-03 NOTE — TELEPHONE ENCOUNTER
From: Mary Alice Jackson  To: VIOLET Em  Sent: 10/3/2022 12:14 PM CDT  Subject: Janet Draper is asking for a 90 day supply order. Hard to say how my sugars are because I am having smaller more frequent meals. But I am between 120 and 150. An A1C would be a better assessment. . but I don't know what you guys want to do about the Rx. I see her in a month but I am out of it in a week. Would she want to go higher on the dose for a month before ordering a 90 day? I can tell you only once did I dip below 120 and I did not feel well. I will have to get used to sugars in normal range!

## 2022-10-04 ENCOUNTER — PATIENT MESSAGE (OUTPATIENT)
Dept: FAMILY MEDICINE CLINIC | Facility: CLINIC | Age: 57
End: 2022-10-04

## 2022-10-04 DIAGNOSIS — E11.65 UNCONTROLLED TYPE 2 DIABETES MELLITUS WITH HYPERGLYCEMIA (HCC): Primary | ICD-10-CM

## 2022-10-04 NOTE — TELEPHONE ENCOUNTER
From: Nelda Lerma  To: [address] [facility], DO  Sent: 10/3/2022 11:43 AM CDT  Subject: Need for appointment? When I had my last physical I did not have cholesterol levels checked. . nor did I have my form for the doctor to fill in for my [facility] rate reduction wellness rate. Should I make another appointment?    Saba Hill

## 2022-10-05 NOTE — TELEPHONE ENCOUNTER
From: Amberly Norwood  To: [address] [facility], DO  Sent: 10/3/2022 11:38 AM CDT  Subject: GI    As you may remember I did not complete my GI work up. I thought I had an allergic reaction to the Golytely. I know wonder if it was a popsicle I had that evening as I had one and my lips began to swell. . not as bad as the first time. I did ask the GI team if I could do another prep but they never gave me an answer. In the meantime I have been experiencing such uncomfortable constipation. Again GI doc said daily Miralax which made me miserable! I have been doing colace twice daily which is only mildly effective. Any other ideas?

## 2022-10-05 NOTE — TELEPHONE ENCOUNTER
Definitely still needs GI workup with colonoscopy. In the meantime, continue colace and try dulcolax instead of miralax.

## 2022-10-13 ENCOUNTER — LAB ENCOUNTER (OUTPATIENT)
Dept: LAB | Age: 57
End: 2022-10-13
Attending: FAMILY MEDICINE
Payer: COMMERCIAL

## 2022-10-13 ENCOUNTER — TELEPHONE (OUTPATIENT)
Dept: NEUROLOGY | Facility: CLINIC | Age: 57
End: 2022-10-13

## 2022-10-13 DIAGNOSIS — Z13.89 SCREENING FOR GENITOURINARY CONDITION: ICD-10-CM

## 2022-10-13 DIAGNOSIS — E11.22 TYPE 2 DIABETES MELLITUS WITH STAGE 3B CHRONIC KIDNEY DISEASE, WITHOUT LONG-TERM CURRENT USE OF INSULIN (HCC): ICD-10-CM

## 2022-10-13 DIAGNOSIS — N18.32 TYPE 2 DIABETES MELLITUS WITH STAGE 3B CHRONIC KIDNEY DISEASE, WITHOUT LONG-TERM CURRENT USE OF INSULIN (HCC): ICD-10-CM

## 2022-10-13 DIAGNOSIS — Z00.00 LABORATORY EXAMINATION ORDERED AS PART OF A ROUTINE GENERAL MEDICAL EXAMINATION: ICD-10-CM

## 2022-10-13 LAB
ALBUMIN SERPL-MCNC: 4 G/DL (ref 3.4–5)
ALBUMIN/GLOB SERPL: 1.2 {RATIO} (ref 1–2)
ALP LIVER SERPL-CCNC: 73 U/L
ALT SERPL-CCNC: 28 U/L
ANION GAP SERPL CALC-SCNC: 8 MMOL/L (ref 0–18)
AST SERPL-CCNC: 20 U/L (ref 15–37)
BASOPHILS # BLD AUTO: 0.07 X10(3) UL (ref 0–0.2)
BASOPHILS NFR BLD AUTO: 0.9 %
BILIRUB SERPL-MCNC: 0.5 MG/DL (ref 0.1–2)
BUN BLD-MCNC: 17 MG/DL (ref 7–18)
BUN/CREAT SERPL: 16 (ref 10–20)
CALCIUM BLD-MCNC: 9.8 MG/DL (ref 8.5–10.1)
CHLORIDE SERPL-SCNC: 107 MMOL/L (ref 98–112)
CHOLEST SERPL-MCNC: 84 MG/DL (ref ?–200)
CO2 SERPL-SCNC: 25 MMOL/L (ref 21–32)
CREAT BLD-MCNC: 1.06 MG/DL
DEPRECATED RDW RBC AUTO: 41.6 FL (ref 35.1–46.3)
EOSINOPHIL # BLD AUTO: 0.38 X10(3) UL (ref 0–0.7)
EOSINOPHIL NFR BLD AUTO: 5.1 %
ERYTHROCYTE [DISTWIDTH] IN BLOOD BY AUTOMATED COUNT: 12.2 % (ref 11–15)
EST. AVERAGE GLUCOSE BLD GHB EST-MCNC: 166 MG/DL (ref 68–126)
FASTING PATIENT LIPID ANSWER: YES
FASTING STATUS PATIENT QL REPORTED: YES
GFR SERPLBLD BASED ON 1.73 SQ M-ARVRAT: 61 ML/MIN/1.73M2 (ref 60–?)
GLOBULIN PLAS-MCNC: 3.4 G/DL (ref 2.8–4.4)
GLUCOSE BLD-MCNC: 147 MG/DL (ref 70–99)
HBA1C MFR BLD: 7.4 % (ref ?–5.7)
HCT VFR BLD AUTO: 45.1 %
HDLC SERPL-MCNC: 33 MG/DL (ref 40–59)
HGB BLD-MCNC: 14 G/DL
IMM GRANULOCYTES # BLD AUTO: 0.02 X10(3) UL (ref 0–1)
IMM GRANULOCYTES NFR BLD: 0.3 %
LDLC SERPL CALC-MCNC: 30 MG/DL (ref ?–100)
LYMPHOCYTES # BLD AUTO: 2.44 X10(3) UL (ref 1–4)
LYMPHOCYTES NFR BLD AUTO: 32.7 %
MCH RBC QN AUTO: 28.7 PG (ref 26–34)
MCHC RBC AUTO-ENTMCNC: 31 G/DL (ref 31–37)
MCV RBC AUTO: 92.6 FL
MONOCYTES # BLD AUTO: 0.63 X10(3) UL (ref 0.1–1)
MONOCYTES NFR BLD AUTO: 8.4 %
NEUTROPHILS # BLD AUTO: 3.92 X10 (3) UL (ref 1.5–7.7)
NEUTROPHILS # BLD AUTO: 3.92 X10(3) UL (ref 1.5–7.7)
NEUTROPHILS NFR BLD AUTO: 52.6 %
NONHDLC SERPL-MCNC: 51 MG/DL (ref ?–130)
OSMOLALITY SERPL CALC.SUM OF ELEC: 294 MOSM/KG (ref 275–295)
PLATELET # BLD AUTO: 304 10(3)UL (ref 150–450)
POTASSIUM SERPL-SCNC: 4.2 MMOL/L (ref 3.5–5.1)
PROT SERPL-MCNC: 7.4 G/DL (ref 6.4–8.2)
RBC # BLD AUTO: 4.87 X10(6)UL
SODIUM SERPL-SCNC: 140 MMOL/L (ref 136–145)
TRIGL SERPL-MCNC: 117 MG/DL (ref 30–149)
TSI SER-ACNC: 1.04 MIU/ML (ref 0.36–3.74)
VLDLC SERPL CALC-MCNC: 16 MG/DL (ref 0–30)
WBC # BLD AUTO: 7.5 X10(3) UL (ref 4–11)

## 2022-10-13 PROCEDURE — 80061 LIPID PANEL: CPT

## 2022-10-13 PROCEDURE — 83036 HEMOGLOBIN GLYCOSYLATED A1C: CPT

## 2022-10-13 PROCEDURE — 84443 ASSAY THYROID STIM HORMONE: CPT

## 2022-10-13 PROCEDURE — 85025 COMPLETE CBC W/AUTO DIFF WBC: CPT

## 2022-10-13 PROCEDURE — 80053 COMPREHEN METABOLIC PANEL: CPT

## 2022-10-13 NOTE — TELEPHONE ENCOUNTER
Pt came to office to  Qulipta 30mg samples.   Pt provided #8 box/4 tab each    Lot [id number]  Exp 11/30/23

## 2022-10-31 ENCOUNTER — TELEPHONE (OUTPATIENT)
Dept: NEUROLOGY | Facility: CLINIC | Age: 57
End: 2022-10-31

## 2022-11-02 ENCOUNTER — PATIENT MESSAGE (OUTPATIENT)
Dept: FAMILY MEDICINE CLINIC | Facility: CLINIC | Age: 57
End: 2022-11-02

## 2022-11-02 NOTE — TELEPHONE ENCOUNTER
From: Gagan Buckley  To: Michael RIVERA DO  Sent: 11/2/2022 1:00 PM CDT  Subject: Follow up     I need a form filled out by my PCP for insurance. I did not bring it to my physical. I also hadn't had recent labs for the doctor to note on the form. Those are now complete. What should I do in order to have the form filled out?  Thanks Maryse Figueroa

## 2022-11-18 ENCOUNTER — APPOINTMENT (OUTPATIENT)
Dept: LAB | Facility: HOSPITAL | Age: 57
End: 2022-11-18
Attending: PREVENTIVE MEDICINE
Payer: COMMERCIAL

## 2022-11-21 ENCOUNTER — PATIENT MESSAGE (OUTPATIENT)
Dept: FAMILY MEDICINE CLINIC | Facility: CLINIC | Age: 57
End: 2022-11-21

## 2022-11-24 ENCOUNTER — TELEPHONE (OUTPATIENT)
Dept: INTERNAL MEDICINE CLINIC | Facility: HOSPITAL | Age: 57
End: 2022-11-24

## 2022-11-24 DIAGNOSIS — Z20.822 SUSPECTED COVID-19 VIRUS INFECTION: Primary | ICD-10-CM

## 2022-11-25 ENCOUNTER — LAB ENCOUNTER (OUTPATIENT)
Dept: LAB | Age: 57
End: 2022-11-25
Attending: PREVENTIVE MEDICINE
Payer: COMMERCIAL

## 2022-11-25 DIAGNOSIS — Z20.822 SUSPECTED COVID-19 VIRUS INFECTION: ICD-10-CM

## 2022-11-25 LAB — SARS-COV-2 RNA RESP QL NAA+PROBE: NOT DETECTED

## 2022-11-25 NOTE — TELEPHONE ENCOUNTER
Results and RTW guidelines:    COVID RESULT:    [x] Viewed by employee in [address] RTW plan and instructions as indicated on triage call. Manager notified. Estimated RTW date:   [] Discussed with employee   [x] Unable to reach by phone. Sent via Vook message      Test type:    [x] Rapid         [] Alinity         [] Outside test:       [x] NEGATIVE     Ordered Alinity retest?  []Yes   [x] No (skip to RTW)   Ordered Rapid retest?   []Yes   [x] No (skip to RTW)           Dated to be taken:      If Yes, PLACE ORDER NOW and instruct the following:  -Originally Symptomatic or Now Symptoms:   -RTW when sx improve- fever free for 24 hours w/o medications, Diarrhea/Vomiting for 24 hours w/o medications    -Originally  Asymptomatic  -Asymptomatic AND Vaccinated or Unvaccinated or Prior infection in past 90 days:   -May work and continue to monitor symptoms for the next 14 days.                                         -Rapid test day 2, rapid test day 5 (day 0 - exposure)    [] Positive     - Employee should quarantine at home for at least 5 days (day 1 is day after sx onset) , follow the CDC guidelines for cleaning and                              quarantining; see CDC.gov   -This employee may RTW on day 6 if asymptomatic or mildly symptomatic (with improving symptoms). Call Employee Health on day 5 if unable to return on day 6 after                      symptom onset.    -This employee needs to call Employee Health on day 5 after symptom onset. The employee needs to be cleared by Employee Health. - Monitor symptoms and temperature                 - Notify PCP of result                 - Seek emergent care with worsening symptoms   - If employee is still experiencing severe symptoms on day 5 must make a RTW appt with Employee Health, Employee will not be cleared if:    1. Has consistent cough, shortness of breath or fatigue that restricts your physical activities    2. Is still feeling \"unwell\"    3.  Within 15 days of hospitalization for COVID    4. Within 20 days of intubation for COVID    5. Still has a fever, vomiting or diarrhea   - Keep communication open with management about RTW and if symptoms worsen                - If outside testing completed, bring a copy of result to RTW appointment           Notes:     RTW PLAN:    []  If COVID positive results, off work minimum of 5 days from positive test or onset of symptoms (day 0)        On day 5, if asymptomatic or mildly symptomatic (with improving symptoms) may return to work day 6          On day 5, if symptomatic, call Employee Health for RTW screening        []  COVID positive result - call Employee Health on day 5 after symptom onset. The employee needs to be cleared by Employee Health to RTW. [x] RTW immediately, continue to monitor for sx  [] RTW when sx improve; must be fever free for 24 hours w/o medications, Diarrhea/Vomiting free for 24 hours w/o medications  [] Alinity ordered; continue to monitor sx and call for new/worsening sx.   Discuss RTW guidelines with manager  [] May continue to work  [] Follow up with PCP  [] Home until further instruction from hotline with Alinity results  INSTRUCTIONS PROVIDED:  [x]  Plan as noted above  []  Length of time to obtain results   []  Quarantine instructions  []  Masking protocol   []  S/S of worsening infection/condition and importance of prompt medical re-evaluation including when to seek emergency care  [] If symptoms develop, stay home and call hotline for rapid test order    Estimated RTW date:      [] The employee voiced understanding of above plan/instructions  [x] Manager Notified

## 2022-11-28 ENCOUNTER — PATIENT MESSAGE (OUTPATIENT)
Dept: FAMILY MEDICINE CLINIC | Facility: CLINIC | Age: 57
End: 2022-11-28

## 2022-11-28 NOTE — TELEPHONE ENCOUNTER
From: Phan Aden  To: Narciso Vidales DO  Sent: 11/28/2022 8:47 AM CST  Subject: Couple questions     First. I have been losing weight without trying. Never ever had this problem. . the opposite yes! I am down to 155. Any cause for concern. I realize that a colonoscopy would be helpful. The time I had it scheduled I thought I was having an allergic reaction to the golytely. I since decided one of the fruit Popsicles was probably the culprit. Now there office would like me to meet with an NP to discuss proceeding with the test. I finally actually want to do the test.. lol. . and I can't get an appointment until next year which is just a \"what to do about this\" meeting. Any other thing to investigate about the weight loss? I actually have been purposely eating to gain weight! Not working! Just babbling.  Maybe there is no need for concern  Thanks, Bertie Goodpasture

## 2022-11-28 NOTE — TELEPHONE ENCOUNTER
Please call patient to schedule appoint with me to discuss weight loss. Also, please contact GI to see if we can get her in sooner for a colonoscopy as she has not had one done and is now losing weight. I am concerned that there could be issues from a GI standpoint.

## 2022-12-01 ENCOUNTER — PATIENT MESSAGE (OUTPATIENT)
Dept: ENDOCRINOLOGY CLINIC | Facility: CLINIC | Age: 57
End: 2022-12-01

## 2022-12-01 NOTE — TELEPHONE ENCOUNTER
Called Dr. David xie to schedule colonoscopy procedure for Pt. Dr. David xie would like to see her first to discuss the possible allergic reaction. Pt has made an appointment with an NP in Dr. David Mojica office 1/5/23 and will schedule colonoscopy then.

## 2022-12-01 NOTE — TELEPHONE ENCOUNTER
From: Kevin Webb  To: VIOLET Harris  Sent: 12/1/2022 4:37 PM CST  Subject: Accu check    I will start by saying I have been absolutely horrible at taking my blood sugar levels! I promised myself to be on target this week so she could have good data. I failed. Another problem is I have been losing weight without trying so I actually have been eating much more than normal including dessert. I have not gained. I lost another 5lb since October. I don't know what is going on. I was 188 last November and I am 155 now. 35lb would be great if I was trying! But i am not. I will now send my dismal sugar readings.

## 2022-12-02 ENCOUNTER — VIRTUAL PHONE E/M (OUTPATIENT)
Dept: ENDOCRINOLOGY CLINIC | Facility: CLINIC | Age: 57
End: 2022-12-02
Payer: COMMERCIAL

## 2022-12-02 DIAGNOSIS — R80.9 MICROALBUMINURIA: ICD-10-CM

## 2022-12-02 DIAGNOSIS — N18.32 TYPE 2 DIABETES MELLITUS WITH STAGE 3B CHRONIC KIDNEY DISEASE, WITHOUT LONG-TERM CURRENT USE OF INSULIN (HCC): Primary | ICD-10-CM

## 2022-12-02 DIAGNOSIS — E11.22 TYPE 2 DIABETES MELLITUS WITH STAGE 3B CHRONIC KIDNEY DISEASE, WITHOUT LONG-TERM CURRENT USE OF INSULIN (HCC): Primary | ICD-10-CM

## 2022-12-02 PROCEDURE — 99442 PHONE E/M BY PHYS 11-20 MIN: CPT | Performed by: NURSE PRACTITIONER

## 2022-12-02 NOTE — PATIENT INSTRUCTIONS
Continue Metformin ER 750mg 2x/day with breakfast and dinner  Trulicity 4.7FF injection weekly  Farxiga 10mg daily - drink 4-6 eight ounce glasses of water daily to avoid dehydration

## 2022-12-02 NOTE — TELEPHONE ENCOUNTER
Reviewed, will discuss during today's telehealth visit.     Mariza ESPINAL, BC-ADM, [facility][note 2]ES

## 2022-12-05 ENCOUNTER — OFFICE VISIT (OUTPATIENT)
Dept: FAMILY MEDICINE CLINIC | Facility: CLINIC | Age: 57
End: 2022-12-05
Payer: COMMERCIAL

## 2022-12-05 VITALS
HEIGHT: 65 IN | BODY MASS INDEX: 25.99 KG/M2 | HEART RATE: 96 BPM | WEIGHT: 156 LBS | DIASTOLIC BLOOD PRESSURE: 84 MMHG | SYSTOLIC BLOOD PRESSURE: 124 MMHG | RESPIRATION RATE: 14 BRPM | TEMPERATURE: 97 F

## 2022-12-05 DIAGNOSIS — R63.4 WEIGHT LOSS: Primary | ICD-10-CM

## 2022-12-05 DIAGNOSIS — Z12.11 COLON CANCER SCREENING: ICD-10-CM

## 2022-12-05 PROCEDURE — 3008F BODY MASS INDEX DOCD: CPT | Performed by: FAMILY MEDICINE

## 2022-12-05 PROCEDURE — 3074F SYST BP LT 130 MM HG: CPT | Performed by: FAMILY MEDICINE

## 2022-12-05 PROCEDURE — 99214 OFFICE O/P EST MOD 30 MIN: CPT | Performed by: FAMILY MEDICINE

## 2022-12-05 PROCEDURE — 3079F DIAST BP 80-89 MM HG: CPT | Performed by: FAMILY MEDICINE

## 2022-12-05 RX ORDER — KETOCONAZOLE 20 MG/G
CREAM TOPICAL
COMMUNITY
Start: 2022-10-21

## 2022-12-05 RX ORDER — NEOMYCIN SULFATE AND FLUOCINOLONE ACETONIDE 3.5; .25 MG/G; MG/G
CREAM TOPICAL
COMMUNITY
Start: 2022-10-12

## 2022-12-07 ENCOUNTER — PATIENT MESSAGE (OUTPATIENT)
Dept: NEUROLOGY | Facility: CLINIC | Age: 57
End: 2022-12-07

## 2022-12-07 RX ORDER — METHYLPREDNISOLONE 4 MG/1
TABLET ORAL
Qty: 1 EACH | Refills: 0 | Status: SHIPPED | OUTPATIENT
Start: 2022-12-07

## 2022-12-07 NOTE — TELEPHONE ENCOUNTER
Migraine (typical) started suddenly and intensely 5 days ago. Is compliant on preventative    Has used combination of TYLENOL, ELEPTRIPTAN, and ZOFRAN 4 of the five days, migraine returns. Understands the rebound headache concept, did not know what else to do. Did have a common cold just prior to symptoms that were completely resolved when migraines started. Is tearful with weak voice. Believes MDP will help her. Routed to provider.

## 2022-12-07 NOTE — TELEPHONE ENCOUNTER
From: Kathie Swenson  To: RAD Smart  Sent: 12/7/2022 8:43 AM CST  Subject: Migraine     Just like the beginning of November I am on a string of migraines. This is day 6. A smell triggered me on Friday. I have medicated to many. Kinjal Parker times but I can't sleep. I have taken the last two days of work off and I left half day on Monday. I believe perhaps a medrol pack may be needed unless you have another idea.  Mika Shah

## 2022-12-08 ENCOUNTER — LAB ENCOUNTER (OUTPATIENT)
Dept: LAB | Age: 57
End: 2022-12-08
Attending: FAMILY MEDICINE
Payer: COMMERCIAL

## 2022-12-08 DIAGNOSIS — R63.4 WEIGHT LOSS: ICD-10-CM

## 2022-12-08 DIAGNOSIS — F33.1 MAJOR DEPRESSIVE DISORDER, RECURRENT EPISODE, MODERATE (HCC): ICD-10-CM

## 2022-12-08 LAB
ALBUMIN SERPL-MCNC: 4.5 G/DL (ref 3.4–5)
ALBUMIN/GLOB SERPL: 1.5 {RATIO} (ref 1–2)
ALP LIVER SERPL-CCNC: 70 U/L
ALT SERPL-CCNC: 27 U/L
ANION GAP SERPL CALC-SCNC: 8 MMOL/L (ref 0–18)
AST SERPL-CCNC: 18 U/L (ref 15–37)
BASOPHILS # BLD AUTO: 0.09 X10(3) UL (ref 0–0.2)
BASOPHILS NFR BLD AUTO: 1.2 %
BILIRUB SERPL-MCNC: 0.6 MG/DL (ref 0.1–2)
BUN BLD-MCNC: 21 MG/DL (ref 7–18)
BUN/CREAT SERPL: 21.2 (ref 10–20)
CALCIUM BLD-MCNC: 10.7 MG/DL (ref 8.5–10.1)
CHLORIDE SERPL-SCNC: 102 MMOL/L (ref 98–112)
CO2 SERPL-SCNC: 29 MMOL/L (ref 21–32)
CREAT BLD-MCNC: 0.99 MG/DL
DEPRECATED RDW RBC AUTO: 41.9 FL (ref 35.1–46.3)
EOSINOPHIL # BLD AUTO: 0.3 X10(3) UL (ref 0–0.7)
EOSINOPHIL NFR BLD AUTO: 4 %
ERYTHROCYTE [DISTWIDTH] IN BLOOD BY AUTOMATED COUNT: 12.5 % (ref 11–15)
FASTING STATUS PATIENT QL REPORTED: NO
GFR SERPLBLD BASED ON 1.73 SQ M-ARVRAT: 66 ML/MIN/1.73M2 (ref 60–?)
GLOBULIN PLAS-MCNC: 3.1 G/DL (ref 2.8–4.4)
GLUCOSE BLD-MCNC: 154 MG/DL (ref 70–99)
HCT VFR BLD AUTO: 46.5 %
HGB BLD-MCNC: 14.3 G/DL
IMM GRANULOCYTES # BLD AUTO: 0.02 X10(3) UL (ref 0–1)
IMM GRANULOCYTES NFR BLD: 0.3 %
LYMPHOCYTES # BLD AUTO: 1.72 X10(3) UL (ref 1–4)
LYMPHOCYTES NFR BLD AUTO: 22.7 %
MCH RBC QN AUTO: 27.9 PG (ref 26–34)
MCHC RBC AUTO-ENTMCNC: 30.8 G/DL (ref 31–37)
MCV RBC AUTO: 90.6 FL
MONOCYTES # BLD AUTO: 0.44 X10(3) UL (ref 0.1–1)
MONOCYTES NFR BLD AUTO: 5.8 %
NEUTROPHILS # BLD AUTO: 5.01 X10 (3) UL (ref 1.5–7.7)
NEUTROPHILS # BLD AUTO: 5.01 X10(3) UL (ref 1.5–7.7)
NEUTROPHILS NFR BLD AUTO: 66 %
OSMOLALITY SERPL CALC.SUM OF ELEC: 294 MOSM/KG (ref 275–295)
PLATELET # BLD AUTO: 325 10(3)UL (ref 150–450)
POTASSIUM SERPL-SCNC: 5 MMOL/L (ref 3.5–5.1)
PROT SERPL-MCNC: 7.6 G/DL (ref 6.4–8.2)
RBC # BLD AUTO: 5.13 X10(6)UL
SODIUM SERPL-SCNC: 139 MMOL/L (ref 136–145)
THYROGLOB SERPL-MCNC: <15 U/ML (ref ?–60)
THYROPEROXIDASE AB SERPL-ACNC: 57 U/ML (ref ?–60)
TSI SER-ACNC: 0.6 MIU/ML (ref 0.36–3.74)
WBC # BLD AUTO: 7.6 X10(3) UL (ref 4–11)

## 2022-12-08 PROCEDURE — 86376 MICROSOMAL ANTIBODY EACH: CPT

## 2022-12-08 PROCEDURE — 85025 COMPLETE CBC W/AUTO DIFF WBC: CPT

## 2022-12-08 PROCEDURE — 80053 COMPREHEN METABOLIC PANEL: CPT

## 2022-12-08 PROCEDURE — 84443 ASSAY THYROID STIM HORMONE: CPT

## 2022-12-08 PROCEDURE — 80335 ANTIDEPRESSANT TRICYCLIC 1/2: CPT

## 2022-12-08 PROCEDURE — 86800 THYROGLOBULIN ANTIBODY: CPT

## 2022-12-12 ENCOUNTER — LAB ENCOUNTER (OUTPATIENT)
Dept: LAB | Age: 57
End: 2022-12-12
Attending: FAMILY MEDICINE
Payer: COMMERCIAL

## 2022-12-12 DIAGNOSIS — Z12.11 COLON CANCER SCREENING: ICD-10-CM

## 2022-12-12 DIAGNOSIS — R63.4 WEIGHT LOSS: ICD-10-CM

## 2022-12-12 LAB — HEMOCCULT STL QL: NEGATIVE

## 2022-12-12 PROCEDURE — 82274 ASSAY TEST FOR BLOOD FECAL: CPT

## 2022-12-13 LAB — NORTRIPTYLINE: 49 NG/ML

## 2023-01-04 ENCOUNTER — TELEPHONE (OUTPATIENT)
Dept: NEUROLOGY | Facility: CLINIC | Age: 58
End: 2023-01-04

## 2023-01-04 NOTE — TELEPHONE ENCOUNTER
RN faxed paperwork to [employer] at [phone]. Fax confirmation received. RN informed the patient we faxed the paperwork. Copy of paperwork sent to scan.

## 2023-01-06 ENCOUNTER — TELEPHONE (OUTPATIENT)
Dept: NEUROLOGY | Facility: CLINIC | Age: 58
End: 2023-01-06

## 2023-01-06 NOTE — TELEPHONE ENCOUNTER
Antwon Canas at [facility] and Lidia peters [facility] 6 month follow up appt on 2/2/23.

## 2023-01-07 DIAGNOSIS — E11.22 TYPE 2 DIABETES MELLITUS WITH STAGE 3B CHRONIC KIDNEY DISEASE, WITHOUT LONG-TERM CURRENT USE OF INSULIN (HCC): ICD-10-CM

## 2023-01-07 DIAGNOSIS — N18.32 TYPE 2 DIABETES MELLITUS WITH STAGE 3B CHRONIC KIDNEY DISEASE, WITHOUT LONG-TERM CURRENT USE OF INSULIN (HCC): ICD-10-CM

## 2023-01-09 RX ORDER — DAPAGLIFLOZIN 10 MG/1
TABLET, FILM COATED ORAL
Qty: 90 TABLET | Refills: 0 | Status: SHIPPED | OUTPATIENT
Start: 2023-01-09

## 2023-01-12 DIAGNOSIS — N18.32 TYPE 2 DIABETES MELLITUS WITH STAGE 3B CHRONIC KIDNEY DISEASE, WITHOUT LONG-TERM CURRENT USE OF INSULIN (HCC): ICD-10-CM

## 2023-01-12 DIAGNOSIS — E11.22 TYPE 2 DIABETES MELLITUS WITH STAGE 3B CHRONIC KIDNEY DISEASE, WITHOUT LONG-TERM CURRENT USE OF INSULIN (HCC): ICD-10-CM

## 2023-01-13 RX ORDER — DAPAGLIFLOZIN 10 MG/1
TABLET, FILM COATED ORAL
Qty: 30 TABLET | Refills: 1 | Status: SHIPPED | OUTPATIENT
Start: 2023-01-13

## 2023-01-18 DIAGNOSIS — N18.32 TYPE 2 DIABETES MELLITUS WITH STAGE 3B CHRONIC KIDNEY DISEASE, WITHOUT LONG-TERM CURRENT USE OF INSULIN (HCC): ICD-10-CM

## 2023-01-18 DIAGNOSIS — E11.22 TYPE 2 DIABETES MELLITUS WITH STAGE 3B CHRONIC KIDNEY DISEASE, WITHOUT LONG-TERM CURRENT USE OF INSULIN (HCC): ICD-10-CM

## 2023-01-24 ENCOUNTER — NURSE ONLY (OUTPATIENT)
Dept: NEUROLOGY | Facility: CLINIC | Age: 58
End: 2023-01-24
Payer: COMMERCIAL

## 2023-01-24 DIAGNOSIS — G43.001 MIGRAINE WITHOUT AURA AND WITH STATUS MIGRAINOSUS, NOT INTRACTABLE: Primary | ICD-10-CM

## 2023-01-24 PROCEDURE — 96372 THER/PROPH/DIAG INJ SC/IM: CPT | Performed by: OTHER

## 2023-01-24 RX ORDER — KETOROLAC TROMETHAMINE 30 MG/ML
30 INJECTION, SOLUTION INTRAMUSCULAR; INTRAVENOUS ONCE
Status: COMPLETED | OUTPATIENT
Start: 2023-01-24 | End: 2023-01-24

## 2023-01-24 RX ORDER — DEXAMETHASONE SODIUM PHOSPHATE 10 MG/ML
10 INJECTION, SOLUTION INTRAMUSCULAR; INTRAVENOUS ONCE
Status: COMPLETED | OUTPATIENT
Start: 2023-01-24 | End: 2023-01-24

## 2023-01-24 RX ADMIN — KETOROLAC TROMETHAMINE 30 MG: 30 INJECTION, SOLUTION INTRAMUSCULAR; INTRAVENOUS at 14:09:00

## 2023-01-24 RX ADMIN — KETOROLAC TROMETHAMINE 30 MG: 30 INJECTION, SOLUTION INTRAMUSCULAR; INTRAVENOUS at 13:16:00

## 2023-01-24 RX ADMIN — DEXAMETHASONE SODIUM PHOSPHATE 10 MG: 10 INJECTION, SOLUTION INTRAMUSCULAR; INTRAVENOUS at 13:15:00

## 2023-02-02 ENCOUNTER — OFFICE VISIT (OUTPATIENT)
Dept: NEUROLOGY | Facility: CLINIC | Age: 58
End: 2023-02-02
Payer: OTHER MISCELLANEOUS

## 2023-02-02 VITALS
BODY MASS INDEX: 25.83 KG/M2 | SYSTOLIC BLOOD PRESSURE: 119 MMHG | WEIGHT: 155 LBS | RESPIRATION RATE: 16 BRPM | HEIGHT: 65 IN | HEART RATE: 82 BPM | DIASTOLIC BLOOD PRESSURE: 59 MMHG

## 2023-02-02 DIAGNOSIS — G43.711 MIGRAINE, CHRONIC, WITHOUT AURA, INTRACTABLE, WITH STATUS MIGRAINOSUS: ICD-10-CM

## 2023-02-02 DIAGNOSIS — S00.10XA CONTUSION OF EYELID, UNSPECIFIED LATERALITY, INITIAL ENCOUNTER: Primary | ICD-10-CM

## 2023-02-02 RX ORDER — ONDANSETRON 4 MG/1
TABLET, FILM COATED ORAL
Qty: 30 TABLET | Refills: 0 | Status: SHIPPED | OUTPATIENT
Start: 2023-02-02

## 2023-02-02 RX ORDER — DEXAMETHASONE 2 MG/1
TABLET ORAL
Qty: 20 TABLET | Refills: 1 | Status: SHIPPED | OUTPATIENT
Start: 2023-02-02

## 2023-02-03 ENCOUNTER — OFFICE VISIT (OUTPATIENT)
Dept: OTHER | Facility: HOSPITAL | Age: 58
End: 2023-02-03
Attending: FAMILY MEDICINE

## 2023-02-03 ENCOUNTER — TELEPHONE (OUTPATIENT)
Dept: NEUROLOGY | Facility: CLINIC | Age: 58
End: 2023-02-03

## 2023-02-03 RX ORDER — CYCLOBENZAPRINE HCL 10 MG
10 TABLET ORAL NIGHTLY PRN
Qty: 30 TABLET | Refills: 0 | Status: SHIPPED | OUTPATIENT
Start: 2023-02-03 | End: 2023-03-05

## 2023-02-03 NOTE — TELEPHONE ENCOUNTER
Faxed 2/2/23 ov notes to employers claim services for [facility].   AttGus Messing  Fax sent & approved

## 2023-02-06 ENCOUNTER — TELEPHONE (OUTPATIENT)
Dept: ENDOCRINOLOGY CLINIC | Facility: CLINIC | Age: 58
End: 2023-02-06

## 2023-02-06 ENCOUNTER — OFFICE VISIT (OUTPATIENT)
Dept: ENDOCRINOLOGY CLINIC | Facility: CLINIC | Age: 58
End: 2023-02-06
Payer: COMMERCIAL

## 2023-02-06 VITALS
RESPIRATION RATE: 16 BRPM | DIASTOLIC BLOOD PRESSURE: 68 MMHG | BODY MASS INDEX: 25.33 KG/M2 | HEIGHT: 65 IN | HEART RATE: 87 BPM | WEIGHT: 152 LBS | SYSTOLIC BLOOD PRESSURE: 116 MMHG

## 2023-02-06 DIAGNOSIS — E11.29 TYPE 2 DIABETES MELLITUS WITH MICROALBUMINURIA, WITHOUT LONG-TERM CURRENT USE OF INSULIN (HCC): Primary | ICD-10-CM

## 2023-02-06 DIAGNOSIS — I10 PRIMARY HYPERTENSION: ICD-10-CM

## 2023-02-06 DIAGNOSIS — E78.2 MIXED HYPERLIPIDEMIA: ICD-10-CM

## 2023-02-06 DIAGNOSIS — R80.9 TYPE 2 DIABETES MELLITUS WITH MICROALBUMINURIA, WITHOUT LONG-TERM CURRENT USE OF INSULIN (HCC): Primary | ICD-10-CM

## 2023-02-06 LAB
CARTRIDGE LOT#: 30 NUMERIC
CREAT UR-SCNC: 54.4 MG/DL
HEMOGLOBIN A1C: 6.2 % (ref 4.3–5.6)
MICROALBUMIN UR-MCNC: 2.26 MG/DL
MICROALBUMIN/CREAT 24H UR-RTO: 41.5 UG/MG (ref ?–30)

## 2023-02-06 PROCEDURE — 99214 OFFICE O/P EST MOD 30 MIN: CPT | Performed by: NURSE PRACTITIONER

## 2023-02-06 PROCEDURE — 82570 ASSAY OF URINE CREATININE: CPT | Performed by: NURSE PRACTITIONER

## 2023-02-06 PROCEDURE — 83036 HEMOGLOBIN GLYCOSYLATED A1C: CPT | Performed by: NURSE PRACTITIONER

## 2023-02-06 PROCEDURE — 82043 UR ALBUMIN QUANTITATIVE: CPT | Performed by: NURSE PRACTITIONER

## 2023-02-06 PROCEDURE — 3060F POS MICROALBUMINURIA REV: CPT | Performed by: FAMILY MEDICINE

## 2023-02-06 PROCEDURE — 3008F BODY MASS INDEX DOCD: CPT | Performed by: NURSE PRACTITIONER

## 2023-02-06 PROCEDURE — 3044F HG A1C LEVEL LT 7.0%: CPT | Performed by: FAMILY MEDICINE

## 2023-02-06 PROCEDURE — 3074F SYST BP LT 130 MM HG: CPT | Performed by: NURSE PRACTITIONER

## 2023-02-06 PROCEDURE — 3078F DIAST BP <80 MM HG: CPT | Performed by: NURSE PRACTITIONER

## 2023-02-06 NOTE — TELEPHONE ENCOUNTER
Contacted [facility] office to get pt eye exam report and Their office states that pt last Diabetic eye exam was back in 2021. Contacted pt to inform her and she states that Dr. Ashly Frazier said the check up she had back in July 2022 would count for her diabetic eye exam. Informed her what the office stated. Pt responded that she will follow up with them.

## 2023-02-07 ENCOUNTER — TELEPHONE (OUTPATIENT)
Dept: FAMILY MEDICINE CLINIC | Facility: CLINIC | Age: 58
End: 2023-02-07

## 2023-02-07 RX ORDER — AMOXICILLIN 500 MG/1
CAPSULE ORAL
Qty: 4 CAPSULE | Refills: 0 | Status: SHIPPED | OUTPATIENT
Start: 2023-02-07

## 2023-02-07 NOTE — TELEPHONE ENCOUNTER
Lm on vm to cb. Need to ask if allergic to Amoxicillin prior to sending to pharmacy. Per pt earlier that this is the first time she had to do this. She hasn't seen a dentist in awhile and had a hip replacement.

## 2023-02-07 NOTE — TELEPHONE ENCOUNTER
Called pt back and she states her dentist took a look at her teeth and offered to see pt next week. States Dentist is not requiring her to pre med, but her surgeon is. Pt denies any allergy to amoxicillin. Informed pt that Jennifer Mclaughlin is okay with providing the first RX, but advised pt to call her surgeon and inquire how long will she need to be on pre medicated for dental work and if they can fill this for her. Pt voiced understanding and agreed to call her surgeon to discuss above.

## 2023-02-07 NOTE — TELEPHONE ENCOUNTER
S/W pt and she states she is currently at her dental's office for tooth pain. Per her medical history, she has had hip replacement and she will need to be pre medicated prior to any procedure. Asking for an RX.

## 2023-02-07 NOTE — TELEPHONE ENCOUNTER
I do not see any history of amoxicillin on her medication list.  Typically, as long as the person is not allergic to penicillins/amoxicillin, we use amoxicillin 500 mg capsules si p.o. 30 to 60 minutes prior to procedure #4.

## 2023-02-07 NOTE — TELEPHONE ENCOUNTER
Transferred pt live to triage as she is currently at dental office and they have question regarding pt health/history.

## 2023-02-08 ENCOUNTER — PATIENT MESSAGE (OUTPATIENT)
Dept: ENDOCRINOLOGY CLINIC | Facility: CLINIC | Age: 58
End: 2023-02-08

## 2023-02-08 NOTE — TELEPHONE ENCOUNTER
From: May Carvalho  To: Jearld Phoenix, APN  Sent: 2/8/2023 2:06 PM CST  Subject: Labs     Just checking in on what you see with my lab results.  Thanks

## 2023-02-16 ENCOUNTER — MED REC SCAN ONLY (OUTPATIENT)
Dept: FAMILY MEDICINE CLINIC | Facility: CLINIC | Age: 58
End: 2023-02-16

## 2023-03-17 DIAGNOSIS — E78.2 MIXED HYPERLIPIDEMIA: ICD-10-CM

## 2023-03-17 RX ORDER — ROSUVASTATIN CALCIUM 20 MG/1
TABLET, COATED ORAL
Qty: 90 TABLET | Refills: 0 | Status: SHIPPED | OUTPATIENT
Start: 2023-03-17

## 2023-03-20 RX ORDER — ATOGEPANT 30 MG/1
TABLET ORAL
Qty: 90 TABLET | Refills: 1 | Status: SHIPPED | OUTPATIENT
Start: 2023-03-20

## 2023-03-31 ENCOUNTER — PATIENT MESSAGE (OUTPATIENT)
Dept: FAMILY MEDICINE CLINIC | Facility: CLINIC | Age: 58
End: 2023-03-31

## 2023-03-31 ENCOUNTER — APPOINTMENT (OUTPATIENT)
Dept: GENERAL RADIOLOGY | Age: 58
End: 2023-03-31
Attending: STUDENT IN AN ORGANIZED HEALTH CARE EDUCATION/TRAINING PROGRAM
Payer: COMMERCIAL

## 2023-03-31 ENCOUNTER — HOSPITAL ENCOUNTER (OUTPATIENT)
Age: 58
Discharge: HOME | End: 2023-03-31
Attending: STUDENT IN AN ORGANIZED HEALTH CARE EDUCATION/TRAINING PROGRAM
Payer: COMMERCIAL

## 2023-03-31 VITALS
DIASTOLIC BLOOD PRESSURE: 78 MMHG | RESPIRATION RATE: 18 BRPM | SYSTOLIC BLOOD PRESSURE: 126 MMHG | TEMPERATURE: 99 F | OXYGEN SATURATION: 97 % | HEART RATE: 78 BPM

## 2023-03-31 DIAGNOSIS — U07.1 COVID-19: Primary | ICD-10-CM

## 2023-03-31 LAB
#MXD IC: 1 X10ˆ3/UL (ref 0.1–1)
BUN BLD-MCNC: 15 MG/DL (ref 7–18)
CHLORIDE BLD-SCNC: 102 MMOL/L (ref 98–112)
CO2 BLD-SCNC: 22 MMOL/L (ref 21–32)
CREAT BLD-MCNC: 0.7 MG/DL
GFR SERPLBLD BASED ON 1.73 SQ M-ARVRAT: 100 ML/MIN/1.73M2 (ref 60–?)
GLUCOSE BLD-MCNC: 152 MG/DL (ref 70–99)
HCT VFR BLD AUTO: 42.1 %
HCT VFR BLD CALC: 41 %
HGB BLD-MCNC: 13 G/DL
ISTAT IONIZED CALCIUM FOR CHEM 8: 1.16 MMOL/L (ref 1.12–1.32)
LYMPHOCYTES # BLD AUTO: 1.4 X10ˆ3/UL (ref 1–4)
LYMPHOCYTES NFR BLD AUTO: 22 %
MCH RBC QN AUTO: 26.7 PG (ref 26–34)
MCHC RBC AUTO-ENTMCNC: 30.9 G/DL (ref 31–37)
MCV RBC AUTO: 86.4 FL (ref 80–100)
MIXED CELL %: 15.5 %
NEUTROPHILS # BLD AUTO: 4 X10ˆ3/UL (ref 1.5–7.7)
NEUTROPHILS NFR BLD AUTO: 62.5 %
PLATELET # BLD AUTO: 211 X10ˆ3/UL (ref 150–450)
POTASSIUM BLD-SCNC: 3.4 MMOL/L (ref 3.6–5.1)
RBC # BLD AUTO: 4.87 X10ˆ6/UL
SARS-COV-2 RNA RESP QL NAA+PROBE: DETECTED
SODIUM BLD-SCNC: 138 MMOL/L (ref 136–145)
TROPONIN I BLD-MCNC: <0.02 NG/ML
WBC # BLD AUTO: 6.4 X10ˆ3/UL (ref 4–11)

## 2023-03-31 PROCEDURE — 36415 COLL VENOUS BLD VENIPUNCTURE: CPT

## 2023-03-31 PROCEDURE — 94640 AIRWAY INHALATION TREATMENT: CPT

## 2023-03-31 PROCEDURE — 84484 ASSAY OF TROPONIN QUANT: CPT

## 2023-03-31 PROCEDURE — 71046 X-RAY EXAM CHEST 2 VIEWS: CPT | Performed by: STUDENT IN AN ORGANIZED HEALTH CARE EDUCATION/TRAINING PROGRAM

## 2023-03-31 PROCEDURE — 93010 ELECTROCARDIOGRAM REPORT: CPT

## 2023-03-31 PROCEDURE — 93005 ELECTROCARDIOGRAM TRACING: CPT

## 2023-03-31 PROCEDURE — 80047 BASIC METABLC PNL IONIZED CA: CPT

## 2023-03-31 PROCEDURE — 85025 COMPLETE CBC W/AUTO DIFF WBC: CPT | Performed by: STUDENT IN AN ORGANIZED HEALTH CARE EDUCATION/TRAINING PROGRAM

## 2023-03-31 PROCEDURE — 99215 OFFICE O/P EST HI 40 MIN: CPT

## 2023-03-31 PROCEDURE — 99214 OFFICE O/P EST MOD 30 MIN: CPT

## 2023-03-31 RX ORDER — ACETAMINOPHEN 500 MG
1000 TABLET ORAL ONCE
Status: DISCONTINUED | OUTPATIENT
Start: 2023-03-31 | End: 2023-03-31

## 2023-03-31 RX ORDER — ALBUTEROL SULFATE 90 UG/1
2 AEROSOL, METERED RESPIRATORY (INHALATION) ONCE
Status: COMPLETED | OUTPATIENT
Start: 2023-03-31 | End: 2023-03-31

## 2023-03-31 NOTE — ED INITIAL ASSESSMENT (HPI)
Pt here w/ c/o +covid test at home. Pt states her chest feels tight. Pt w/ slight cough, congestion.

## 2023-03-31 NOTE — TELEPHONE ENCOUNTER
From: Emmie Owusu  To: HEATHER RIVERA DO  Sent: 3/31/2023 3:10 PM CDT  Subject: COVID    I just tested positive for COVID and I am going to [facility] in a week. Is there medicine I can take so I can make it out of town?  Lindy Erwin

## 2023-03-31 NOTE — TELEPHONE ENCOUNTER
Tried calling patient to discuss her symptoms, reached LLOYD RUSSELL to refer to [employer] message.

## 2023-04-01 ENCOUNTER — TELEPHONE (OUTPATIENT)
Dept: INTERNAL MEDICINE CLINIC | Facility: HOSPITAL | Age: 58
End: 2023-04-01

## 2023-04-02 LAB
ATRIAL RATE: 99 BPM
P AXIS: 18 DEGREES
P-R INTERVAL: 162 MS
Q-T INTERVAL: 324 MS
QRS DURATION: 86 MS
QTC CALCULATION (BEZET): 415 MS
R AXIS: 7 DEGREES
T AXIS: 39 DEGREES
VENTRICULAR RATE: 99 BPM

## 2023-04-03 NOTE — TELEPHONE ENCOUNTER
I called pt to get an update on her condition. She went to the [facility] on 03/31. She is COVID positive. She is starting to feel better over the last 2 days. She no longer had any chest pain or shortness of breath. She does still have a low grade fever. She is alternating tylenol and motrin as needed. Pt aware she must self isolate for 5 days from the onset of sx and be fever free for 24 hours without  using any antipyretics. I informed her she will then need to mask for an additional 5 days after. Pt advised to call if she has any concerns or questions.  Pt. Agreed to plan and verbalized understanding

## 2023-04-18 DIAGNOSIS — E11.22 TYPE 2 DIABETES MELLITUS WITH STAGE 3B CHRONIC KIDNEY DISEASE, WITHOUT LONG-TERM CURRENT USE OF INSULIN (HCC): ICD-10-CM

## 2023-04-18 DIAGNOSIS — N18.32 TYPE 2 DIABETES MELLITUS WITH STAGE 3B CHRONIC KIDNEY DISEASE, WITHOUT LONG-TERM CURRENT USE OF INSULIN (HCC): ICD-10-CM

## 2023-04-18 RX ORDER — METFORMIN HYDROCHLORIDE 750 MG/1
TABLET, EXTENDED RELEASE ORAL
Qty: 60 TABLET | Refills: 1 | Status: SHIPPED | OUTPATIENT
Start: 2023-04-18

## 2023-04-29 DIAGNOSIS — N18.32 TYPE 2 DIABETES MELLITUS WITH STAGE 3B CHRONIC KIDNEY DISEASE, WITHOUT LONG-TERM CURRENT USE OF INSULIN (HCC): ICD-10-CM

## 2023-04-29 DIAGNOSIS — E11.22 TYPE 2 DIABETES MELLITUS WITH STAGE 3B CHRONIC KIDNEY DISEASE, WITHOUT LONG-TERM CURRENT USE OF INSULIN (HCC): ICD-10-CM

## 2023-05-01 RX ORDER — DULAGLUTIDE 4.5 MG/.5ML
4.5 INJECTION, SOLUTION SUBCUTANEOUS WEEKLY
Qty: 6 ML | Refills: 1 | Status: SHIPPED | OUTPATIENT
Start: 2023-05-01

## 2023-05-02 PROBLEM — K22.9 IRREGULAR Z LINE OF ESOPHAGUS: Status: ACTIVE | Noted: 2023-05-02

## 2023-05-02 PROBLEM — R19.4 CHANGE IN BOWEL HABITS: Status: ACTIVE | Noted: 2023-05-02

## 2023-05-02 PROBLEM — K64.8 INTERNAL HEMORRHOIDS: Status: ACTIVE | Noted: 2023-05-02

## 2023-05-02 PROBLEM — D12.2 BENIGN NEOPLASM OF ASCENDING COLON: Status: ACTIVE | Noted: 2023-05-02

## 2023-05-02 PROBLEM — R63.4 LOSS OF WEIGHT: Status: ACTIVE | Noted: 2023-05-02

## 2023-05-02 PROBLEM — K63.5 COLON POLYP: Status: ACTIVE | Noted: 2023-05-02

## 2023-05-02 PROBLEM — R11.2 NAUSEA AND/OR VOMITING: Status: ACTIVE | Noted: 2023-05-02

## 2023-05-08 ENCOUNTER — TELEPHONE (OUTPATIENT)
Dept: NEUROLOGY | Facility: CLINIC | Age: 58
End: 2023-05-08

## 2023-05-08 NOTE — TELEPHONE ENCOUNTER
PA requested for Zolmitriptan  Clinical questions answered and submitted to insurance  Awaiting coverage determination

## 2023-05-09 NOTE — TELEPHONE ENCOUNTER
Msg received from [address] [place] [address],[address] regarding Zolmitriptan solution    Sates this product does not require prior authorization    No further action needed, closing encounter

## 2023-05-15 NOTE — PATIENT INSTRUCTIONS
Refill policies:    • Allow 2-3 business days for refills; controlled substances may take longer.   • Contact your pharmacy at least 5 days prior to running out of medication and have them send an electronic request or submit request through the “request re no lesions,  no deformities, no significant scars are present,  chest wall non-tender,  breathing is unlabored without accessory muscle use,  clear to auscultation bilaterally Depending on your insurance carrier, approval may take 3-10 days. It is highly recommended patients contact their insurance carrier directly to determine coverage.   If test is done without insurance authorization, patient may be responsible for the entire

## 2023-06-02 ENCOUNTER — PATIENT MESSAGE (OUTPATIENT)
Dept: ENDOCRINOLOGY CLINIC | Facility: CLINIC | Age: 58
End: 2023-06-02

## 2023-06-02 DIAGNOSIS — N18.32 TYPE 2 DIABETES MELLITUS WITH STAGE 3B CHRONIC KIDNEY DISEASE, WITHOUT LONG-TERM CURRENT USE OF INSULIN (HCC): ICD-10-CM

## 2023-06-02 DIAGNOSIS — E11.22 TYPE 2 DIABETES MELLITUS WITH STAGE 3B CHRONIC KIDNEY DISEASE, WITHOUT LONG-TERM CURRENT USE OF INSULIN (HCC): ICD-10-CM

## 2023-06-02 RX ORDER — METFORMIN HYDROCHLORIDE 750 MG/1
750 TABLET, EXTENDED RELEASE ORAL 2 TIMES DAILY WITH MEALS
Qty: 180 TABLET | Refills: 0 | Status: SHIPPED | OUTPATIENT
Start: 2023-06-02

## 2023-06-02 NOTE — TELEPHONE ENCOUNTER
LOV: 02/2023  Future Appointments   Date Time Provider Praveena Gonzales   8/7/2023 10:30 AM Sheridan Lowry MD [facility]   8/31/2023 10:00 AM Sheridan Lowry MD [facility]     Last A1c value was 6.2% done 2/6/2023.

## 2023-06-02 NOTE — TELEPHONE ENCOUNTER
From: Mary Alice Jackson  To: VIOLET Em  Sent: 6/2/2023 8:12 AM CDT  Subject: Missed appt    I had a family issue and had to cancel my last appointment. I would like to reschedule but when I try and do it here on MyChart, the next available is the end of August. Is that OK with her? No acute changes. I would need my med refills though. And she changed metformin to daily.    Please tell me how to proceed  Connor Alvarenga

## 2023-06-26 ENCOUNTER — PATIENT MESSAGE (OUTPATIENT)
Dept: NEUROLOGY | Facility: CLINIC | Age: 58
End: 2023-06-26

## 2023-06-26 NOTE — TELEPHONE ENCOUNTER
From: Canelo Isaac  To: RAD Rosas  Sent: 6/26/2023 2:30 PM CDT  Subject: Migraines back    This was a horrible month. I tried the steroid addition. I had a string of 5 days again at the beginning of the month. I medicated with the steroid and triptan cocktail for 3 days. The migraine continued for two more days but more mild. The 14th of June I got a severe migraine flying to [facility]. I had to medicate for the 4 days there but I had forgotten the steroids. I have continued since the 14th to have a consistent level of some migraine pain. The best days I wear sunglasses and deal with the pressure. The worst days I just sit in a quiet dark room. I was trying not to use any more triptans for the month since I have already used 7. This weekend was awful and I had to use meds again last night. This is the 10th day straight of pain. The days I do medicate I have full relief and can function normally. So that is at least good. I don't know why I didn't check in earlier. I think I see you guys in August. Should I just keep status quo? Increase steroid dose?  Chop my head off? lol  Thanks, Camila Must

## 2023-07-14 ENCOUNTER — TELEPHONE (OUTPATIENT)
Dept: NEUROLOGY | Facility: CLINIC | Age: 58
End: 2023-07-14

## 2023-07-14 DIAGNOSIS — G43.711 MIGRAINE, CHRONIC, WITHOUT AURA, INTRACTABLE, WITH STATUS MIGRAINOSUS: Primary | ICD-10-CM

## 2023-07-14 NOTE — TELEPHONE ENCOUNTER
VINCENT Other order placed for botox, all questions answered.   Routed to [facility] Prior Authorization Team

## 2023-07-14 NOTE — TELEPHONE ENCOUNTER
Spoke with patient and I am recommending getting Botox for approval and to get her in once it is approved  Greater than 15 HA days a month  Failed almost all migraine preventative trial    Rocio Layne MD  Vascular & General Neurology  Director, Multiple Sclerosis Program  [facility]  7/14/2023, Time completed 11:03 AM

## 2023-07-20 NOTE — TELEPHONE ENCOUNTER
Per Epic review:    Spoke with patient and I am recommending getting Botox for approval and to get her in once it is approved  Greater than 15 HA days a month  Failed almost all migraine preventative trial     Marlene Taylor MD  Vascular & General Neurology  Director, Multiple Sclerosis Program  [facility]  7/14/2023, Time completed 11:03 AM

## 2023-07-20 NOTE — TELEPHONE ENCOUNTER
Reviewed [facility] Botox referral and do not see any Prophylactic medications. [facility] requires the patient to have tried and failed at least 2 medications from different classes. Not sure if Yumiko Johnson and Mikki Aviles will work for Prophylactic medications alone.    Please review referral and advise

## 2023-07-28 DIAGNOSIS — G43.711 MIGRAINE, CHRONIC, WITHOUT AURA, INTRACTABLE, WITH STATUS MIGRAINOSUS: ICD-10-CM

## 2023-07-28 RX ORDER — ONDANSETRON 4 MG/1
TABLET, FILM COATED ORAL
Qty: 30 TABLET | Refills: 2 | Status: SHIPPED | OUTPATIENT
Start: 2023-07-28

## 2023-07-28 NOTE — TELEPHONE ENCOUNTER
Medication: ondansetron (ZOFRAN) 4 mg      Date of last refill: 23 (#30/0R)  [place]): [place]     Last office visit: 23  Due back to clinic per last office note:  6 mon  Date next office visit scheduled:    Future Appointments   Date Time Provider Praveena Janet   8/3/2023 10:40 AM Marion Varela PA [facility] [place]   2023 10:30 AM Kiko Bradshaw MD [address] [address] E[facility] SUB GI   2023 10:00 AM Kiko Bradshaw MD [facility]   2023  1:30 PM Lenore Henriquez APN [facility]           Last OV note recommendation:    Problem/s Identified this visit:   Contusion of eyelid, unspecified laterality, initial encounter  (primary encounter diagnosis)  Migraine, chronic, without aura, intractable, with status migrainosus        Discussion plus Diagnostics & Treatment Orders:  Orders Placed This Encounter      dexamethasone 2 MG Oral Tab          Si tab with the first dose of Migraine medication intervention          Dispense:  20 tablet          Refill:  1      ondansetron (ZOFRAN) 4 mg tablet          Si tab to be taken with the first dose of migraine medication          Dispense:  30 tablet          Refill:  0     Going to modify the augmentation strategy in the sense that instead of Tylenol were going to replace that with dexamethasone. So with the first dose of eletriptan she takes 2 mg dexamethasone and Zofran. May still use the Tylenol as needed. If she experiences a headache the second day she is allowed to use the dexamethasone the same way.   This approach is hoping that she will not have those back-to-back headaches

## 2023-08-02 PROCEDURE — 82705 FATS/LIPIDS FECES QUAL: CPT

## 2023-08-02 PROCEDURE — 82656 EL-1 FECAL QUAL/SEMIQ: CPT

## 2023-08-02 PROCEDURE — 83993 ASSAY FOR CALPROTECTIN FECAL: CPT

## 2023-08-03 ENCOUNTER — LAB ENCOUNTER (OUTPATIENT)
Dept: LAB | Facility: HOSPITAL | Age: 58
End: 2023-08-03
Attending: INTERNAL MEDICINE
Payer: COMMERCIAL

## 2023-08-03 DIAGNOSIS — R19.4 BOWEL HABIT CHANGES: Primary | ICD-10-CM

## 2023-08-03 NOTE — TELEPHONE ENCOUNTER
Approval #[id number] 4 visits from 7/21/23-7/21/24     This is buy and bill    Once 200 units available call patient for appointment. Please notify the PA team of the date.

## 2023-08-04 LAB — FATS NEUTRAL: NORMAL

## 2023-08-07 PROBLEM — R13.10 DYSPHAGIA: Status: ACTIVE | Noted: 2023-08-07

## 2023-08-07 PROBLEM — K22.70 BARRETT'S ESOPHAGUS: Status: ACTIVE | Noted: 2023-08-07

## 2023-08-07 PROBLEM — K20.0 EOSINOPHILIC ESOPHAGITIS: Status: ACTIVE | Noted: 2023-08-07

## 2023-08-07 LAB — CALPROTECTIN STL-MCNT: 120 ΜG/G (ref ?–50)

## 2023-08-08 LAB — PANCREATIC ELAST FECAL: 169 UG ELAST./G

## 2023-08-14 ENCOUNTER — TELEPHONE (OUTPATIENT)
Dept: NEUROLOGY | Facility: CLINIC | Age: 58
End: 2023-08-14

## 2023-08-14 ENCOUNTER — PATIENT MESSAGE (OUTPATIENT)
Dept: FAMILY MEDICINE CLINIC | Facility: CLINIC | Age: 58
End: 2023-08-14

## 2023-08-14 ENCOUNTER — OFFICE VISIT (OUTPATIENT)
Dept: NEUROLOGY | Facility: CLINIC | Age: 58
End: 2023-08-14
Payer: COMMERCIAL

## 2023-08-14 VITALS
SYSTOLIC BLOOD PRESSURE: 132 MMHG | HEIGHT: 65 IN | HEART RATE: 92 BPM | BODY MASS INDEX: 24.66 KG/M2 | RESPIRATION RATE: 16 BRPM | WEIGHT: 148 LBS | DIASTOLIC BLOOD PRESSURE: 82 MMHG

## 2023-08-14 DIAGNOSIS — M72.2 PLANTAR FASCIITIS: Primary | ICD-10-CM

## 2023-08-14 DIAGNOSIS — G43.711 MIGRAINE, CHRONIC, WITHOUT AURA, INTRACTABLE, WITH STATUS MIGRAINOSUS: Primary | ICD-10-CM

## 2023-08-14 RX ORDER — ZAVEGEPANT 10 MG/.1ML
10 SPRAY NASAL ONCE AS NEEDED
Qty: 6 EACH | Refills: 3 | Status: SHIPPED | OUTPATIENT
Start: 2023-08-14 | End: 2023-08-14

## 2023-08-14 NOTE — TELEPHONE ENCOUNTER
Received via fax from [facility] [address]; Missing/illegible information on Rx; Drug not covered. PA initiated via EPIC. PA closed with message, Not covered in drug plan. Patient was provided with co-pay card during office visit. LM with patient to please scan card and call pharmacy to see what cost will be. Patient then called office, will register co-pay card.

## 2023-08-14 NOTE — PROGRESS NOTES
Paperwork noting that patient may bear financial responsibility for procedure(s) performed in clinic today signed prior to proceeding with procedure(s). Furthermore, patient notified that they should contact their insurer to verify that your procedure/test has been approved and is a COVERED benefit. Although the [facility] staff does its due diligence, the insurance carrier gives the disclaimer that \"Although the procedure is authorized, this does not guarantee payment. \"    Ultimately the patient is responsible for payment.     Botox is:  [x] Buy and Bill  [] Patient Supplied

## 2023-08-17 RX ORDER — METHYLPREDNISOLONE 4 MG/1
TABLET ORAL
Qty: 21 EACH | Refills: 0 | Status: SHIPPED | OUTPATIENT
Start: 2023-08-17

## 2023-08-17 NOTE — TELEPHONE ENCOUNTER
Medrol dose rodrigo prescription and PT referral placed. [facility] sent to Pt letting her know of orders.

## 2023-08-18 ENCOUNTER — TELEPHONE (OUTPATIENT)
Dept: NEUROLOGY | Facility: CLINIC | Age: 58
End: 2023-08-18

## 2023-08-18 NOTE — TELEPHONE ENCOUNTER
----- Message from Boubacar Mooney. Gene Lloyd sent at 8/17/2023  7:42 PM CDT -----  Regarding: [facility] Paperwork  Contact: [phone]  Good Evening    Attached is my [facility] renewal paperwork that requires recertification.      Thank you

## 2023-08-18 NOTE — TELEPHONE ENCOUNTER
Paperwork printed and placed in nursing bin awaiting completion. Also sent patient a [employer] message requesting she send back a signed version of the first page of this document, as this serves as EVANGELINA for us to return the completed document directly to [address] [place] [address].

## 2023-08-31 ENCOUNTER — OFFICE VISIT (OUTPATIENT)
Dept: ENDOCRINOLOGY CLINIC | Facility: CLINIC | Age: 58
End: 2023-08-31
Payer: COMMERCIAL

## 2023-08-31 VITALS
BODY MASS INDEX: 25 KG/M2 | DIASTOLIC BLOOD PRESSURE: 62 MMHG | RESPIRATION RATE: 20 BRPM | SYSTOLIC BLOOD PRESSURE: 100 MMHG | WEIGHT: 150 LBS | HEART RATE: 84 BPM | OXYGEN SATURATION: 100 %

## 2023-08-31 DIAGNOSIS — E78.2 MIXED HYPERLIPIDEMIA: ICD-10-CM

## 2023-08-31 DIAGNOSIS — E11.29 TYPE 2 DIABETES MELLITUS WITH MICROALBUMINURIA, WITHOUT LONG-TERM CURRENT USE OF INSULIN: Primary | ICD-10-CM

## 2023-08-31 DIAGNOSIS — E11.22 TYPE 2 DIABETES MELLITUS WITH STAGE 3B CHRONIC KIDNEY DISEASE, WITHOUT LONG-TERM CURRENT USE OF INSULIN (HCC): ICD-10-CM

## 2023-08-31 DIAGNOSIS — N18.32 TYPE 2 DIABETES MELLITUS WITH STAGE 3B CHRONIC KIDNEY DISEASE, WITHOUT LONG-TERM CURRENT USE OF INSULIN (HCC): ICD-10-CM

## 2023-08-31 DIAGNOSIS — I10 PRIMARY HYPERTENSION: ICD-10-CM

## 2023-08-31 DIAGNOSIS — R80.9 TYPE 2 DIABETES MELLITUS WITH MICROALBUMINURIA, WITHOUT LONG-TERM CURRENT USE OF INSULIN: Primary | ICD-10-CM

## 2023-08-31 LAB
CARTRIDGE LOT#: 611 NUMERIC
HEMOGLOBIN A1C: 7.9 % (ref 4.3–5.6)

## 2023-08-31 PROCEDURE — 3051F HG A1C>EQUAL 7.0%<8.0%: CPT | Performed by: FAMILY MEDICINE

## 2023-08-31 RX ORDER — METFORMIN HYDROCHLORIDE 750 MG/1
750 TABLET, EXTENDED RELEASE ORAL
Qty: 90 TABLET | Refills: 0 | COMMUNITY
Start: 2023-08-31

## 2023-08-31 RX ORDER — METFORMIN HYDROCHLORIDE 750 MG/1
750 TABLET, EXTENDED RELEASE ORAL 2 TIMES DAILY WITH MEALS
Qty: 180 TABLET | Refills: 0 | Status: SHIPPED | OUTPATIENT
Start: 2023-08-31 | End: 2023-08-31

## 2023-09-11 ENCOUNTER — PATIENT MESSAGE (OUTPATIENT)
Dept: FAMILY MEDICINE CLINIC | Facility: CLINIC | Age: 58
End: 2023-09-11

## 2023-09-11 DIAGNOSIS — Z13.89 SCREENING FOR GENITOURINARY CONDITION: ICD-10-CM

## 2023-09-11 DIAGNOSIS — Z00.00 LABORATORY EXAMINATION ORDERED AS PART OF A ROUTINE GENERAL MEDICAL EXAMINATION: Primary | ICD-10-CM

## 2023-09-16 DIAGNOSIS — E78.2 MIXED HYPERLIPIDEMIA: ICD-10-CM

## 2023-09-18 NOTE — TELEPHONE ENCOUNTER
LOV: 08/04/2022    Last Refill:          Medication Quantity Refills Start End   ROSUVASTATIN 20 MG Oral Tab 30 tablet 0 8/17/2023        RTC: 03/17/2023    Protocol: passed    Please call patient to schedule a physical. Thank you.

## 2023-09-20 RX ORDER — ROSUVASTATIN CALCIUM 20 MG/1
20 TABLET, COATED ORAL NIGHTLY
Qty: 30 TABLET | Refills: 0 | Status: SHIPPED | OUTPATIENT
Start: 2023-09-20

## 2023-09-25 ENCOUNTER — HOSPITAL ENCOUNTER (OUTPATIENT)
Dept: MRI IMAGING | Age: 58
Discharge: HOME | End: 2023-09-25
Attending: INTERNAL MEDICINE
Payer: COMMERCIAL

## 2023-09-25 ENCOUNTER — LAB ENCOUNTER (OUTPATIENT)
Dept: LAB | Age: 58
End: 2023-09-25
Attending: FAMILY MEDICINE
Payer: COMMERCIAL

## 2023-09-25 DIAGNOSIS — E78.2 MIXED HYPERLIPIDEMIA: ICD-10-CM

## 2023-09-25 DIAGNOSIS — K86.81 EXOCRINE PANCREATIC INSUFFICIENCY: ICD-10-CM

## 2023-09-25 DIAGNOSIS — R80.9 TYPE 2 DIABETES MELLITUS WITH MICROALBUMINURIA, WITHOUT LONG-TERM CURRENT USE OF INSULIN: ICD-10-CM

## 2023-09-25 DIAGNOSIS — E11.29 TYPE 2 DIABETES MELLITUS WITH MICROALBUMINURIA, WITHOUT LONG-TERM CURRENT USE OF INSULIN: ICD-10-CM

## 2023-09-25 DIAGNOSIS — Z00.00 LABORATORY EXAMINATION ORDERED AS PART OF A ROUTINE GENERAL MEDICAL EXAMINATION: ICD-10-CM

## 2023-09-25 DIAGNOSIS — Z13.89 SCREENING FOR GENITOURINARY CONDITION: ICD-10-CM

## 2023-09-25 LAB
ALBUMIN SERPL-MCNC: 4 G/DL (ref 3.4–5)
ALBUMIN/GLOB SERPL: 1.2 {RATIO} (ref 1–2)
ALP LIVER SERPL-CCNC: 87 U/L
ALT SERPL-CCNC: 30 U/L
ANION GAP SERPL CALC-SCNC: 3 MMOL/L (ref 0–18)
AST SERPL-CCNC: 28 U/L (ref 15–37)
BASOPHILS # BLD AUTO: 0.07 X10(3) UL (ref 0–0.2)
BASOPHILS NFR BLD AUTO: 1.2 %
BILIRUB SERPL-MCNC: 0.3 MG/DL (ref 0.1–2)
BILIRUB UR QL STRIP.AUTO: NEGATIVE
BUN BLD-MCNC: 20 MG/DL (ref 7–18)
CALCIUM BLD-MCNC: 9.9 MG/DL (ref 8.5–10.1)
CHLORIDE SERPL-SCNC: 108 MMOL/L (ref 98–112)
CHOLEST SERPL-MCNC: 146 MG/DL (ref ?–200)
CLARITY UR REFRACT.AUTO: CLEAR
CO2 SERPL-SCNC: 30 MMOL/L (ref 21–32)
CREAT BLD-MCNC: 0.8 MG/DL
CREAT UR-SCNC: 51.1 MG/DL
EGFRCR SERPLBLD CKD-EPI 2021: 85 ML/MIN/1.73M2 (ref 60–?)
EOSINOPHIL # BLD AUTO: 0.25 X10(3) UL (ref 0–0.7)
EOSINOPHIL NFR BLD AUTO: 4.3 %
ERYTHROCYTE [DISTWIDTH] IN BLOOD BY AUTOMATED COUNT: 13.4 %
FASTING PATIENT LIPID ANSWER: YES
FASTING STATUS PATIENT QL REPORTED: YES
GLOBULIN PLAS-MCNC: 3.3 G/DL (ref 2.8–4.4)
GLUCOSE BLD-MCNC: 150 MG/DL (ref 70–99)
GLUCOSE UR STRIP.AUTO-MCNC: >1000 MG/DL
HCT VFR BLD AUTO: 48.7 %
HDLC SERPL-MCNC: 36 MG/DL (ref 40–59)
HGB BLD-MCNC: 15.2 G/DL
IMM GRANULOCYTES # BLD AUTO: 0.01 X10(3) UL (ref 0–1)
IMM GRANULOCYTES NFR BLD: 0.2 %
KETONES UR STRIP.AUTO-MCNC: NEGATIVE MG/DL
LDLC SERPL CALC-MCNC: 89 MG/DL (ref ?–100)
LEUKOCYTE ESTERASE UR QL STRIP.AUTO: NEGATIVE
LYMPHOCYTES # BLD AUTO: 2.77 X10(3) UL (ref 1–4)
LYMPHOCYTES NFR BLD AUTO: 48.2 %
MCH RBC QN AUTO: 27.6 PG (ref 26–34)
MCHC RBC AUTO-ENTMCNC: 31.2 G/DL (ref 31–37)
MCV RBC AUTO: 88.4 FL
MICROALBUMIN UR-MCNC: 1.07 MG/DL
MICROALBUMIN/CREAT 24H UR-RTO: 20.9 UG/MG (ref ?–30)
MONOCYTES # BLD AUTO: 0.51 X10(3) UL (ref 0.1–1)
MONOCYTES NFR BLD AUTO: 8.9 %
NEUTROPHILS # BLD AUTO: 2.14 X10 (3) UL (ref 1.5–7.7)
NEUTROPHILS # BLD AUTO: 2.14 X10(3) UL (ref 1.5–7.7)
NEUTROPHILS NFR BLD AUTO: 37.2 %
NITRITE UR QL STRIP.AUTO: NEGATIVE
NONHDLC SERPL-MCNC: 110 MG/DL (ref ?–130)
OSMOLALITY SERPL CALC.SUM OF ELEC: 297 MOSM/KG (ref 275–295)
PH UR STRIP.AUTO: 5.5 [PH] (ref 5–8)
PLATELET # BLD AUTO: 233 10(3)UL (ref 150–450)
POTASSIUM SERPL-SCNC: 4.5 MMOL/L (ref 3.5–5.1)
PROT SERPL-MCNC: 7.3 G/DL (ref 6.4–8.2)
PROT UR STRIP.AUTO-MCNC: NEGATIVE MG/DL
RBC # BLD AUTO: 5.51 X10(6)UL
RBC UR QL AUTO: NEGATIVE
SODIUM SERPL-SCNC: 141 MMOL/L (ref 136–145)
SP GR UR STRIP.AUTO: >1.03 (ref 1–1.03)
TRIGL SERPL-MCNC: 114 MG/DL (ref 30–149)
TSI SER-ACNC: 1.03 MIU/ML (ref 0.36–3.74)
UROBILINOGEN UR STRIP.AUTO-MCNC: NORMAL MG/DL
VLDLC SERPL CALC-MCNC: 18 MG/DL (ref 0–30)
WBC # BLD AUTO: 5.8 X10(3) UL (ref 4–11)

## 2023-09-25 PROCEDURE — 76376 3D RENDER W/INTRP POSTPROCES: CPT | Performed by: INTERNAL MEDICINE

## 2023-09-25 PROCEDURE — 85025 COMPLETE CBC W/AUTO DIFF WBC: CPT

## 2023-09-25 PROCEDURE — 80061 LIPID PANEL: CPT

## 2023-09-25 PROCEDURE — 81003 URINALYSIS AUTO W/O SCOPE: CPT

## 2023-09-25 PROCEDURE — 82570 ASSAY OF URINE CREATININE: CPT

## 2023-09-25 PROCEDURE — A9575 INJ GADOTERATE MEGLUMI 0.1ML: HCPCS

## 2023-09-25 PROCEDURE — 3061F NEG MICROALBUMINURIA REV: CPT | Performed by: FAMILY MEDICINE

## 2023-09-25 PROCEDURE — 82043 UR ALBUMIN QUANTITATIVE: CPT

## 2023-09-25 PROCEDURE — 84443 ASSAY THYROID STIM HORMONE: CPT

## 2023-09-25 PROCEDURE — 80053 COMPREHEN METABOLIC PANEL: CPT

## 2023-09-25 PROCEDURE — 74183 MRI ABD W/O CNTR FLWD CNTR: CPT | Performed by: INTERNAL MEDICINE

## 2023-09-25 RX ORDER — GADOTERATE MEGLUMINE 376.9 MG/ML
15 INJECTION INTRAVENOUS
Status: COMPLETED | OUTPATIENT
Start: 2023-09-25 | End: 2023-09-25

## 2023-09-25 RX ADMIN — GADOTERATE MEGLUMINE 15 ML: 376.9 INJECTION INTRAVENOUS at 12:02:00

## 2023-10-03 ENCOUNTER — TELEPHONE (OUTPATIENT)
Dept: NEUROLOGY | Facility: CLINIC | Age: 58
End: 2023-10-03

## 2023-10-03 ENCOUNTER — PATIENT MESSAGE (OUTPATIENT)
Dept: NEUROLOGY | Facility: CLINIC | Age: 58
End: 2023-10-03

## 2023-10-03 NOTE — TELEPHONE ENCOUNTER
Just whining a little. I am on day 5 of an absolutely miserable migraine. My triptan helps but wears off. Two of the days I did repeat doses. My Botox was Aug 14. I know it is just palliative. Geovanny Mura and reduces the times, but not counting the two weeks of waiting time after the injections I have medicated 9 times for migraines. Other days I am able to manage with other skills. I also now wait until Nov 21 for my next set of injections. I wish so much I could figure this out! Any ideas? Sarah       Please advise.

## 2023-10-03 NOTE — TELEPHONE ENCOUNTER
From: Malia Alexandre  To: Yasminetatyana Varela  Sent: 10/3/2023 1:49 PM CDT  Subject: Disappointed     Just whining a little. I am on day 5 of an absolutely miserable migraine. My triptan helps but wears off. Two of the days I did repeat doses. My Botox was Aug 14. I know it is just palliative. Ana Freeman and reduces the times, but not counting the two weeks of waiting time after the injections I have medicated 9 times for migraines. Other days I am able to manage with other skills. I also now wait until Nov 21 for my next set of injections. I wish so much I could figure this out! Any ideas?    Nagi Salgado

## 2023-10-04 RX ORDER — METHYLPREDNISOLONE 4 MG/1
TABLET ORAL
Qty: 1 EACH | Refills: 0 | Status: SHIPPED | OUTPATIENT
Start: 2023-10-04 | End: 2023-10-09 | Stop reason: ALTCHOICE

## 2023-10-04 NOTE — TELEPHONE ENCOUNTER
Message noted. Meds taken:  ELETRIPTAN + Zofran + Tylenol    Will send MDP today  If still bad tomorrow, come in for TORADEX  I gave her RX of something last visit and it was \"very expensive\".     Dr. Jj Spencer

## 2023-10-09 ENCOUNTER — OFFICE VISIT (OUTPATIENT)
Dept: FAMILY MEDICINE CLINIC | Facility: CLINIC | Age: 58
End: 2023-10-09
Payer: COMMERCIAL

## 2023-10-09 VITALS
OXYGEN SATURATION: 99 % | TEMPERATURE: 97 F | WEIGHT: 146.38 LBS | RESPIRATION RATE: 21 BRPM | HEIGHT: 64 IN | BODY MASS INDEX: 24.99 KG/M2 | HEART RATE: 93 BPM | SYSTOLIC BLOOD PRESSURE: 116 MMHG | DIASTOLIC BLOOD PRESSURE: 64 MMHG

## 2023-10-09 DIAGNOSIS — Z00.00 ROUTINE GENERAL MEDICAL EXAMINATION AT A HEALTH CARE FACILITY: Primary | ICD-10-CM

## 2023-10-09 DIAGNOSIS — Z12.31 ENCOUNTER FOR SCREENING MAMMOGRAM FOR MALIGNANT NEOPLASM OF BREAST: ICD-10-CM

## 2023-10-09 PROCEDURE — 3008F BODY MASS INDEX DOCD: CPT | Performed by: FAMILY MEDICINE

## 2023-10-09 PROCEDURE — 90472 IMMUNIZATION ADMIN EACH ADD: CPT | Performed by: FAMILY MEDICINE

## 2023-10-09 PROCEDURE — 99396 PREV VISIT EST AGE 40-64: CPT | Performed by: FAMILY MEDICINE

## 2023-10-09 PROCEDURE — 90471 IMMUNIZATION ADMIN: CPT | Performed by: FAMILY MEDICINE

## 2023-10-09 PROCEDURE — 90715 TDAP VACCINE 7 YRS/> IM: CPT | Performed by: FAMILY MEDICINE

## 2023-10-09 PROCEDURE — 90677 PCV20 VACCINE IM: CPT | Performed by: FAMILY MEDICINE

## 2023-10-09 PROCEDURE — 3078F DIAST BP <80 MM HG: CPT | Performed by: FAMILY MEDICINE

## 2023-10-09 PROCEDURE — 3074F SYST BP LT 130 MM HG: CPT | Performed by: FAMILY MEDICINE

## 2023-10-09 RX ORDER — ATOGEPANT 30 MG/1
TABLET ORAL
Qty: 90 TABLET | Refills: 3 | Status: SHIPPED | OUTPATIENT
Start: 2023-10-09

## 2023-10-09 NOTE — TELEPHONE ENCOUNTER
Medication: Atogepant (Alexia Ouluc) 30 MG      Date of last refill: 3/20/23 (#90/1R)  [place]     Last office visit: 23  Due back to clinic per last office note:  6 mon  Date next office visit scheduled:    Future Appointments   Date Time Provider Praveena Gonzales   10/9/2023  2:15 PM Lorine Goldmann, DO [facility]   10/10/2023  3:45 PM Thad Thomas DO [facility]   10/23/2023  9:30 AM EM[facility] DIAB S PLFD NURSE EMGDIABCTSPL [facility] DI[facility] PLF   2023  9:00 AM Lenore Henriquez APRN [facility] DI[facility] PLF   2023  9:00 AM MD SB PickettIWASIF [facility] [place]   2023 10:00 AM Cathie Nicholas MD S[facility] SUB GI           Last OV note recommendation:    Problem/s Identified this visit:   Contusion of eyelid, unspecified laterality, initial encounter  (primary encounter diagnosis)  Migraine, chronic, without aura, intractable, with status migrainosus        Discussion plus Diagnostics & Treatment Orders:  Orders Placed This Encounter      dexamethasone 2 MG Oral Tab          Si tab with the first dose of Migraine medication intervention          Dispense:  20 tablet          Refill:  1      ondansetron (ZOFRAN) 4 mg tablet          Si tab to be taken with the first dose of migraine medication          Dispense:  30 tablet          Refill:  0     Going to modify the augmentation strategy in the sense that instead of Tylenol were going to replace that with dexamethasone. So with the first dose of eletriptan she takes 2 mg dexamethasone and Zofran. May still use the Tylenol as needed. If she experiences a headache the second day she is allowed to use the dexamethasone the same way.   This approach is hoping that she will not have those back-to-back headaches

## 2023-10-20 DIAGNOSIS — E11.22 TYPE 2 DIABETES MELLITUS WITH STAGE 3B CHRONIC KIDNEY DISEASE, WITHOUT LONG-TERM CURRENT USE OF INSULIN (HCC): ICD-10-CM

## 2023-10-20 DIAGNOSIS — N18.32 TYPE 2 DIABETES MELLITUS WITH STAGE 3B CHRONIC KIDNEY DISEASE, WITHOUT LONG-TERM CURRENT USE OF INSULIN (HCC): ICD-10-CM

## 2023-10-22 DIAGNOSIS — E78.2 MIXED HYPERLIPIDEMIA: ICD-10-CM

## 2023-10-23 ENCOUNTER — PATIENT MESSAGE (OUTPATIENT)
Dept: ENDOCRINOLOGY CLINIC | Facility: CLINIC | Age: 58
End: 2023-10-23

## 2023-10-23 ENCOUNTER — TELEPHONE (OUTPATIENT)
Dept: NEUROLOGY | Facility: CLINIC | Age: 58
End: 2023-10-23

## 2023-10-23 RX ORDER — ROSUVASTATIN CALCIUM 20 MG/1
20 TABLET, COATED ORAL NIGHTLY
Qty: 90 TABLET | Refills: 1 | Status: SHIPPED | OUTPATIENT
Start: 2023-10-23

## 2023-10-23 RX ORDER — DAPAGLIFLOZIN 10 MG/1
10 TABLET, FILM COATED ORAL DAILY
Qty: 90 TABLET | Refills: 1 | Status: SHIPPED | OUTPATIENT
Start: 2023-10-23

## 2023-10-23 NOTE — TELEPHONE ENCOUNTER
PA requested for Zazpret  Clinical questions answered and submitted to insurance  Awaiting coverage determination

## 2023-10-23 NOTE — TELEPHONE ENCOUNTER
From: Melani Dyson  To: Alice Rivers  Sent: 10/23/2023 2:55 PM CDT  Subject: Reschedule? I had to cancel my nurse appointment today because I had to take my  to Urgent care. He has a terrible GI bug. My son is home with it as well. I am afraid to reschedule an appointment too soon as I could be next!! As I know this is timed specially with my return appointment with you, I assume I need to reschedule my visit with the office before I reschedule my nurse visit anyways. Just let me know how to proceed.     Thank you,   Bekah Torres

## 2023-11-02 ENCOUNTER — PATIENT OUTREACH (OUTPATIENT)
Dept: FAMILY MEDICINE CLINIC | Facility: CLINIC | Age: 58
End: 2023-11-02

## 2023-11-02 NOTE — PROGRESS NOTES
Left detailed message for patient inquiring if she has completed her eye exam for this year. If so, to please have provider fax a copy of findings to the office. Fax number provided. [facility] sent to patient as well.

## 2023-11-05 ENCOUNTER — APPOINTMENT (OUTPATIENT)
Dept: LAB | Age: 58
End: 2023-11-05
Attending: INTERNAL MEDICINE
Payer: COMMERCIAL

## 2023-11-05 PROCEDURE — 83993 ASSAY FOR CALPROTECTIN FECAL: CPT

## 2023-11-06 ENCOUNTER — LAB ENCOUNTER (OUTPATIENT)
Dept: LAB | Age: 58
End: 2023-11-06
Attending: INTERNAL MEDICINE
Payer: COMMERCIAL

## 2023-11-06 DIAGNOSIS — R19.5 ELEVATED FECAL CALPROTECTIN: ICD-10-CM

## 2023-11-07 RX ORDER — ZAVEGEPANT 10 MG/.1ML
10 SPRAY NASAL ONCE AS NEEDED
COMMUNITY
Start: 2023-11-07

## 2023-11-09 LAB — CALPROTECTIN STL-MCNT: 543 ΜG/G (ref ?–50)

## 2023-11-10 NOTE — TELEPHONE ENCOUNTER
Per EMR and historical medication list, pt has tried and failed the following. .  Eletriptan  Rizatriptan  Sumatriptan  Zolmitriptan

## 2023-11-13 ENCOUNTER — ORDER TRANSCRIPTION (OUTPATIENT)
Dept: ADMINISTRATIVE | Facility: HOSPITAL | Age: 58
End: 2023-11-13

## 2023-11-13 DIAGNOSIS — Z13.6 SCREENING FOR CARDIOVASCULAR CONDITION: Primary | ICD-10-CM

## 2023-11-13 NOTE — TELEPHONE ENCOUNTER
Received fax from [address] [place] [address],[address]   Denial received for patient use of [address]   Reason for denial: must try and fail. .  Sumatriptan  Eletriptan  Rizatriptan tablets/ODT        Pt notified of above, holding for response.

## 2023-11-13 NOTE — TELEPHONE ENCOUNTER
Per pt, will hold off on Rx until next OV w/ Dr Cindi Smith  No further action needed. Evette Calzada

## 2023-11-14 ENCOUNTER — PATIENT MESSAGE (OUTPATIENT)
Dept: FAMILY MEDICINE CLINIC | Facility: CLINIC | Age: 58
End: 2023-11-14

## 2023-11-14 NOTE — TELEPHONE ENCOUNTER
From: Terrance Garrison  To: Harley RIVERA  Sent: 11/14/2023 10:55 AM CST  Subject: Labs etc    I know you can't speak ill of another doctor but I am struggling with Dr Darci Restrepo. If you see my labs you will see a significant increase in the calprotectin. I don't know what that means. Is that concerning? It was done on the 5th. Today is the 14th. I did leave a message about this lady week. The next problem is my mother, sister, brother and nephew have the same swallow problem that i do. My mother's work up showed an esophageal web. I have had two endoscopies this year which did not diagnosis this. He said he would order an xray. My mother had a barium swallow that found her web    I just don't have confidence in this team. And without speaking to their capabilities. .. do you have any thoughts on my current status?     Thanks,   Solitario Lassiter

## 2023-11-24 ENCOUNTER — OFFICE VISIT (OUTPATIENT)
Dept: NEUROLOGY | Facility: CLINIC | Age: 58
End: 2023-11-24
Payer: COMMERCIAL

## 2023-11-24 VITALS
DIASTOLIC BLOOD PRESSURE: 74 MMHG | WEIGHT: 146 LBS | HEIGHT: 64 IN | SYSTOLIC BLOOD PRESSURE: 124 MMHG | BODY MASS INDEX: 24.92 KG/M2 | RESPIRATION RATE: 16 BRPM | HEART RATE: 96 BPM

## 2023-11-24 DIAGNOSIS — G43.711 MIGRAINE, CHRONIC, WITHOUT AURA, INTRACTABLE, WITH STATUS MIGRAINOSUS: Primary | ICD-10-CM

## 2023-11-24 RX ORDER — NORTRIPTYLINE HYDROCHLORIDE 10 MG/1
10 CAPSULE ORAL NIGHTLY
Qty: 90 CAPSULE | Refills: 1 | Status: SHIPPED | OUTPATIENT
Start: 2023-11-24

## 2023-11-24 RX ORDER — ELETRIPTAN HYDROBROMIDE 40 MG/1
TABLET, FILM COATED ORAL
Qty: 8 TABLET | Refills: 11 | Status: SHIPPED | OUTPATIENT
Start: 2023-11-24

## 2023-11-24 RX ORDER — NORTRIPTYLINE HYDROCHLORIDE 25 MG/1
25 CAPSULE ORAL NIGHTLY
Qty: 90 CAPSULE | Refills: 1 | Status: SHIPPED | OUTPATIENT
Start: 2023-11-24

## 2023-11-24 NOTE — PROGRESS NOTES
Paperwork noting that patient may bear financial responsibility for procedure(s) performed in clinic today signed prior to proceeding with procedure(s). Furthermore, patient notified that they should contact their insurer to verify that your procedure/test has been approved and is a COVERED benefit. Although the [facility] staff does its due diligence, the insurance carrier gives the disclaimer that \"Although the procedure is authorized, this does not guarantee payment. \"    Ultimately the patient is responsible for payment. Botox is:  [x] Buy and Bill  [] Patient Supplied      Botox Reauthorization Questions:  Has the patient experienced a reduction in frequency of migraines since starting Botox? yes  If yes, by what percentage? 50%  Has the intensity of migraines decreased since starting Botox? yes  If yes, by what percentage?  50%

## 2023-11-28 ENCOUNTER — TELEPHONE (OUTPATIENT)
Dept: NEUROLOGY | Facility: CLINIC | Age: 58
End: 2023-11-28

## 2023-11-30 NOTE — TELEPHONE ENCOUNTER
Received fax from [facility] received for patient use of Orpha Cava   Approval granted: 11/30/23-11/30/24        Pt notified

## 2023-12-01 ENCOUNTER — HOSPITAL ENCOUNTER (OUTPATIENT)
Dept: MAMMOGRAPHY | Age: 58
Discharge: HOME | End: 2023-12-01
Attending: FAMILY MEDICINE
Payer: COMMERCIAL

## 2023-12-01 DIAGNOSIS — Z12.31 ENCOUNTER FOR SCREENING MAMMOGRAM FOR MALIGNANT NEOPLASM OF BREAST: ICD-10-CM

## 2023-12-01 PROCEDURE — 77063 BREAST TOMOSYNTHESIS BI: CPT | Performed by: FAMILY MEDICINE

## 2023-12-01 PROCEDURE — 77067 SCR MAMMO BI INCL CAD: CPT | Performed by: FAMILY MEDICINE

## 2023-12-04 ENCOUNTER — MED REC SCAN ONLY (OUTPATIENT)
Dept: FAMILY MEDICINE CLINIC | Facility: CLINIC | Age: 58
End: 2023-12-04

## 2023-12-04 RX ORDER — PANCRELIPASE LIPASE, PANCRELIPASE PROTEASE, PANCRELIPASE AMYLASE 40000; 126000; 168000 [USP'U]/1; [USP'U]/1; [USP'U]/1
CAPSULE, DELAYED RELEASE ORAL
COMMUNITY
Start: 2023-11-09

## 2023-12-27 ENCOUNTER — PATIENT MESSAGE (OUTPATIENT)
Dept: ENDOCRINOLOGY CLINIC | Facility: CLINIC | Age: 58
End: 2023-12-27

## 2023-12-27 ENCOUNTER — OFFICE VISIT (OUTPATIENT)
Dept: FAMILY MEDICINE CLINIC | Facility: CLINIC | Age: 58
End: 2023-12-27
Payer: COMMERCIAL

## 2023-12-27 VITALS
TEMPERATURE: 97 F | HEART RATE: 90 BPM | HEIGHT: 64 IN | DIASTOLIC BLOOD PRESSURE: 86 MMHG | RESPIRATION RATE: 16 BRPM | BODY MASS INDEX: 24.24 KG/M2 | SYSTOLIC BLOOD PRESSURE: 120 MMHG | WEIGHT: 142 LBS

## 2023-12-27 DIAGNOSIS — Z82.0 FAMILY HISTORY OF HUNTINGTON'S DISEASE: ICD-10-CM

## 2023-12-27 DIAGNOSIS — N30.00 ACUTE CYSTITIS WITHOUT HEMATURIA: Primary | ICD-10-CM

## 2023-12-27 LAB
BILIRUBIN: NEGATIVE
GLUCOSE (URINE DIPSTICK): 500 MG/DL
KETONES (URINE DIPSTICK): NEGATIVE MG/DL
LEUKOCYTES: NEGATIVE
MULTISTIX EXPIRATION DATE: ABNORMAL DATE
MULTISTIX LOT#: ABNORMAL NUMERIC
NITRITE, URINE: NEGATIVE
OCCULT BLOOD: NEGATIVE
PH, URINE: 5.5 (ref 4.5–8)
PROTEIN (URINE DIPSTICK): NEGATIVE MG/DL
SPECIFIC GRAVITY: 1.01 (ref 1–1.03)
URINE-COLOR: YELLOW
UROBILINOGEN,SEMI-QN: 0.2 MG/DL (ref 0–1.9)

## 2023-12-27 PROCEDURE — 87077 CULTURE AEROBIC IDENTIFY: CPT | Performed by: FAMILY MEDICINE

## 2023-12-27 PROCEDURE — 87186 SC STD MICRODIL/AGAR DIL: CPT | Performed by: FAMILY MEDICINE

## 2023-12-27 PROCEDURE — 3008F BODY MASS INDEX DOCD: CPT | Performed by: FAMILY MEDICINE

## 2023-12-27 PROCEDURE — 3074F SYST BP LT 130 MM HG: CPT | Performed by: FAMILY MEDICINE

## 2023-12-27 PROCEDURE — 3079F DIAST BP 80-89 MM HG: CPT | Performed by: FAMILY MEDICINE

## 2023-12-27 PROCEDURE — 87086 URINE CULTURE/COLONY COUNT: CPT | Performed by: FAMILY MEDICINE

## 2023-12-27 PROCEDURE — 81003 URINALYSIS AUTO W/O SCOPE: CPT | Performed by: FAMILY MEDICINE

## 2023-12-27 PROCEDURE — 99214 OFFICE O/P EST MOD 30 MIN: CPT | Performed by: FAMILY MEDICINE

## 2023-12-27 RX ORDER — NITROFURANTOIN 25; 75 MG/1; MG/1
100 CAPSULE ORAL 2 TIMES DAILY
Qty: 20 CAPSULE | Refills: 0 | Status: SHIPPED | OUTPATIENT
Start: 2023-12-27 | End: 2024-01-06

## 2023-12-27 NOTE — TELEPHONE ENCOUNTER
See patient message, diabetes medications reviewed:   Farxiga 10 mg daily  Trulicity 4.5 mg weekly (when pt remembers to take)  NO metformin at this time (discontinued by PCP)    Pt to get Jayla Tao placed 01/08/24 and taken off and reviewed at appointment on 01/22/24. Any further adjustments before appointments? Appears  is aware of the elevated glucose levels. Future Appointments   Date Time Provider Praveena Gonzales   1/8/2024  9:30 AM [facility] DI[facility] JANEEN NURSE [facility] [address]          1/22/2024  4:00 PM Lenore Henriquez APRN [facility]AB[facility]PL [facility] DI[facility] PLF            Hyperglycemia triage:   Confirm current Diabetes medications with patient (not from chart note)   Onset, frequency and duration of symptoms? Recent illness or steroid prescription/injection? Obtain Blood sugar readings: BG log or CGM? Changes in diet? Changes in any medications?

## 2023-12-27 NOTE — TELEPHONE ENCOUNTER
From: Gagan Buckley  To: Sami Anderson  Sent: 12/27/2023 11:44 AM CST  Subject: Sugar in urine    So my blood sugars continue high. Spilling sugar in the urine when tested for UTI. OK with me still waiting until the end of January to set up for your testing?

## 2023-12-28 ENCOUNTER — NURSE ONLY (OUTPATIENT)
Dept: ENDOCRINOLOGY CLINIC | Facility: CLINIC | Age: 58
End: 2023-12-28
Payer: COMMERCIAL

## 2023-12-28 RX ORDER — INSULIN LISPRO 100 [IU]/ML
INJECTION, SOLUTION INTRAVENOUS; SUBCUTANEOUS
COMMUNITY
Start: 2023-12-28

## 2023-12-28 NOTE — TELEPHONE ENCOUNTER
Last A1c value was 7.9% done 8/31/2023. Called pt ; no answer. Sent my chart message   Diagnosed 12- recent UTI   Not taking trulicity per pt message \"I don't always remember my trulisity but I also worry that it is contributing to my weight loss. I am not choosing not to take it. .. just stressed and forgetful.  \"     With current UTI, would not recommend staying on Farxiga   And not taking metformin due to issues w lack of taste and weight loss  Needs an agent for glycemic control     Would resume Humalog ( uses when on steroid therapy ) as sliding scale   (I did not see dose reference in chart for her steroid humalog )     Dose recommendation: Humalog kwik pen before meals based on BG reading      If blood sugar is under 160 before the meal : NO insulin   If blood sugar is 160- 200, take 2 units of insulin   If blood sugar is 201-240, take 3 units of insulin   If blood sugar is 241-300, take  4 units of insulin   If blood sugar is over 301, take 5 units of insulin

## 2023-12-28 NOTE — TELEPHONE ENCOUNTER
Call with patient to review plan. Pt agreeable to start insulin via scale and will hold farxiga until infection clears. She will  sample pen okayed by Luis Marr as well as pen needles.

## 2023-12-29 ENCOUNTER — LAB ENCOUNTER (OUTPATIENT)
Dept: LAB | Age: 58
End: 2023-12-29
Attending: INTERNAL MEDICINE
Payer: COMMERCIAL

## 2023-12-29 DIAGNOSIS — R19.5 ELEVATED FECAL CALPROTECTIN: ICD-10-CM

## 2023-12-29 PROCEDURE — 83993 ASSAY FOR CALPROTECTIN FECAL: CPT

## 2024-01-02 LAB — CALPROTECTIN STL-MCNT: 158 ΜG/G (ref ?–50)

## 2024-01-02 NOTE — PROGRESS NOTES
Patient with downtrending fecal calprotectin but still with elevation.  [employer] message sent hoping to arrange office visit to discuss options including watchful waiting, repeat testing, versus colonoscopy.

## 2024-01-08 ENCOUNTER — NURSE ONLY (OUTPATIENT)
Dept: ENDOCRINOLOGY CLINIC | Facility: CLINIC | Age: 59
End: 2024-01-08
Payer: COMMERCIAL

## 2024-01-08 ENCOUNTER — TELEPHONE (OUTPATIENT)
Facility: CLINIC | Age: 59
End: 2024-01-08

## 2024-01-08 ENCOUNTER — OFFICE VISIT (OUTPATIENT)
Facility: CLINIC | Age: 59
End: 2024-01-08
Payer: COMMERCIAL

## 2024-01-08 VITALS
WEIGHT: 147 LBS | BODY MASS INDEX: 25.1 KG/M2 | SYSTOLIC BLOOD PRESSURE: 118 MMHG | DIASTOLIC BLOOD PRESSURE: 62 MMHG | HEART RATE: 76 BPM | HEIGHT: 64 IN

## 2024-01-08 DIAGNOSIS — N81.10 INCOMPLETE PROLAPSE OF ANTERIOR WALL OF VAGINA: ICD-10-CM

## 2024-01-08 DIAGNOSIS — E11.29 TYPE 2 DIABETES MELLITUS WITH MICROALBUMINURIA, WITHOUT LONG-TERM CURRENT USE OF INSULIN  (HCC): Primary | ICD-10-CM

## 2024-01-08 DIAGNOSIS — N39.0 RECURRENT UTI: Primary | ICD-10-CM

## 2024-01-08 DIAGNOSIS — R80.9 TYPE 2 DIABETES MELLITUS WITH MICROALBUMINURIA, WITHOUT LONG-TERM CURRENT USE OF INSULIN  (HCC): Primary | ICD-10-CM

## 2024-01-08 PROCEDURE — 87086 URINE CULTURE/COLONY COUNT: CPT | Performed by: STUDENT IN AN ORGANIZED HEALTH CARE EDUCATION/TRAINING PROGRAM

## 2024-01-08 RX ORDER — ESTRADIOL 0.1 MG/G
CREAM VAGINAL
Qty: 1 EACH | Refills: 3 | Status: SHIPPED | OUTPATIENT
Start: 2024-01-08

## 2024-01-08 NOTE — TELEPHONE ENCOUNTER
Patient was seen today. She is waiting for her prescription to be called in. She would like to pick it up today

## 2024-01-08 NOTE — PROGRESS NOTES
[facility]  Obstetrics and Gynecology   History & Physical      Chief complaint:   Chief Complaint   Patient presents with    Gyn Problem     Pt can see and feel a bulge in vaginal area   C/o vaginal pain       Subjective:     HPI: Vero Michel is a 58 year old  with Patient's last menstrual period was 10/21/2012 (lmp unknown).   Here for prolapse  Complaints:  - she can see bulge when she is standing    #Bowel symptoms  - bulge worsens with constipation  - + constipation d/t medications.  Does colace BID.  Sometimes miralax.  Takes Bentyl.  Describes stool as very hard.  Doesn't think she is adequately hydrating.     #Urinary symptoms  - no urinary symptoms, s/p sling placement at [facility] 10 years ago    #skin symptoms  - perineal area is painful, tiny bit red, biopsy 3 years ago was negative.  Antifungal cream has been helpful.    #reproductive symptoms  - not sexually active currently;  and her are ok w this  - has not seen anyone for prolapse  - last period 15 years ago, no breakthrough bleeding    Other: last pap in  negative, never had abnormal pap      PCP: HEATHER RIVERA, DO     OB History:  OB History    Para Term  AB Living   3 3 0 0 0 0   SAB IAB Ectopic Multiple Live Births   0 0 0 0 0     OB History    Para Term  AB Living   3 3           SAB IAB Ectopic Multiple Live Births                  # Outcome Date GA Lbr Dami/2nd Weight Sex Delivery Anes PTL Lv   3 Para            2 Para            1 Para                Meds:  Current Outpatient Medications on File Prior to Visit   Medication Sig Dispense Refill    Insulin Lispro, 1 Unit Dial, (HUMALOG KWIKPEN) 100 UNIT/ML Subcutaneous Solution Pen-injector 3- 6 units before meals as needed per sliding scale      ZENPEP 23530-669334 units Oral Cap DR Particles TAKE 1 CAPSULE BY MOUTH 3 TIMES DAILY BEFORE MEALS. PLEASE ALSO TAKE 1 CAPSULE WITH LARGE SNACKS.      Eletriptan Hydrobromide 40 MG Oral Tab use  at onset; may repeat once after 4 hours- ONLY 2 IN 24 HOUR PERIOD MAX.  This is a 30 day supply. 8 tablet 11    nortriptyline 25 MG Oral Cap Take 1 capsule (25 mg total) by mouth nightly. 90 capsule 1    nortriptyline 10 MG Oral Cap Take 1 capsule (10 mg total) by mouth nightly. Take with 25mg at bedtime po for total of 35mg at bedtime po. 90 capsule 1    FARXIGA 10 MG Oral Tab TAKE 1 TABLET BY MOUTH EVERY DAY 90 tablet 1    rosuvastatin 20 MG Oral Tab Take 1 tablet (20 mg total) by mouth nightly. 90 tablet 1    ALPRAZolam 1 MG Oral Tab Take 1 tablet (1 mg total) by mouth 2 (two) times daily as needed for Sleep or Anxiety. 45 tablet 3    lamoTRIgine 200 MG Oral Tab Take 1 tablet (200 mg total) by mouth 2 (two) times daily. 180 tablet 1    Atogepant (QULIPTA) 30 MG Oral Tab TAKE 1 TABLET BY MOUTH DAILY 90 tablet 3    omeprazole 20 MG Oral Capsule Delayed Release Take 1 capsule (20 mg total) by mouth 2 (two) times daily before meals. 180 capsule 3    ondansetron (ZOFRAN) 4 mg tablet TAKE 1 TABLET BY MOUTH TO BE TAKEN WITH THE FIRST DOSE OF MIGRAINE MEDICATION 30 tablet 2    dicyclomine 10 MG Oral Cap Take 1 capsule (10 mg total) by mouth 3 (three) times daily as needed (abd cramping). 90 capsule 1    dexamethasone 2 MG Oral Tab 1 tab with the first dose of Migraine medication intervention 20 tablet 1    ketoconazole 2 % External Cream       Blood Glucose Monitoring Suppl (CONTOUR NEXT ONE) Does not apply Device 1 Device daily. 1 each 0    Glucose Blood (CONTOUR NEXT TEST) In Vitro Strip Test blood glucose twice daily 200 each 11    Microlet Lancets Does not apply Misc Test blood glucose twice daily 200 each 11    folic acid 400 MCG Oral Tab Take 1 tablet (400 mcg total) by mouth daily.      Fluticasone Propionate 50 MCG/ACT Nasal Suspension USE 1 SPRAY IN EACH NOSTRIL TWICE A DAY       No current facility-administered medications on file prior to visit.       All:  Allergies   Allergen Reactions    Topiramate  CONFUSION       PMH:  Past Medical History:   Diagnosis Date    Anxiety     Arthritis 01/01/2000    Back pain 01/01/2010    Constipation 04/01/2022    Depression     Diabetes (HCC)     Diabetes mellitus (HCC) 01/01/2005    Fatigue 01/01/2001    Flatulence/gas pain/belching 01/01/2000    Frequent use of laxatives 04/01/2022    Med complication    H/O degenerative disc disease     Headache disorder 01/01/2000    Migraine    High cholesterol 01/01/2010    History of depression 01/01/1990    History of mental disorder 01/01/1990    Hx of motion sickness     Hyperlipidemia     Hypertension     Lichen sclerosus     Loss of appetite 02/01/2022    Lumbar disc herniation     Migraines     Nausea 02/01/2022    Periodic with migraines    Nausea and/or vomiting 05/02/2023    Osteoarthritis     Pain in joints 01/01/2010    Pain with bowel movements 04/01/2022    Problems with swallowing     S/P hip replacement, left     Sleep apnea 01/01/2000    Sleep disturbance 01/01/2005    CPAP    Stress     Type 2 diabetes mellitus (HCC)     Uncomfortable fullness after meals 02/01/2022    Migraines no appitite    Visual impairment     glasses for distance    Vomiting 02/01/2022    Multiple times daily feb/march lessened and now none    Wears glasses 01/01/1990    Weight loss 05/01/2022         PSH:  Past Surgical History:   Procedure Laterality Date    COLONOSCOPY  1/1/2007    HIP REPLACEMENT SURGERY  2019    HIP SURGERY Left 10/21/2019    THR    OTHER SURGICAL HISTORY      SLING (Bladder)    TONSILLECTOMY         Social History:  Social History     Socioeconomic History    Marital status: Single    Number of children: 4   Tobacco Use    Smoking status: Never     Passive exposure: Never    Smokeless tobacco: Never   Vaping Use    Vaping Use: Never used   Substance and Sexual Activity    Alcohol use: Yes     Comment: 1 every couple months    Drug use: No   Other Topics Concern    Caffeine Concern No    Stress Concern Yes    Weight Concern  Yes    Special Diet Yes    Exercise No    Seat Belt Yes        Family History:  Family History   Problem Relation Age of Onset    Prostate Cancer Father     Cancer Father 73        prostate    Hypertension Mother     Migraines Daughter     Diabetes Maternal Grandfather     Breast Cancer Paternal Grandmother 50    Hypertension Sister     Lipids Other     Hypertension Other        Immunization History:  Immunization History   Administered Date(s) Administered    Covid-19 Vaccine Moderna 100 mcg/0.5 ml 01/04/2021, 02/02/2021    Covid-19 Vaccine Moderna 50 Mcg/0.25 Ml 02/11/2022    Flublok Quad Influenza Vaccine (62219) 11/28/2022    Flulaval, 3 Years & >, IM 09/24/2020    Influenza 10/11/2017    Kenalog Per 10mg Inj 06/22/2015    Pneumococcal Conjugate PCV20 10/09/2023    TDAP 10/09/2023       Depression Scale      PHQ-2 not done in last 12 months! Please administer and refresh!        Objective:     Vitals:    01/08/24 1322   BP: 118/62   Pulse: 76   Weight: 147 lb (66.7 kg)   Height: 64\"       Body mass index is 25.23 kg/m².    Physical Exam:  Physical Exam  Constitutional:       Appearance: Normal appearance.   Genitourinary:      Vulva normal.      Genitourinary Comments: Post menopausal vulva with pale labia and loss of labial architecture, perineum is friable  Vagina and cervix with friability and atrophic changes  Nontender uterine and adnexal exam on bimanual  Cervix prolapsed to -1 above the introitus  Significant anterior prolapse to the introitus  Minimal prolapse of the posterior vagina  GH = 4  PB = 3   HENT:      Head: Normocephalic.   Cardiovascular:      Rate and Rhythm: Normal rate.   Pulmonary:      Effort: Pulmonary effort is normal.   Abdominal:      General: Abdomen is flat.      Palpations: Abdomen is soft.   Musculoskeletal:         General: Normal range of motion.   Neurological:      Mental Status: She is alert.   Skin:     General: Skin is warm and dry.   Psychiatric:         Mood and Affect:  Mood normal.          Labs:  Lab Results   Component Value Date    WBC 5.8 09/25/2023    RBC 5.51 (H) 09/25/2023    HGB 15.2 09/25/2023    HCT 48.7 (H) 09/25/2023    MCV 88.4 09/25/2023    MCH 27.6 09/25/2023    MCHC 31.2 09/25/2023    RDW 13.4 09/25/2023    .0 09/25/2023    MPV 11.0 (H) 10/10/2012        Lab Results   Component Value Date     (H) 09/25/2023    BUN 20 (H) 09/25/2023    BUNCREA 21.2 (H) 12/08/2022    CREATSERUM 0.80 09/25/2023    ANIONGAP 3 09/25/2023     11/30/2015    GFRNAA 44 (L) 07/29/2022    GFRAA 51 (L) 07/29/2022    CA 9.9 09/25/2023    OSMOCALC 297 (H) 09/25/2023    ALKPHO 87 09/25/2023    AST 28 09/25/2023    ALT 30 09/25/2023    BILT 0.3 09/25/2023    TP 7.3 09/25/2023    ALB 4.0 09/25/2023    GLOBULIN 3.3 09/25/2023     09/25/2023    K 4.5 09/25/2023     09/25/2023    CO2 30.0 09/25/2023       Lab Results   Component Value Date    CHOLEST 146 09/25/2023    TRIG 114 09/25/2023    HDL 36 (L) 09/25/2023    LDL 89 09/25/2023    VLDL 18 09/25/2023    TCHDLRATIO 3.29 02/12/2018    NONHDLC 110 09/25/2023        Lab Results   Component Value Date    T4F 0.8 (L) 07/30/2018    TSH 1.030 09/25/2023        Lab Results   Component Value Date     (H) 10/13/2022    A1C 7.9 (A) 08/31/2023       Imaging:  [facility] SUSANNAH 2D+3D SCREENING BILAT (CPT=77067/57921)    Result Date: 12/1/2023  CONCLUSION:   BI-RADS CATEGORY:  DIAGNOSTIC CATEGORY 2--BENIGN FINDING NO CHANGE FROM COMPARISON ASSESSMENT.   RECOMMENDATIONS:  ROUTINE MAMMOGRAM AND CLINICAL EVALUATION IN 12 MONTHS.       A letter explaining the results in lay terms has been sent to the patient.  This exam was evaluated with a computer-aided device.  This patient's information has been entered into a reminder system with a target due date for the next mammogram.   LOCATION:  [place]   Dictated by (CST): Artemio Riojas MD on 12/01/2023 at 11:43 AM     Finalized by (CST): Artemio Riojas MD on 12/01/2023 at 11:46 AM          Assessment:     Vero Michel is a 58 year old  here for ilana-apical prolapse.    #ilana-apical prolapse: stage 2.  We discussed management option including pelvic floor physical therapy, pessary and surgery.  Patient would like to avoid surgery if possible and had a poor experience with PFPT.  She would like to try a pessary.  Unfortunately, her vaginal mucosa is very friable and atrophic so I recommended first doing a month of vaginal estrogen and then getting pessary placed.  We discussed the advantages of seeing a urogynecologist and she would like a referral.  [ ] urogyn referral sent  [ ] estrace cream sent to pharmacy    #Recurrent UTIs: denies symptoms today but would like urine cx as she has a lack/atypical symptoms when she has a UTI.  [ ] urine cx sent    Radha Dejesus MD  [facility] - OBGYN    Note to patient and family:  The  [employer] C[employer] Act makes medical notes available to patients in the interest of transparency.  However, please be advised that this is a medical document.  It is intended as a peer to peer communication.  It is written in medical language and may contain abbreviations or verbiage that are technical and unfamiliar.  It may appear blunt or direct.  Medical documents are intended to carry relevant information, facts as evident, and the clinical opinion of the practitioner.

## 2024-01-08 NOTE — PROGRESS NOTES
A continuous glucose sensor for 24 hour blood sugar monitoring was placed on patient today per [facility] order.   Serial NUMBER: [id number]  Exp date: (06/24/2024)  - After cleansing skin with alcohol prep, Sensor (katya pro)was inserted on  left arm Posterior upper arm area without difficulty. Small amount of skin prep was added around sensor tape after placement to help with sensor adhesive.    Area remains free of any bleeding or irritation or pain at site when patient left DM center.  Patient instructions:   Record daily food/drink intake and activity in log book  Call Diabetes center with any questions or concerns.   instructed to record food, exercise, insulin doses (if taking) on log provided

## 2024-01-08 NOTE — TELEPHONE ENCOUNTER
Spoke with pt. She was seen today for a prolapse. She is waiting for a prescription for \"vaginal estrogen\". I let her know Dr. Downs is still seeing pt's. She will send rx later today. Verbalized understanding.

## 2024-01-09 ENCOUNTER — TELEPHONE (OUTPATIENT)
Dept: ENDOCRINOLOGY CLINIC | Facility: CLINIC | Age: 59
End: 2024-01-09

## 2024-01-11 ENCOUNTER — TELEPHONE (OUTPATIENT)
Dept: NEUROLOGY | Facility: CLINIC | Age: 59
End: 2024-01-11

## 2024-01-16 ENCOUNTER — PATIENT MESSAGE (OUTPATIENT)
Dept: FAMILY MEDICINE CLINIC | Facility: CLINIC | Age: 59
End: 2024-01-16

## 2024-01-17 ENCOUNTER — MED REC SCAN ONLY (OUTPATIENT)
Dept: NEUROLOGY | Facility: CLINIC | Age: 59
End: 2024-01-17

## 2024-01-17 NOTE — TELEPHONE ENCOUNTER
RN sent the paperwork to the patient through My Chart. Patient is going to sign her portion and fax it herself. Copy sent to scan.

## 2024-01-22 ENCOUNTER — NURSE ONLY (OUTPATIENT)
Dept: ENDOCRINOLOGY CLINIC | Facility: CLINIC | Age: 59
End: 2024-01-22

## 2024-01-26 ENCOUNTER — PATIENT MESSAGE (OUTPATIENT)
Dept: ENDOCRINOLOGY CLINIC | Facility: CLINIC | Age: 59
End: 2024-01-26

## 2024-01-29 ENCOUNTER — OFFICE VISIT (OUTPATIENT)
Dept: ENDOCRINOLOGY CLINIC | Facility: CLINIC | Age: 59
End: 2024-01-29
Payer: COMMERCIAL

## 2024-01-29 VITALS
WEIGHT: 144 LBS | SYSTOLIC BLOOD PRESSURE: 110 MMHG | RESPIRATION RATE: 20 BRPM | BODY MASS INDEX: 24 KG/M2 | DIASTOLIC BLOOD PRESSURE: 68 MMHG

## 2024-01-29 DIAGNOSIS — E11.65 TYPE 2 DIABETES MELLITUS WITH HYPERGLYCEMIA, WITH LONG-TERM CURRENT USE OF INSULIN (HCC): Primary | ICD-10-CM

## 2024-01-29 DIAGNOSIS — I10 PRIMARY HYPERTENSION: ICD-10-CM

## 2024-01-29 DIAGNOSIS — Z79.4 TYPE 2 DIABETES MELLITUS WITH HYPERGLYCEMIA, WITH LONG-TERM CURRENT USE OF INSULIN (HCC): Primary | ICD-10-CM

## 2024-01-29 DIAGNOSIS — E78.2 MIXED HYPERLIPIDEMIA: ICD-10-CM

## 2024-01-29 LAB
CARTRIDGE LOT#: 648 NUMERIC
HEMOGLOBIN A1C: 8.5 % (ref 4.3–5.6)

## 2024-01-29 RX ORDER — PEN NEEDLE, DIABETIC 32GX 5/32"
1 NEEDLE, DISPOSABLE MISCELLANEOUS 4 TIMES DAILY
Qty: 200 EACH | Refills: 3 | Status: SHIPPED | OUTPATIENT
Start: 2024-01-29 | End: 2025-01-28

## 2024-01-29 RX ORDER — INSULIN DEGLUDEC INJECTION 100 U/ML
INJECTION, SOLUTION SUBCUTANEOUS
Qty: 15 ML | Refills: 0 | Status: SHIPPED | OUTPATIENT
Start: 2024-01-29

## 2024-01-29 RX ORDER — BLOOD-GLUCOSE SENSOR
1 EACH MISCELLANEOUS
Qty: 2 EACH | Refills: 11 | Status: SHIPPED | OUTPATIENT
Start: 2024-01-29

## 2024-01-29 RX ORDER — INSULIN LISPRO 100 [IU]/ML
INJECTION, SOLUTION INTRAVENOUS; SUBCUTANEOUS
Qty: 15 ML | Refills: 0 | Status: SHIPPED | OUTPATIENT
Start: 2024-01-29

## 2024-01-29 NOTE — PROGRESS NOTES
HPI:   Vero Michel is a 58 year old female who presents for follow up for the management of her diabetes.     Chief Complaint   Patient presents with    Diabetes     F/U - ale pro review       Diabetes: needs improvement  Type: 2   Duration:> 15 years  Current Meds: Humalog sliding scale: 160-200 mg/dl = 2 units, 201-240 mg/dl = 3 units, 241-300 mg/dl = 4 units, > 300 mg/dl = 5 units tid w/meals    SE: denies  Long term insulin use: no  Failed Meds/Previous Tx: glimepiride, Januvia, Jardiance - yeast infection, pioglitazone, Humalog - uses when on steroid therapy, Metformin - patient stopped, Trulicity - patient stopped    Complications: Proteinuria, CKD stage 3, TAMEKA    Professional Freestyle Ale CGM  Sensor placement date: 2024  Sensor removal date: 2024  Reason for CGM placement: evaluation of glucose patterns and trends  Analysis of data:  Sensor download: full report  in media  Average glucose : 216 mg/dl     CV (coefficient of variation) : 20.7%     58% time above 180mg /dl   20% time above 250 mg/dl  22% time in target range:  mg/dl   0% time below target range: 70mg/dl     Evaluation   1. Baseline hyperglycemia  2. Postprandial elevations  3. Low likelihood of hypoglycemia  4. Normal glucose variability         Overall glucose control:   HGBA1C:    Lab Results   Component Value Date    A1C 8.5 (A) 2024    A1C 7.9 (A) 2023    A1C 6.2 (A) 2023     (H) 10/13/2022       Hypertension: stable, at goal  Blood Pressure: 110/68   Medication prescribed: none  SE: n/a     Hyperlipidemia: stable  LDL: 89   Tri  Medication prescribed: rosuvastatin  SE: denies     Wt Readings from Last 3 Encounters:   24 144 lb (65.3 kg)   24 145 lb 6.4 oz (66 kg)   24 147 lb (66.7 kg)     BP Readings from Last 3 Encounters:   24 110/68   24 115/75   24 118/62          Past History:   She  has a past medical history of Anxiety, Arthritis (2000),  Back pain (01/01/2010), Constipation (04/01/2022), Depression, Diabetes (HCC), Diabetes mellitus (HCC) (01/01/2005), Fatigue (01/01/2001), Flatulence/gas pain/belching (01/01/2000), Frequent use of laxatives (04/01/2022), H/O degenerative disc disease, Headache disorder (01/01/2000), High cholesterol (01/01/2010), History of depression (01/01/1990), History of mental disorder (01/01/1990), motion sickness, Hyperlipidemia, Hypertension, Lichen sclerosus, Loss of appetite (02/01/2022), Lumbar disc herniation, Migraines, Nausea (02/01/2022), Nausea and/or vomiting (05/02/2023), Osteoarthritis, Pain in joints (01/01/2010), Pain with bowel movements (04/01/2022), Problems with swallowing, S/P hip replacement, left, Sleep apnea (01/01/2000), Sleep disturbance (01/01/2005), Stress, Type 2 diabetes mellitus ([facility]), Uncomfortable fullness after meals (02/01/2022), Visual impairment, Vomiting (02/01/2022), Wears glasses (01/01/1990), and Weight loss (05/01/2022).   Her family history includes Breast Cancer (age of onset: 50) in her paternal grandmother; Cancer (age of onset: 73) in her father; Diabetes in her maternal grandfather; Hypertension in her mother, sister, and another family member; Lipids in an other family member; Migraines in her daughter; Prostate Cancer in her father.   She  reports that she has never smoked. She has never been exposed to tobacco smoke. She has never used smokeless tobacco. She reports current alcohol use. She reports that she does not use drugs.     She is allergic to topiramate.     Current Outpatient Medications on File Prior to Visit   Medication Sig    linaCLOtide (LINZESS) 72 MCG Oral Cap Take 72 mcg by mouth daily.    estradiol 0.1 MG/GM Vaginal Cream 1 gram per vagina nightly for 2 weeks, then 1 gram per vagina three times per week for at least 10 weeks. May taper to 0.5 grams (pea-sized amount) nightly thereafter.    ZENPEP 46510-792153 units Oral Cap DR Particles TAKE 1 CAPSULE BY  MOUTH 3 TIMES DAILY BEFORE MEALS. PLEASE ALSO TAKE 1 CAPSULE WITH LARGE SNACKS.    Eletriptan Hydrobromide 40 MG Oral Tab use at onset; may repeat once after 4 hours- ONLY 2 IN 24 HOUR PERIOD MAX.  This is a 30 day supply.    nortriptyline 25 MG Oral Cap Take 1 capsule (25 mg total) by mouth nightly.    nortriptyline 10 MG Oral Cap Take 1 capsule (10 mg total) by mouth nightly. Take with 25mg at bedtime po for total of 35mg at bedtime po.    FARXIGA 10 MG Oral Tab TAKE 1 TABLET BY MOUTH EVERY DAY    rosuvastatin 20 MG Oral Tab Take 1 tablet (20 mg total) by mouth nightly.    ALPRAZolam 1 MG Oral Tab Take 1 tablet (1 mg total) by mouth 2 (two) times daily as needed for Sleep or Anxiety.    lamoTRIgine 200 MG Oral Tab Take 1 tablet (200 mg total) by mouth 2 (two) times daily.    Atogepant (QULIPTA) 30 MG Oral Tab TAKE 1 TABLET BY MOUTH DAILY    omeprazole 20 MG Oral Capsule Delayed Release Take 1 capsule (20 mg total) by mouth 2 (two) times daily before meals.    ondansetron (ZOFRAN) 4 mg tablet TAKE 1 TABLET BY MOUTH TO BE TAKEN WITH THE FIRST DOSE OF MIGRAINE MEDICATION    dicyclomine 10 MG Oral Cap Take 1 capsule (10 mg total) by mouth 3 (three) times daily as needed (abd cramping).    dexamethasone 2 MG Oral Tab 1 tab with the first dose of Migraine medication intervention    ketoconazole 2 % External Cream     Blood Glucose Monitoring Suppl (CONTOUR NEXT ONE) Does not apply Device 1 Device daily.    Glucose Blood (CONTOUR NEXT TEST) In Vitro Strip Test blood glucose twice daily    Microlet Lancets Does not apply Misc Test blood glucose twice daily    folic acid 400 MCG Oral Tab Take 1 tablet (400 mcg total) by mouth daily.    Fluticasone Propionate 50 MCG/ACT Nasal Suspension USE 1 SPRAY IN EACH NOSTRIL TWICE A DAY    [DISCONTINUED] Insulin Lispro, 1 Unit Dial, (HUMALOG KWIKPEN) 100 UNIT/ML Subcutaneous Solution Pen-injector 3- 6 units before meals as needed per sliding scale     No current facility-administered  medications on file prior to visit.       CMP  (most recent labs)   Lab Results   Component Value Date/Time     (H) 09/25/2023 09:23 AM    BUN 20 (H) 09/25/2023 09:23 AM    CREATSERUM 0.80 09/25/2023 09:23 AM    GFRNAA 44 (L) 07/29/2022 11:53 AM    GFRAA 51 (L) 07/29/2022 11:53 AM    EGFRCR 85 09/25/2023 09:23 AM    CA 9.9 09/25/2023 09:23 AM    ALKPHO 87 09/25/2023 09:23 AM    AST 28 09/25/2023 09:23 AM    ALT 30 09/25/2023 09:23 AM    BILT 0.3 09/25/2023 09:23 AM    TP 7.3 09/25/2023 09:23 AM    ALB 4.0 09/25/2023 09:23 AM     09/25/2023 09:23 AM    K 4.5 09/25/2023 09:23 AM     09/25/2023 09:23 AM    CO2 30.0 09/25/2023 09:23 AM           Lipids  (most recent labs)   Lab Results   Component Value Date/Time    CHOLEST 146 09/25/2023 09:23 AM    TRIG 114 09/25/2023 09:23 AM    HDL 36 (L) 09/25/2023 09:23 AM    LDL 89 09/25/2023 09:23 AM    NONHDLC 110 09/25/2023 09:23 AM          Diabetes  (most recent labs)   Lab Results   Component Value Date/Time    A1C 8.5 (A) 01/29/2024 11:09 AM          Microalb (most recent labs)   Lab Results   Component Value Date/Time    MICROALBCREA 20.9 09/25/2023 09:23 AM        Lab results reviewed with patient.    REVIEW OF SYSTEMS:   SKIN: denies any unusual skin lesions or rashes  RESPIRATORY: denies shortness of breath with exertion  CARDIOVASCULAR: denies chest pain on exertion  GI: denies nausea, abdominal pain or heartburn  NEURO: c/o chronic headaches and denies numbness and tingling to extremities  ENDO: denies polydipsia, polyuria or polyphagia, denies unexplained weight changes    EXAM:   /68   Resp 20   Wt 144 lb (65.3 kg)   LMP 10/21/2012 (LMP Unknown)   BMI 23.96 kg/m²  Estimated body mass index is 23.96 kg/m² as calculated from the following:    Height as of 1/18/24: 5' 5\" (1.651 m).    Weight as of this encounter: 144 lb (65.3 kg).   Physical Exam  Vitals reviewed.   Constitutional:       Appearance: Normal appearance.   Cardiovascular:       Pulses:           Dorsalis pedis pulses are 2+ on the right side and 2+ on the left side.   Pulmonary:      Effort: Pulmonary effort is normal.   Feet:      Right foot:      Protective Sensation: 5 sites tested.  5 sites sensed.      Skin integrity: Skin integrity normal.      Toenail Condition: Right toenails are normal.      Left foot:      Protective Sensation: 5 sites tested.  5 sites sensed.      Skin integrity: Skin integrity normal.      Toenail Condition: Left toenails are normal.      Comments: Diabetes foot exam performed: Vibration to dorsum to the first toe perceived bilaterally.  Foot care practices reviewed with patient.    Neurological:      Mental Status: She is alert and oriented to person, place, and time.   Psychiatric:      Comments: Patient crying during office visit         ASSESSMENT AND PLAN:   As for her Diabetes, it is poorly controlled, needs further observation, needs improvement, needs to follow diet more regularly.  Recommendations are: check feet daily and will check labs as ordered.  Patient to start Tresiba 10 units injection once daily and change Humalog sliding scale: 150-200 mg/dl = 2 units, 201-250 mg/dl = 4 units, 251-300 mg/dl = 6 units, 301-350 mg/dl = 8 units, > 350 mg/dl = 10 units tid w/meals.  Reviewed insulin pen use, dosing with the pen, pen needles, site selection for injections, rotation and injection administration.    Patient to start to use personal Freestyle Ale 3 CGM for glucose monitoring.  Sample provided, instructed on use, stephanie and streaming set up, prescription for sensors sent to pharmacy.  Discussed self monitoring blood glucose and the importance of monitoring.  Patient agreed to monitoring qid - before meals and 2 hours postprandial of one meal per day if not using CGM.  Hypoglycemia S&S, Rx, and when to call APRN/CDE reviewed using Rule of 15's. Stressed need to call if 2 readings below 80 mg/dl in 1 week for medication adjustment.   Reinforced healthy  eating in healthy portions and increasing daily physical activity.  Order for MNT placed.     As for her hypertension, Blood Pressure is well controlled, stable.   PLAN: will continue present medications, reviewed diet, exercise and weight control     As for her cholesterol, Lipids are stable, no significant medication side effects noted.   PLAN: will continue present medications, reviewed diet, exercise and weight control    DM Health Maintenance  Last dilated eye exam: Last Dilated Eye Exam: 23   Exam shows retinopathy? Eye Exam shows Diabetic Retinopathy?: No    Last diabetic foot exam: Last Foot Exam: 24    Date of last PHQ-2 depression screen: PHQ-2 - Date of last depression screenin2024    Patient  reports that she has never smoked. She has never been exposed to tobacco smoke. She has never used smokeless tobacco.  When is flu vaccine due? Influenza Vaccine(1) due on 10/01/2023  When is pneumonia vaccine due? No recommendations at this time    The patient indicates understanding of these issues and agrees to the plan.  Refills addressed at time of office visit.    Diagnoses and all orders for this visit:    Type 2 diabetes mellitus with hyperglycemia, with long-term current use of insulin (HCC)  -     HEMOGLOBIN A1C  -     Microalb/Creat Ratio, Random Urine [E]; Future  -     Insulin Degludec (TRESIBA FLEXTOUCH) 100 UNIT/ML Subcutaneous Solution Pen-injector; Inject daily as directed up TDD = 20 units  -     Insulin Lispro, 1 Unit Dial, (HUMALOG KWIKPEN) 100 UNIT/ML Subcutaneous Solution Pen-injector; Inject 3x/day with meals as directed up to TDD = 20 units  -     Insulin Pen Needle (BD PEN NEEDLE ARCHANA U/F) 32G X 4 MM Does not apply Misc; Inject 1 Pen into the skin 4 (four) times daily. Use to inject insulin 4x/day as directed  -     FREESTYLE TANIKA 3 SENSOR Does not apply Misc; 1 each every 14 (fourteen) days.  -     OP REFERRAL TO DIAB NUTRITION MANAGEMENT 3 HRS    Primary  hypertension    Mixed hyperlipidemia       Return in about 25 days (around 2/23/2024) for 45 minute appointment, Personal CGM, Virtual Visit.    The risks and benefits of my recommendations, as well as other treatment options were discussed with the patient today. Questions were answered to the best of my knowledge.   50 min spent with patient and >50% time spent counseling and coordinating care related to their office visit.      Lenore ESPINAL, BC-ADM, [facility]

## 2024-01-29 NOTE — PROGRESS NOTES
A continuous glucose sensor for 24 hour blood sugar monitoring was placed on patient today per APN order.   Serial NUMBER: [id number]  Exp date: (5/31/2024)  - After cleansing skin with alcohol prep, Sensor (katya 3)was inserted on  left Posterior upper arm area without difficulty. Small amount of skin prep was added around sensor tape after placement to help with sensor adhesive.    Area remains free of any bleeding or irritation or pain at site when patient left DM center.  Patient instructions:   Record daily food/drink intake and activity in log book  Call Diabetes center with any questions or concerns.   instructed to record food, exercise, insulin doses (if taking) on log provided

## 2024-01-29 NOTE — PATIENT INSTRUCTIONS
We are here to support you with Diabetes but please remember that you still need your primary care doctor for your routine health maintenance.   Your current A1C: 8.5%  This test provides us with your average blood sugar for the past 3 months.   The main goal of diabetes treatment is to keep your sugar from going too high. We measure your overall blood sugar trends with a Hemoglobin A1C test. (also called an A1C)  For most people the target is less than 7.0% but sometimes we make exceptions based on age, health history and other factors.   Keeping an A1C less than 7% helps prevents diabetes related health problems.   If your A1C goes too high, then we need to talk about changing your current diabetes treatment.    MEDICATIONS:   It is important to take all of your medications as prescribed.   Please call me if you cannot get the prescriptions filled or are having issues with refills.   Also, please call me if you have any issues with medication questions, side effects, dosing questions or problems with your blood sugar trends BEFORE CHANGING OR STOPPING ANY MEDICATIONS.   Start Tresiba 10 units injection once daily  Take Humalog sliding scale 3x/day with meals:  You will need to check your blood sugar before each meal.  Please follow the scale below for your insulin dose:     If blood sugar is 150-200, take 2 units of Humalog  If blood sugar is 201-250, take 4 units of Humalog  If blood sugar is 251-300, take 6 units of Humalog  If blood sugar is 301-350, take 8 units of Humalog  If blood sugar is > 350, take 10 units of Humalog    Blood sugar testing:   Always bring your glucose meter or blood sugar logbook to every appointment here at the diabetes center.  This allows me to safely make adjustments to your diabetes plan.   In order for me to determine any patterns in your blood sugars, you will need to use continuous glucose monitoring or test your blood sugar 4 times daily   Call if 2 readings below 80 mg/dl in 1  week for medication adjustment.     Blood sugar targets:  Before breakfast:   (preferably < 110)  2 hours After meals: less than 180 (preferably less than 150)   Call for persistent blood sugars < 75 or > 200.   Blood sugars greater than 200 are not acceptable to reach your goal of improving diabetes      Health Maintenance:   1. LABS: It is important to monitor your kidney function (blood and urine protein levels) , liver function tests and cholesterol levels when you have diabetes.     2. FOOT EXAMS:  daily foot inspections for foot wounds or skin changes are important for foot care. Any unusual changes should be reported immediately.    3. EYE EXAMS: Checking your eyes for diabetes changes is important and you should have a dilated eye exam done by an eye doctor EVERY year since these changes occur in the BACK of the eye and not visible by you.  Please let me know if you need provider list for eye doctor.     Lifestyle Therapy:    1. NUTRITION: Maintain optimal weight, calorie restriction if overweight, plant-based diet    2. PHYSICAL ACTIVITY: 150 minutes per week (30 minutes a day 5 days a week) of moderate exertion such as walking, stair climbing.  Include strength training 2-3 times per week.  Increase as tolerated.    3. SLEEP: Try and get 7-8 hours of sleep per night    4. BEHAVIOR:  Tobacco cessation and alcohol in moderation        Lenore ESPINAL, BC-[facility], [facility]  [address] [place] [address]  [address], [address] [place] [address]  [address] [place] [address]  Diabetes Services at [facility]  T [phone]  F [phone]

## 2024-01-30 ENCOUNTER — TELEPHONE (OUTPATIENT)
Dept: NEUROLOGY | Facility: CLINIC | Age: 59
End: 2024-01-30

## 2024-02-01 ENCOUNTER — PATIENT MESSAGE (OUTPATIENT)
Dept: ENDOCRINOLOGY CLINIC | Facility: CLINIC | Age: 59
End: 2024-02-01

## 2024-02-01 ENCOUNTER — PATIENT MESSAGE (OUTPATIENT)
Dept: FAMILY MEDICINE CLINIC | Facility: CLINIC | Age: 59
End: 2024-02-01

## 2024-02-01 NOTE — TELEPHONE ENCOUNTER
I am not aware of patient being fired by neurology.  They would have to comment to that concern.  It appears they received the [facility] paperwork and are waiting for Dr. Landis to sign it.

## 2024-02-08 ENCOUNTER — MED REC SCAN ONLY (OUTPATIENT)
Dept: NEUROLOGY | Facility: CLINIC | Age: 59
End: 2024-02-08

## 2024-02-08 ENCOUNTER — TELEPHONE (OUTPATIENT)
Dept: NEUROLOGY | Facility: CLINIC | Age: 59
End: 2024-02-08

## 2024-02-08 NOTE — TELEPHONE ENCOUNTER
Received [place] forms for completion.    Initiated and endorsed for Dr. Landis to sign upon his return. Yasmine has not seen patient since 4/2022.  
Received completed, signed forms. Returned to [employer] via fax,  confirmation rec'd. Copy to scan, see Med Rec 2/8/24.  
no

## 2024-02-20 ENCOUNTER — TELEPHONE (OUTPATIENT)
Facility: CLINIC | Age: 59
End: 2024-02-20

## 2024-02-20 ENCOUNTER — OFFICE VISIT (OUTPATIENT)
Facility: CLINIC | Age: 59
End: 2024-02-20
Payer: COMMERCIAL

## 2024-02-20 VITALS
SYSTOLIC BLOOD PRESSURE: 124 MMHG | HEIGHT: 65 IN | DIASTOLIC BLOOD PRESSURE: 72 MMHG | HEART RATE: 81 BPM | BODY MASS INDEX: 24.99 KG/M2 | WEIGHT: 150 LBS

## 2024-02-20 DIAGNOSIS — N94.89 VULVAR BURNING: ICD-10-CM

## 2024-02-20 DIAGNOSIS — N76.0 VAGINITIS AND VULVOVAGINITIS: ICD-10-CM

## 2024-02-20 DIAGNOSIS — N90.89 VULVAR LESION: Primary | ICD-10-CM

## 2024-02-20 PROCEDURE — 88305 TISSUE EXAM BY PATHOLOGIST: CPT | Performed by: STUDENT IN AN ORGANIZED HEALTH CARE EDUCATION/TRAINING PROGRAM

## 2024-02-20 PROCEDURE — 56605 BIOPSY OF VULVA/PERINEUM: CPT | Performed by: STUDENT IN AN ORGANIZED HEALTH CARE EDUCATION/TRAINING PROGRAM

## 2024-02-20 PROCEDURE — 99213 OFFICE O/P EST LOW 20 MIN: CPT | Performed by: STUDENT IN AN ORGANIZED HEALTH CARE EDUCATION/TRAINING PROGRAM

## 2024-02-20 PROCEDURE — 81514 NFCT DS BV&VAGINITIS DNA ALG: CPT | Performed by: STUDENT IN AN ORGANIZED HEALTH CARE EDUCATION/TRAINING PROGRAM

## 2024-02-20 NOTE — TELEPHONE ENCOUNTER
Reason Skin breakdown on labia   Last seen  1/8/24  --- perineal area is painful, tiny bit red, biopsy 3 years ago was negative. Antifungal cream has been helpful. vaginal mucosa is very friable and atrophic so I recommended first doing a month of vaginal estrogen and then getting pessary placed, urogyn referral    Rx: estradiol 0.1 MG/GM Vaginal Cream    1 gram per vagina nightly for 2 weeks, then 1 gram per vagina three times per week for at least 10 weeks. May taper to 0.5 grams (pea-sized amount) nightly thereafter.    History  Recurrent UTI, ilana-apical prolapse    Onset 2-3d ago    Assessment Went for a walk and noticed skin breakdown on both labia, skin seems to be open.    Plan  Needs office evaluation.   Follow up Appointment  Appt today 1600 w AM   Patient verbalized understanding, agreed to and intend to comply with plan of care.

## 2024-02-20 NOTE — PROGRESS NOTES
[facility]  Obstetrics and Gynecology   Established Patient Visit    Chief complaint:   Chief Complaint   Patient presents with    Gyn Problem     Patient reports pain around the labia for 3 days          HPI: Vero Michel is a 58 year old  with Patient's last menstrual period was 10/21/2012 (lmp unknown).      Pt saw Dr Downs 24, had an area of perineum that was painful, she was recommended vaginal estrogen, on exam the vaginal mucosa was very friable and atrophic. Was also referred to urogyn for pessary for prolapse, pt has appt   Pt had biopsy of perineum in 2018 (with Dr Chandler?) that showed \"squamous mucosa with mild squamous cell hyperplasia. There is no significant inflammation present and squamous dysplasia is not identified\" (path report in care everywhere)    Pt reports this has been happening on/off for at least 15 years. Has been told in the past was suspicious for lichen sclerosus, lichen planus, fungal infection, has tried steroids, antifungals, and both have helped. In past skin has sloughed off  Only biopsy was the time in 2018     Feels burning pain all over labia bilaterally, hard to sit, no itching, no abnormal discharge  Has been using vaginal estrogen - doesn't think this current flare up is related since has been at least a month      Meds:  Current Outpatient Medications on File Prior to Visit   Medication Sig Dispense Refill    Omeprazole 40 MG Oral Capsule Delayed Release Take 1 capsule (40 mg total) by mouth daily. 90 capsule 0    ALPRAZolam 1 MG Oral Tab Take 1 tablet (1 mg total) by mouth 2 (two) times daily as needed for Sleep or Anxiety. 60 tablet 3    Insulin Degludec (TRESIBA FLEXTOUCH) 100 UNIT/ML Subcutaneous Solution Pen-injector Inject daily as directed up TDD = 20 units 15 mL 0    Insulin Lispro, 1 Unit Dial, (HUMALOG KWIKPEN) 100 UNIT/ML Subcutaneous Solution Pen-injector Inject 3x/day with meals as directed up to TDD = 20 units 15 mL 0    Insulin Pen  Needle (BD PEN NEEDLE ARCHANA U/F) 32G X 4 MM Does not apply Misc Inject 1 Pen into the skin 4 (four) times daily. Use to inject insulin 4x/day as directed 200 each 3    FREESTYLE TANIKA 3 SENSOR Does not apply Misc 1 each every 14 (fourteen) days. 2 each 11    linaCLOtide (LINZESS) 72 MCG Oral Cap Take 72 mcg by mouth daily. 90 capsule 3    estradiol 0.1 MG/GM Vaginal Cream 1 gram per vagina nightly for 2 weeks, then 1 gram per vagina three times per week for at least 10 weeks. May taper to 0.5 grams (pea-sized amount) nightly thereafter. 1 each 3    ZENPEP 74877-183131 units Oral Cap DR Particles TAKE 1 CAPSULE BY MOUTH 3 TIMES DAILY BEFORE MEALS. PLEASE ALSO TAKE 1 CAPSULE WITH LARGE SNACKS.      Eletriptan Hydrobromide 40 MG Oral Tab use at onset; may repeat once after 4 hours- ONLY 2 IN 24 HOUR PERIOD MAX.  This is a 30 day supply. 8 tablet 11    nortriptyline 25 MG Oral Cap Take 1 capsule (25 mg total) by mouth nightly. 90 capsule 1    nortriptyline 10 MG Oral Cap Take 1 capsule (10 mg total) by mouth nightly. Take with 25mg at bedtime po for total of 35mg at bedtime po. 90 capsule 1    FARXIGA 10 MG Oral Tab TAKE 1 TABLET BY MOUTH EVERY DAY 90 tablet 1    rosuvastatin 20 MG Oral Tab Take 1 tablet (20 mg total) by mouth nightly. 90 tablet 1    lamoTRIgine 200 MG Oral Tab Take 1 tablet (200 mg total) by mouth 2 (two) times daily. 180 tablet 1    Atogepant (QULIPTA) 30 MG Oral Tab TAKE 1 TABLET BY MOUTH DAILY 90 tablet 3    omeprazole 20 MG Oral Capsule Delayed Release Take 1 capsule (20 mg total) by mouth 2 (two) times daily before meals. 180 capsule 3    ondansetron (ZOFRAN) 4 mg tablet TAKE 1 TABLET BY MOUTH TO BE TAKEN WITH THE FIRST DOSE OF MIGRAINE MEDICATION 30 tablet 2    dicyclomine 10 MG Oral Cap Take 1 capsule (10 mg total) by mouth 3 (three) times daily as needed (abd cramping). 90 capsule 1    dexamethasone 2 MG Oral Tab 1 tab with the first dose of Migraine medication intervention 20 tablet 1     ketoconazole 2 % External Cream       Blood Glucose Monitoring Suppl (CONTOUR NEXT ONE) Does not apply Device 1 Device daily. 1 each 0    Glucose Blood (CONTOUR NEXT TEST) In Vitro Strip Test blood glucose twice daily 200 each 11    Microlet Lancets Does not apply Misc Test blood glucose twice daily 200 each 11    folic acid 400 MCG Oral Tab Take 1 tablet (400 mcg total) by mouth daily.      Fluticasone Propionate 50 MCG/ACT Nasal Suspension USE 1 SPRAY IN EACH NOSTRIL TWICE A DAY       No current facility-administered medications on file prior to visit.         PHYSICAL EXAM:     Vitals:    02/20/24 1553   BP: 124/72   Pulse: 81   Weight: 150 lb (68 kg)   Height: 65\"       Body mass index is 24.96 kg/m².    Exam:  General: NAD, appears well  Pelvic exam: diffuse erythema and edema bilateral labia extending to perineum and anterior over clitoris  There is some whitish material between labia - may be estrogen? Vs skin sloughing?      Vulvar Biopsy Procedure Note    Pre-procedure diagnosis:   Vulvar lesion    Post-procedure diagnosis:  Vulvar lesion    Procedure:  Vulvar Biopsy     Procedure Details:   A discussion was held with patient including diagnosis, prognosis and management. Recommendation made for biopsy. Risks, benefits and alteratives were discussed with the patient. The patient was agreed to proceed with procedure. She provided written and verbal consent. The patient was positioned supine and in stir-ups.    The vulva was thoroughly examined and patient identified the most symptomatic site. The vulva was noted for above findings. Two representative sites were prepped with iodine. Each site was infiltrated with 3 ml of Lidocaine with epinephrine. After confirmation of adequate anesthesia, a punch biopsy was then performed at each site (bilateral labia).     All tissue collected to be sent to pathology.     The biopsy sites were re-approximated with 3-0 vicryl suture. Good hemostasis noted. The patient was  advised to keep the biopsy site clean and dry. She may use soap and water over site including sitz baths as needed. Patient instructed to place small amount of Vaseline if irritation with clothing noted.       Assessment & Plan:     Vero Michel is a 58 year old  female here for      Diagnoses and all orders for this visit:    Vulvar lesion  -     BIOPSY VULVA/PERINEUM,ONE LESN  -     Specimen to pathology , tissue; Future    Vulvar burning  -     BIOPSY VULVA/PERINEUM,ONE LESN  -     Specimen to pathology , tissue; Future    Vaginitis and vulvovaginitis  -     Vaginitis Vaginosis PCR Panel; Future       Appearance of inflammatory vulvar condition (ie lichen sclerosus) vs possible severe fungal infection or contact dermatitis  Biopsy recommended, done  F/u vaginitis swab  D/w pt based on inflamed appearance would start topical steroid, she has clobetasol at home from prior flares. D/w pt ok to continue vaginal estrogen.  If fungal infection confirmed then will rx antifungal  D/w pt that if lichen sclerosus diagnosed that would recommend clobetasol and vaginal estrogen indefinitely      RTC 1 mo to f/u sx (sooner PRN), pending biopsy result    Nataly Hoyt MD  [facility] - OBGYN

## 2024-02-21 ENCOUNTER — TELEPHONE (OUTPATIENT)
Facility: CLINIC | Age: 59
End: 2024-02-21

## 2024-02-21 LAB
BV BACTERIA DNA VAG QL NAA+PROBE: NEGATIVE
C GLABRATA DNA VAG QL NAA+PROBE: POSITIVE
C KRUSEI DNA VAG QL NAA+PROBE: NEGATIVE
CANDIDA DNA VAG QL NAA+PROBE: POSITIVE
T VAGINALIS DNA VAG QL NAA+PROBE: NEGATIVE

## 2024-02-21 RX ORDER — FLUCONAZOLE 150 MG/1
150 TABLET ORAL
Qty: 3 TABLET | Refills: 0 | Status: SHIPPED | OUTPATIENT
Start: 2024-02-21

## 2024-02-21 NOTE — PROGRESS NOTES
Pt's vaginitis swab resulted with multiple species of yeast, actually. Only one of them is sensitive to fluconazole, the other one is treated with vaginal boric acid or nystatin suppositories (or a cream called flucytosine). Will rx fluconazole. Will actually need to get nystatin suppositories or flucytosine cream at a compounding pharmacy - I can't order it in [facility]. Could try boric acid suppositories nightly for 14 days (those are over the counter) and then if not working we can send rx to compounding pharmacy for these other options  Still also waiting on the biopsy to result. For now, can also continue the clobetasol for the inflammation.

## 2024-02-21 NOTE — PROGRESS NOTES
Pt aware of results & recommendations for fluconazole & boric acid suppositories. Pt does not think she will be able to keep the suppository in due to her prolapse. She is concerned her insurance may not cover a compound medication. Mikey like to know if there are any other options. Will route to Dr. Hoyt and call with any recommendations. Verbalized understanding.

## 2024-02-22 NOTE — PROGRESS NOTES
Pt aware she should not go to the [facility] due to biopsy sites not healed.    Pt also aware Biopsy confirms symptoms are most likely from fungal infection. They did not see lichen sclerosus.     Advised to try fluconazole & boric acid and call if symptoms not improving. Pt at the pharmacy now and they have a prescription for Brexafemme. They have to order the rx. Pt asking if she should try that medication or the boric acid first. Pt aware I will call back with recommendations. Verbalized understanding.

## 2024-02-22 NOTE — PROGRESS NOTES
Pt will try boric acid suppositories. Will call if unable to keep in for an rx to be sent to her pharmacy.     Pt had vulvar biopsies done on 2/20/24. She has sutures and is going to a water park on Saturday. Wants to know if ok to go. Will be in a pool most of the day. Will call pt with recommendations. Verbalized understanding.

## 2024-02-23 ENCOUNTER — TELEPHONE (OUTPATIENT)
Dept: ENDOCRINOLOGY CLINIC | Facility: CLINIC | Age: 59
End: 2024-02-23

## 2024-02-23 ENCOUNTER — TELEMEDICINE (OUTPATIENT)
Dept: ENDOCRINOLOGY CLINIC | Facility: CLINIC | Age: 59
End: 2024-02-23
Payer: COMMERCIAL

## 2024-02-23 DIAGNOSIS — E78.2 MIXED HYPERLIPIDEMIA: ICD-10-CM

## 2024-02-23 DIAGNOSIS — Z79.4 TYPE 2 DIABETES MELLITUS WITH HYPERGLYCEMIA, WITH LONG-TERM CURRENT USE OF INSULIN (HCC): Primary | ICD-10-CM

## 2024-02-23 DIAGNOSIS — E11.65 TYPE 2 DIABETES MELLITUS WITH HYPERGLYCEMIA, WITH LONG-TERM CURRENT USE OF INSULIN (HCC): Primary | ICD-10-CM

## 2024-02-23 DIAGNOSIS — I10 PRIMARY HYPERTENSION: ICD-10-CM

## 2024-02-23 PROCEDURE — 99213 OFFICE O/P EST LOW 20 MIN: CPT | Performed by: NURSE PRACTITIONER

## 2024-02-23 PROCEDURE — 95251 CONT GLUC MNTR ANALYSIS I&R: CPT | Performed by: NURSE PRACTITIONER

## 2024-02-23 NOTE — PROGRESS NOTES
HPI:   Vero Michel is a 58 year old female who presents virtually for the management of her diabetes.   This visit is conducted using Telemedicine with live, two way, interactive video and audio.  Patient verbally consents to Telemedicine visit.  Patient understands and accepts financial responsibility for any deductible, co-insurance and/or co-pays associated with this service.    Chief Complaint: Diabetes Follow Up    Diabetes: needs improvement  Type: 2   Duration:> 15 years  Current Meds: Tresiba 10 units injection daily, Humalog sliding scale: 150-200 mg/dl = 2 units, 201-250 mg/dl = 4 units, 251-300 mg/dl = 6 units, 301-350 mg/dl = 8 units, > 350 mg/dl = 10 units tid w/meals   Long term insulin use: yes  Failed Meds/Previous Tx: glimepiride, Januvia, Jardiance - mycotic infection, pioglitazone, Metformin, Trulicity    Complications: Proteinuria, CKD stage 3, TAMEKA    Personal Freestyle Ale CGM  Analysis of data: 2/9/2024 - 2/22/2024  % Time CGM is Active: 97%  Sensor download: full report  in media  Average glucose : 179 mg/dl   GMI: 7.9%    CV (coefficient of variation) : 17.7%     39% time above 180mg /dl   3% time above 250 mg/dl  58% time in target range:  mg/dl   0% time below target range: 70mg/dl     Evaluation   1. Baseline glucose stable but elevated  2. Occasional postprandial elevations with return to baseline  3. Low likelihood of hypoglycemia         Overall glucose control:   HGBA1C:    Lab Results   Component Value Date    A1C 8.5 (A) 01/29/2024    A1C 7.9 (A) 08/31/2023    A1C 6.2 (A) 02/06/2023     (H) 10/13/2022          Wt Readings from Last 3 Encounters:   02/20/24 150 lb (68 kg)   01/29/24 144 lb (65.3 kg)   01/18/24 145 lb 6.4 oz (66 kg)           Past History:   She  has a past medical history of Anxiety, Arthritis (01/01/2000), Back pain (01/01/2010), Constipation (04/01/2022), Depression, Diabetes (HCC), Diabetes mellitus (HCC) (01/01/2005), Fatigue (01/01/2001),  Flatulence/gas pain/belching (01/01/2000), Frequent use of laxatives (04/01/2022), H/O degenerative disc disease, Headache disorder (01/01/2000), High cholesterol (01/01/2010), History of depression (01/01/1990), History of mental disorder (01/01/1990), motion sickness, Hyperlipidemia, Hypertension, Lichen sclerosus, Loss of appetite (02/01/2022), Lumbar disc herniation, Migraines, Nausea (02/01/2022), Nausea and/or vomiting (05/02/2023), Osteoarthritis, Pain in joints (01/01/2010), Pain with bowel movements (04/01/2022), Problems with swallowing, S/P hip replacement, left, Sleep apnea (01/01/2000), Sleep disturbance (01/01/2005), Stress, Type 2 diabetes mellitus (HCC), Uncomfortable fullness after meals (02/01/2022), Visual impairment, Vomiting (02/01/2022), Wears glasses (01/01/1990), and Weight loss (05/01/2022).   Her family history includes Breast Cancer (age of onset: 50) in her paternal grandmother; Cancer (age of onset: 73) in her father; Diabetes in her maternal grandfather; Hypertension in her mother, sister, and another family member; Lipids in an other family member; Migraines in her daughter; Prostate Cancer in her father; Psychiatric in her mother.   She  reports that she has never smoked. She has never been exposed to tobacco smoke. She has never used smokeless tobacco. She reports current alcohol use. She reports that she does not use drugs.     She is allergic to topiramate.     Current Outpatient Medications on File Prior to Visit   Medication Sig    fluconazole 150 MG Oral Tab Take 1 tablet (150 mg total) by mouth every third day as needed (Take 1 tablet, once. If symptoms do not improve, may repeat in 3 days.).    Omeprazole 40 MG Oral Capsule Delayed Release Take 1 capsule (40 mg total) by mouth daily.    ALPRAZolam 1 MG Oral Tab Take 1 tablet (1 mg total) by mouth 2 (two) times daily as needed for Sleep or Anxiety.    Insulin Degludec (TRESIBA FLEXTOUCH) 100 UNIT/ML Subcutaneous Solution  Pen-injector Inject daily as directed up TDD = 20 units    Insulin Lispro, 1 Unit Dial, (HUMALOG KWIKPEN) 100 UNIT/ML Subcutaneous Solution Pen-injector Inject 3x/day with meals as directed up to TDD = 20 units    Insulin Pen Needle (BD PEN NEEDLE ARCHANA U/F) 32G X 4 MM Does not apply Misc Inject 1 Pen into the skin 4 (four) times daily. Use to inject insulin 4x/day as directed    FREESTYLE TANIKA 3 SENSOR Does not apply Misc 1 each every 14 (fourteen) days.    linaCLOtide (LINZESS) 72 MCG Oral Cap Take 72 mcg by mouth daily.    estradiol 0.1 MG/GM Vaginal Cream 1 gram per vagina nightly for 2 weeks, then 1 gram per vagina three times per week for at least 10 weeks. May taper to 0.5 grams (pea-sized amount) nightly thereafter.    ZENPEP 22915-517742 units Oral Cap DR Particles TAKE 1 CAPSULE BY MOUTH 3 TIMES DAILY BEFORE MEALS. PLEASE ALSO TAKE 1 CAPSULE WITH LARGE SNACKS.    Eletriptan Hydrobromide 40 MG Oral Tab use at onset; may repeat once after 4 hours- ONLY 2 IN 24 HOUR PERIOD MAX.  This is a 30 day supply.    nortriptyline 25 MG Oral Cap Take 1 capsule (25 mg total) by mouth nightly.    nortriptyline 10 MG Oral Cap Take 1 capsule (10 mg total) by mouth nightly. Take with 25mg at bedtime po for total of 35mg at bedtime po.    FARXIGA 10 MG Oral Tab TAKE 1 TABLET BY MOUTH EVERY DAY    rosuvastatin 20 MG Oral Tab Take 1 tablet (20 mg total) by mouth nightly.    lamoTRIgine 200 MG Oral Tab Take 1 tablet (200 mg total) by mouth 2 (two) times daily.    Atogepant (QULIPTA) 30 MG Oral Tab TAKE 1 TABLET BY MOUTH DAILY    omeprazole 20 MG Oral Capsule Delayed Release Take 1 capsule (20 mg total) by mouth 2 (two) times daily before meals.    ondansetron (ZOFRAN) 4 mg tablet TAKE 1 TABLET BY MOUTH TO BE TAKEN WITH THE FIRST DOSE OF MIGRAINE MEDICATION    dicyclomine 10 MG Oral Cap Take 1 capsule (10 mg total) by mouth 3 (three) times daily as needed (abd cramping).    dexamethasone 2 MG Oral Tab 1 tab with the first dose of  Migraine medication intervention    ketoconazole 2 % External Cream     Blood Glucose Monitoring Suppl (CONTOUR NEXT ONE) Does not apply Device 1 Device daily.    Glucose Blood (CONTOUR NEXT TEST) In Vitro Strip Test blood glucose twice daily    Microlet Lancets Does not apply Misc Test blood glucose twice daily    folic acid 400 MCG Oral Tab Take 1 tablet (400 mcg total) by mouth daily.    Fluticasone Propionate 50 MCG/ACT Nasal Suspension USE 1 SPRAY IN EACH NOSTRIL TWICE A DAY     No current facility-administered medications on file prior to visit.       CMP  (most recent labs)   Lab Results   Component Value Date/Time     (H) 09/25/2023 09:23 AM    BUN 20 (H) 09/25/2023 09:23 AM    CREATSERUM 0.80 09/25/2023 09:23 AM    GFRNAA 44 (L) 07/29/2022 11:53 AM    GFRAA 51 (L) 07/29/2022 11:53 AM    EGFRCR 85 09/25/2023 09:23 AM    CA 9.9 09/25/2023 09:23 AM    ALKPHO 87 09/25/2023 09:23 AM    AST 28 09/25/2023 09:23 AM    ALT 30 09/25/2023 09:23 AM    BILT 0.3 09/25/2023 09:23 AM    TP 7.3 09/25/2023 09:23 AM    ALB 4.0 09/25/2023 09:23 AM     09/25/2023 09:23 AM    K 4.5 09/25/2023 09:23 AM     09/25/2023 09:23 AM    CO2 30.0 09/25/2023 09:23 AM           Lipids  (most recent labs)   Lab Results   Component Value Date/Time    CHOLEST 146 09/25/2023 09:23 AM    TRIG 114 09/25/2023 09:23 AM    HDL 36 (L) 09/25/2023 09:23 AM    LDL 89 09/25/2023 09:23 AM    NONHDLC 110 09/25/2023 09:23 AM          Diabetes  (most recent labs)   Lab Results   Component Value Date/Time    A1C 8.5 (A) 01/29/2024 11:09 AM          Microalb (most recent labs)   Lab Results   Component Value Date/Time    MICROALBCREA 20.9 09/25/2023 09:23 AM        Lab results reviewed with patient.    REVIEW OF SYSTEMS:   GENERAL HEALTH: feels stressed  SKIN: denies any unusual skin lesions or rashes  RESPIRATORY: denies shortness of breath with exertion  CARDIOVASCULAR: denies chest pain on exertion  GI: denies abdominal pain and denies  heartburn  NEURO: denies headaches     EXAM:   Physical Exam  Constitutional:       Appearance: Normal appearance.   Pulmonary:      Comments: Able to speak in complete sentences without difficulty  Neurological:      Mental Status: She is alert and oriented to person, place, and time.   Psychiatric:         Mood and Affect: Mood normal.         Behavior: Behavior normal.         ASSESSMENT AND PLAN:   Diabetes:  Patient to continue Humalog sliding scale: 150-200 mg/dl = 2 units, 201-250 mg/dl = 4 units, 251-300 mg/dl = 6 units, 301-350 mg/dl = 8 units, > 350 mg/dl = 10 units tid w/meals and increase Tresiba to 12 units injection daily.  Patient to continue to use personal Freestyle Ale CGM for glucose monitoring.  Discussed self monitoring blood glucose and the importance of monitoring.  Patient agreed to monitoring qid - before meals and 2 hours postprandial of one meal per day if not using CGM.  Hypoglycemia S&S, Rx, and when to call APRN/CDE reviewed using Rule of 15's. Stressed need to call if 2 readings below 80 mg/dl in 1 week for medication adjustment.   Reinforced healthy eating in healthy portions and increasing daily physical activity.  Patient to meet with dietitian for MNT.    Hyperlipidemia:  Patient to continue statin therapy      DM Health Maintenance  Last dilated eye exam: Last Dilated Eye Exam: 23   Exam shows retinopathy? Eye Exam shows Diabetic Retinopathy?: No    Last diabetic foot exam: Last Foot Exam: 24    Date of last PHQ-2 depression screen: PHQ-2 - Date of last depression screenin2024    Patient  reports that she has never smoked. She has never been exposed to tobacco smoke. She has never used smokeless tobacco.  When is flu vaccine due? Influenza Vaccine(1) due on 10/01/2023  When is pneumonia vaccine due? No recommendations at this time    The patient indicates understanding of these issues and agrees to the plan.  Refills addressed at time of office  visit.    Diagnoses and all orders for this visit:    Type 2 diabetes mellitus with hyperglycemia, with long-term current use of insulin (HCC)  -     Cancel: Endocrine Referral - In Network  -     Endocrine Referral - In Network    Primary hypertension    Mixed hyperlipidemia       Return in about 4 weeks (around 3/22/2024) for Personal CGM, Virtual Visit.    The risks and benefits of my recommendations, as well as other treatment options were discussed with the patient today. questions were answered to the best of my knowledge.  Patient verbalizes understanding and amendable to plan of care.  Duration of this service:  13 minutes   Lenore ESPINAL, BC-ADM, CDCES

## 2024-02-25 ENCOUNTER — PATIENT MESSAGE (OUTPATIENT)
Facility: CLINIC | Age: 59
End: 2024-02-25

## 2024-02-26 RX ORDER — FLUCYTOSINE 100 %
POWDER (GRAM) MISCELLANEOUS
Qty: 1 EACH | Refills: 0 | Status: SHIPPED | OUTPATIENT
Start: 2024-02-26

## 2024-02-26 NOTE — TELEPHONE ENCOUNTER
RX 16% flucytosine cream, apply 5g nightly for 14 days fax to [facility].

## 2024-02-26 NOTE — TELEPHONE ENCOUNTER
Vulva less swollen, pt walked a lot this weekend, painful to insert vaginal suppository. Denies abrasion or bleeding. Currently using Boric acid suppository and fluconazole oral tablets. Used Aquaphor, takes ibuprofen 400 mg once/twice a day. No vaginal discharge, itching.     Can use estradiol cream as prescribed.    Pt is a floor nurse, walks a lot.     Wants to know if she needs 3rd dose of fluconazole.     Will notify pt of Dr. Hoyt's response.     Patient verbalized understanding, agreed to and intend to comply with plan of care.

## 2024-02-27 ENCOUNTER — TELEPHONE (OUTPATIENT)
Facility: CLINIC | Age: 59
End: 2024-02-27

## 2024-02-27 NOTE — TELEPHONE ENCOUNTER
Received fax from [facility] for quantity verification needs to be 4 tablet-routed to Dr. Hoyt for review:  Ibrexafungerp Citrate 150 MG Oral Tab () 2 tablet 0 2024   Sig:   Take 300 mg by mouth in the morning and 300 mg before bedtime. Do all this for 1 day.     Route:   Oral

## 2024-02-28 ENCOUNTER — TELEPHONE (OUTPATIENT)
Dept: NEUROLOGY | Facility: CLINIC | Age: 59
End: 2024-02-28

## 2024-02-28 ENCOUNTER — OFFICE VISIT (OUTPATIENT)
Dept: NEUROLOGY | Facility: CLINIC | Age: 59
End: 2024-02-28
Payer: COMMERCIAL

## 2024-02-28 VITALS
BODY MASS INDEX: 24.99 KG/M2 | WEIGHT: 150 LBS | DIASTOLIC BLOOD PRESSURE: 79 MMHG | HEIGHT: 65 IN | SYSTOLIC BLOOD PRESSURE: 133 MMHG | RESPIRATION RATE: 16 BRPM | HEART RATE: 87 BPM

## 2024-02-28 DIAGNOSIS — G43.709 CHRONIC MIGRAINE W/O AURA W/O STATUS MIGRAINOSUS, NOT INTRACTABLE: ICD-10-CM

## 2024-02-28 DIAGNOSIS — G43.711 MIGRAINE, CHRONIC, WITHOUT AURA, INTRACTABLE, WITH STATUS MIGRAINOSUS: Primary | ICD-10-CM

## 2024-02-28 PROCEDURE — 64615 CHEMODENERV MUSC MIGRAINE: CPT | Performed by: OTHER

## 2024-02-28 NOTE — PATIENT INSTRUCTIONS
Refill policies:    Allow 2-3 business days for refills; controlled substances may take longer.  Contact your pharmacy at least 5 days prior to running out of medication and have them send an electronic request or submit request through the “request refill” option in your [employer] account.  Refills are not addressed on weekends; covering physicians do not authorize routine medications on weekends.  No narcotics or controlled substances are refilled after noon on Fridays or by on call physicians.  By law, narcotics must be electronically prescribed.  A 30 day supply with no refills is the maximum allowed.  If your prescription is due for a refill, you may be due for a follow up appointment.  To best provide you care, patients receiving routine medications need to be seen at least once a year.  Patients receiving narcotic/controlled substance medications need to be seen at least once every 3 months.  In the event that your preferred pharmacy does not have the requested medication in stock (e.g. Backordered), it is your responsibility to find another pharmacy that has the requested medication available.  We will gladly send a new prescription to that pharmacy at your request.    Scheduling Tests:    If your physician has ordered radiology tests such as MRI or CT scans, please contact Central Scheduling at [phone] right away to schedule the test.  Once scheduled, the [facility] Centralized Referral Team will work with your insurance carrier to obtain pre-certification or prior authorization.  Depending on your insurance carrier, approval may take 3-10 days.  It is highly recommended patients assure they have received an authorization before having a test performed.  If test is done without insurance authorization, patient may be responsible for the entire amount billed.      Precertification and Prior Authorizations:  If your physician has recommended that you have a procedure or additional testing performed the [facility]  Centralized Referral Team will contact your insurance carrier to obtain pre-certification or prior authorization.    You are strongly encouraged to contact your insurance carrier to verify that your procedure/test has been approved and is a COVERED benefit.  Although the [facility] Centralized Referral Team does its due diligence, the insurance carrier gives the disclaimer that \"Although the procedure is authorized, this does not guarantee payment.\"    Ultimately the patient is responsible for payment.   Thank you for your understanding in this matter.  Paperwork Completion:  If you require FMLA or disability paperwork for your recovery, please make sure to either drop it off or have it faxed to our office at [phone]. Be sure the form has your name and date of birth on it.  The form will be faxed to our Forms Department and they will complete it for you.  There is a 25$ fee for all forms that need to be filled out.  Please be aware there is a 10-14 day turnaround time.  You will need to sign a release of information (EVANGELINA) form if your paperwork does not come with one.  You may call the Forms Department with any questions at [phone].  Their fax number is [phone].

## 2024-02-28 NOTE — PROGRESS NOTES
Paperwork noting that patient may bear financial responsibility for procedure(s) performed in clinic today signed prior to proceeding with procedure(s).    Furthermore, patient notified that they should contact their insurer to verify that your procedure/test has been approved and is a COVERED benefit.  Although the [facility] staff does its due diligence, the insurance carrier gives the disclaimer that \"Although the procedure is authorized, this does not guarantee payment.\"    Ultimately the patient is responsible for payment.    Botox is:  [x] Buy and Bill  [] Patient Supplied      Botox Reauthorization Questions:  Has the patient experienced a reduction in frequency of migraines since starting Botox? yes  If yes, by what percentage? 50%  Has the intensity of migraines decreased since starting Botox? yes  If yes, by what percentage? 50%

## 2024-02-28 NOTE — TELEPHONE ENCOUNTER
Patient needs new prior authorization with new insurance.    Patient had Botox today and didn't inform office of insurance change.    This is a renewal request.

## 2024-02-28 NOTE — PROGRESS NOTES
[facility]  Affiliated with [facility]  2/28/2024  CHRONIC MIGRAINE - BOTOX Injection  CPT: 21633    Vero Michel is a(n) 58 year old female with chronic migraine.  Patient is here for Botox injection.      ( x ) Headaches are frequent and based on screening procedures, satisfies criteria of chronic migraine:  >15 days a month, each occurrence can be > 4 hours duration.   Note is made that she has tried 2 or more prophylactic treatment in the past and has not responded that is why we are using Botox.      ( x ) Headaches have responded well to previous Botox injection.   Follow up injection necessary because headaches are recurring.  Patient is likewise familiar with the risks. These were reviewed and patient requests to proceed.    ROS:  No additional issues added after completing 10 point ROS      Current Outpatient Medications:     FLUCYTOSINE Does not apply Powder, 16% flucytosine cream, apply 5g nightly for 14 days, Disp: 1 each, Rfl: 0    Omeprazole 40 MG Oral Capsule Delayed Release, Take 1 capsule (40 mg total) by mouth daily., Disp: 90 capsule, Rfl: 0    ALPRAZolam 1 MG Oral Tab, Take 1 tablet (1 mg total) by mouth 2 (two) times daily as needed for Sleep or Anxiety., Disp: 60 tablet, Rfl: 3    Insulin Degludec (TRESIBA FLEXTOUCH) 100 UNIT/ML Subcutaneous Solution Pen-injector, Inject daily as directed up TDD = 20 units, Disp: 15 mL, Rfl: 0    Insulin Lispro, 1 Unit Dial, (HUMALOG KWIKPEN) 100 UNIT/ML Subcutaneous Solution Pen-injector, Inject 3x/day with meals as directed up to TDD = 20 units, Disp: 15 mL, Rfl: 0    Insulin Pen Needle (BD PEN NEEDLE ARCHANA U/F) 32G X 4 MM Does not apply Misc, Inject 1 Pen into the skin 4 (four) times daily. Use to inject insulin 4x/day as directed, Disp: 200 each, Rfl: 3    FREESTYLE TANIKA 3 SENSOR Does not apply Misc, 1 each every 14 (fourteen) days., Disp: 2 each, Rfl: 11    estradiol 0.1 MG/GM Vaginal Cream, 1 gram per  vagina nightly for 2 weeks, then 1 gram per vagina three times per week for at least 10 weeks. May taper to 0.5 grams (pea-sized amount) nightly thereafter., Disp: 1 each, Rfl: 3    ZENPEP 50292-327673 units Oral Cap DR Particles, TAKE 1 CAPSULE BY MOUTH 3 TIMES DAILY BEFORE MEALS. PLEASE ALSO TAKE 1 CAPSULE WITH LARGE SNACKS., Disp: , Rfl:     Eletriptan Hydrobromide 40 MG Oral Tab, use at onset; may repeat once after 4 hours- ONLY 2 IN 24 HOUR PERIOD MAX.  This is a 30 day supply., Disp: 8 tablet, Rfl: 11    nortriptyline 25 MG Oral Cap, Take 1 capsule (25 mg total) by mouth nightly., Disp: 90 capsule, Rfl: 1    nortriptyline 10 MG Oral Cap, Take 1 capsule (10 mg total) by mouth nightly. Take with 25mg at bedtime po for total of 35mg at bedtime po., Disp: 90 capsule, Rfl: 1    FARXIGA 10 MG Oral Tab, TAKE 1 TABLET BY MOUTH EVERY DAY, Disp: 90 tablet, Rfl: 1    rosuvastatin 20 MG Oral Tab, Take 1 tablet (20 mg total) by mouth nightly., Disp: 90 tablet, Rfl: 1    lamoTRIgine 200 MG Oral Tab, Take 1 tablet (200 mg total) by mouth 2 (two) times daily., Disp: 180 tablet, Rfl: 1    Atogepant (QULIPTA) 30 MG Oral Tab, TAKE 1 TABLET BY MOUTH DAILY, Disp: 90 tablet, Rfl: 3    ondansetron (ZOFRAN) 4 mg tablet, TAKE 1 TABLET BY MOUTH TO BE TAKEN WITH THE FIRST DOSE OF MIGRAINE MEDICATION, Disp: 30 tablet, Rfl: 2    dicyclomine 10 MG Oral Cap, Take 1 capsule (10 mg total) by mouth 3 (three) times daily as needed (abd cramping)., Disp: 90 capsule, Rfl: 1    ketoconazole 2 % External Cream, , Disp: , Rfl:     Microlet Lancets Does not apply Misc, Test blood glucose twice daily, Disp: 200 each, Rfl: 11    folic acid 400 MCG Oral Tab, Take 1 tablet (400 mcg total) by mouth daily., Disp: , Rfl:     Fluticasone Propionate 50 MCG/ACT Nasal Suspension, USE 1 SPRAY IN EACH NOSTRIL TWICE A DAY, Disp: , Rfl:       /79 (BP Location: Left arm, Patient Position: Sitting, Cuff Size: adult)   Pulse 87   Resp 16   Ht 65\"   Wt 150 lb  (68 kg)   LMP 10/21/2012 (LMP Unknown)   BMI 24.96 kg/m²   HENT:  Pink conjunctiva, anicteric sclerae, moist mucosa  Neck: No adenopathy or thyromegaly  Heart S1S2, no murmur  Lungs are clear, no wheezing  Extremities: no cyanosis or edema      PROCEDURE:  Assisted by: CMA or RN in room  BOTOX injection for chronic migraine:  Using alcohol prep, fixed site protocol performed.  Botox was reconstituted to the following concentration:  5 units per 0.1 ml.      Muscles, number of sites, units used and Side injected were as follows:                                                 Units used     Side  Procerus   5 units        center  Corrugators 2 sites  10 units      R/L  Frontalis   4 sites  20 units      R/L  Temporalis   4 sites each side  40 units      R/L  Occipitalis:            3 sites each side             30 units      R/L        Cervical paraspinals   2 sites each side 20 units      R/L  Upper trapezius   3 sites each side 30 units      R/L                                  TOTAL       155 units  Discarded:  45 units          IMPRESSION:  1. Migraine, chronic, without aura, intractable, with status migrainosus    2. Chronic migraine w/o aura w/o status migrainosus, not intractable        Post injections instructions:  Expect response to start on the second or going into the 3rd week  Use ice packs and Tylenol ES if with pain at injections sites  Report any signs of infection or inflammation at site of injection  Use migraine medications the same was as before    After procedure check out process by PSRs and rooming RN/MA who were present during the procedure to assist.        Foster Landis MD  Vascular & General Neurology  [facility]

## 2024-02-28 NOTE — TELEPHONE ENCOUNTER
Faxed prior authorization form for [employer] to the [place] office for provider signature.

## 2024-02-29 ENCOUNTER — TELEPHONE (OUTPATIENT)
Facility: CLINIC | Age: 59
End: 2024-02-29

## 2024-02-29 NOTE — TELEPHONE ENCOUNTER
Spoke with pt. Discussed trying boric acid x 14 days. Pt states she has tried boric acid & fluconazole with some relief but still very uncomfortable. Asking if there is anything else she try since the compound cream is so expensive.     Offered an appointment to have sutures trimmed. She said the biopsy site has \"yellowish lines on the skin\". Not sure if that is the sutures or if she should be seen sooner then 4/5/24.    Will route to Dr. Hoyt and call with recommendations. Verbalized understanding.

## 2024-02-29 NOTE — TELEPHONE ENCOUNTER
Spoke with pt. She will try the clobetasol ointment to see if it gives her some relief. Will call the compounding pharmacy to see if they can do the nystatin suppositories and let her know. Pt scheduled 3/6/24 for a suture check. Verbalized understanding.

## 2024-03-06 ENCOUNTER — OFFICE VISIT (OUTPATIENT)
Dept: OBGYN CLINIC | Facility: CLINIC | Age: 59
End: 2024-03-06
Payer: COMMERCIAL

## 2024-03-06 VITALS
HEART RATE: 91 BPM | HEIGHT: 65 IN | WEIGHT: 149 LBS | SYSTOLIC BLOOD PRESSURE: 126 MMHG | DIASTOLIC BLOOD PRESSURE: 85 MMHG | BODY MASS INDEX: 24.83 KG/M2

## 2024-03-06 DIAGNOSIS — N76.0 VAGINITIS AND VULVOVAGINITIS: Primary | ICD-10-CM

## 2024-03-06 PROCEDURE — 99213 OFFICE O/P EST LOW 20 MIN: CPT | Performed by: STUDENT IN AN ORGANIZED HEALTH CARE EDUCATION/TRAINING PROGRAM

## 2024-03-06 NOTE — PROGRESS NOTES
[facility]  Obstetrics and Gynecology   Established Patient Visit    Chief complaint:   Chief Complaint   Patient presents with    Other     Suture check         HPI: Vero Michel is a 58 year old  with Patient's last menstrual period was 10/21/2012 (lmp unknown).    Pt seen 24 with severe vulvar irritation - on exam had appearance of inflammatory vulvar condition (ie lichen sclerosus) vs possible severe fungal infection or contact dermatitis  Biopsy done - NO lichen sclerosus, but there was superficial fungal elements morphologically c/w Candida species  Vaginitis swab resulted with both candida albicans and candida glabrata  Pt was advised to use clobetasol for inflammation, rxed fluconazole and boric acid suppositories for glabrata  Hasnoticed some improvement since  now but stitches very irritated, hard to walk  Wasn't able to use boric acid because of the irritation. I rxed compounded flucytosine but was 1000$ so pt did not get. We also tried to rx nystatin suppositories but RN unable to confirm with pharmacy      Meds:  Current Outpatient Medications on File Prior to Visit   Medication Sig Dispense Refill    FLUCYTOSINE Does not apply Powder 16% flucytosine cream, apply 5g nightly for 14 days 1 each 0    Omeprazole 40 MG Oral Capsule Delayed Release Take 1 capsule (40 mg total) by mouth daily. 90 capsule 0    ALPRAZolam 1 MG Oral Tab Take 1 tablet (1 mg total) by mouth 2 (two) times daily as needed for Sleep or Anxiety. 60 tablet 3    Insulin Degludec (TRESIBA FLEXTOUCH) 100 UNIT/ML Subcutaneous Solution Pen-injector Inject daily as directed up TDD = 20 units 15 mL 0    Insulin Lispro, 1 Unit Dial, (HUMALOG KWIKPEN) 100 UNIT/ML Subcutaneous Solution Pen-injector Inject 3x/day with meals as directed up to TDD = 20 units 15 mL 0    Insulin Pen Needle (BD PEN NEEDLE ARCHANA U/F) 32G X 4 MM Does not apply Misc Inject 1 Pen into the skin 4 (four) times daily. Use to inject insulin 4x/day as  directed 200 each 3    FREESTYLE TANIKA 3 SENSOR Does not apply Misc 1 each every 14 (fourteen) days. 2 each 11    estradiol 0.1 MG/GM Vaginal Cream 1 gram per vagina nightly for 2 weeks, then 1 gram per vagina three times per week for at least 10 weeks. May taper to 0.5 grams (pea-sized amount) nightly thereafter. 1 each 3    ZENPEP 80213-688729 units Oral Cap DR Particles TAKE 1 CAPSULE BY MOUTH 3 TIMES DAILY BEFORE MEALS. PLEASE ALSO TAKE 1 CAPSULE WITH LARGE SNACKS.      Eletriptan Hydrobromide 40 MG Oral Tab use at onset; may repeat once after 4 hours- ONLY 2 IN 24 HOUR PERIOD MAX.  This is a 30 day supply. 8 tablet 11    nortriptyline 25 MG Oral Cap Take 1 capsule (25 mg total) by mouth nightly. 90 capsule 1    nortriptyline 10 MG Oral Cap Take 1 capsule (10 mg total) by mouth nightly. Take with 25mg at bedtime po for total of 35mg at bedtime po. 90 capsule 1    FARXIGA 10 MG Oral Tab TAKE 1 TABLET BY MOUTH EVERY DAY 90 tablet 1    rosuvastatin 20 MG Oral Tab Take 1 tablet (20 mg total) by mouth nightly. 90 tablet 1    lamoTRIgine 200 MG Oral Tab Take 1 tablet (200 mg total) by mouth 2 (two) times daily. 180 tablet 1    Atogepant (QULIPTA) 30 MG Oral Tab TAKE 1 TABLET BY MOUTH DAILY 90 tablet 3    ondansetron (ZOFRAN) 4 mg tablet TAKE 1 TABLET BY MOUTH TO BE TAKEN WITH THE FIRST DOSE OF MIGRAINE MEDICATION 30 tablet 2    dicyclomine 10 MG Oral Cap Take 1 capsule (10 mg total) by mouth 3 (three) times daily as needed (abd cramping). 90 capsule 1    ketoconazole 2 % External Cream       Microlet Lancets Does not apply Misc Test blood glucose twice daily 200 each 11    folic acid 400 MCG Oral Tab Take 1 tablet (400 mcg total) by mouth daily.      Fluticasone Propionate 50 MCG/ACT Nasal Suspension USE 1 SPRAY IN EACH NOSTRIL TWICE A DAY       No current facility-administered medications on file prior to visit.         PHYSICAL EXAM:     Vitals:    03/06/24 0947   BP: 126/85   Pulse: 91   Weight: 149 lb (67.6 kg)    Height: 65\"       Body mass index is 24.79 kg/m².    Exam:  General: NAD, appears well  Pelvic exam: the diffuse erythema that had been present before is now just limited to vestibule  Suture material protruding from healed biopsy sites - trimmed & removed    Assessment & Plan:     Vero Michel is a 58 year old  female here for follow up     Diagnoses and all orders for this visit:    Vaginitis and vulvovaginitis       Hopefully trimming suture will improve irritation and then she may try boric acid suppositories again  Will also double check on whether nystatin suppository may be filled with compounding pharmacy - Suzanne HEDRICK going to check with [facility] now    D/w pt that if we cannot get improvement with these agents may need to see infectious disease since I cannot find any other medication options for the candida glabrata besides boric acid, flucytosine, nystatin      Nataly Hoyt MD  [facility] - OBGYN

## 2024-03-14 NOTE — TELEPHONE ENCOUNTER
Received Certain approval for Botox for 2024.    Approval #[id number] from 02/28/24-02/26/25-4 visits.    This is Buy and Bill.

## 2024-03-22 ENCOUNTER — TELEMEDICINE (OUTPATIENT)
Dept: ENDOCRINOLOGY CLINIC | Facility: CLINIC | Age: 59
End: 2024-03-22
Payer: COMMERCIAL

## 2024-03-22 ENCOUNTER — PATIENT MESSAGE (OUTPATIENT)
Dept: NEUROLOGY | Facility: CLINIC | Age: 59
End: 2024-03-22

## 2024-03-22 DIAGNOSIS — Z79.4 TYPE 2 DIABETES MELLITUS WITH HYPERGLYCEMIA, WITH LONG-TERM CURRENT USE OF INSULIN (HCC): Primary | ICD-10-CM

## 2024-03-22 DIAGNOSIS — E11.65 TYPE 2 DIABETES MELLITUS WITH HYPERGLYCEMIA, WITH LONG-TERM CURRENT USE OF INSULIN (HCC): Primary | ICD-10-CM

## 2024-03-22 DIAGNOSIS — G43.709 CHRONIC MIGRAINE W/O AURA W/O STATUS MIGRAINOSUS, NOT INTRACTABLE: Primary | ICD-10-CM

## 2024-03-22 NOTE — PROGRESS NOTES
HPI:   Vero Michel is a 58 year old female who presents virtually for the management of her diabetes.   This visit is conducted using Telemedicine with live, two way, interactive video and audio.  Patient verbally consents to Telemedicine visit.  Patient understands and accepts financial responsibility for any deductible, co-insurance and/or co-pays associated with this service.    Chief Complaint: Diabetes Follow Up    Diabetes: needs improvement  Type: 2   Duration:> 15 years  Current Meds: Tresiba 12 units injection daily, Humalog sliding scale: 150-200 mg/dl = 2 units, 201-250 mg/dl = 4 units, 251-300 mg/dl = 6 units, 301-350 mg/dl = 8 units, > 350 mg/dl = 10 units tid w/meals   Long term insulin use: yes  Failed Meds/Previous Tx: glimepiride, Januvia, Jardiance - mycotic infection, pioglitazone, Metformin, Trulicity    Complications: Proteinuria, CKD stage 3, TAMEKA    Personal Freestyle Ale CGM  Analysis of data: 3/8/2024 - 3/21/2024  % Time CGM is Active: 81%  Sensor download: full report  in media  Average glucose : 173 mg/dl   GMI: 7.4%    CV (coefficient of variation) : 19.3%     31% time above 180mg /dl   3% time above 250 mg/dl  66% time in target range:  mg/dl   0% time below target range: 70mg/dl     Evaluation   1. Baseline glucose stable and in target range but somewhat elevated  2. Occasional evening postprandial elevations with return to baseline  3. Low likelihood of hypoglycemia         Overall glucose control:   HGBA1C:    Lab Results   Component Value Date    A1C 8.5 (A) 01/29/2024    A1C 7.9 (A) 08/31/2023    A1C 6.2 (A) 02/06/2023     (H) 10/13/2022          Wt Readings from Last 3 Encounters:   03/06/24 149 lb (67.6 kg)   02/28/24 150 lb (68 kg)   02/20/24 150 lb (68 kg)           Past History:   She  has a past medical history of Anxiety, Arthritis (01/01/2000), Back pain (01/01/2010), Constipation (04/01/2022), Depression, Diabetes (HCC), Diabetes mellitus (HCC)  (01/01/2005), Fatigue (01/01/2001), Flatulence/gas pain/belching (01/01/2000), Frequent use of laxatives (04/01/2022), H/O degenerative disc disease, Headache disorder (01/01/2000), High cholesterol (01/01/2010), History of depression (01/01/1990), History of mental disorder (01/01/1990), motion sickness, Hyperlipidemia, Hypertension, Lichen sclerosus, Loss of appetite (02/01/2022), Lumbar disc herniation, Migraines, Nausea (02/01/2022), Nausea and/or vomiting (05/02/2023), Osteoarthritis, Pain in joints (01/01/2010), Pain with bowel movements (04/01/2022), Problems with swallowing, S/P hip replacement, left, Sleep apnea (01/01/2000), Sleep disturbance (01/01/2005), Stress, Type 2 diabetes mellitus (HCC), Uncomfortable fullness after meals (02/01/2022), Visual impairment, Vomiting (02/01/2022), Wears glasses (01/01/1990), and Weight loss (05/01/2022).   Her family history includes Breast Cancer (age of onset: 50) in her paternal grandmother; Cancer (age of onset: 73) in her father; Diabetes in her maternal grandfather; Hypertension in her mother, sister, and another family member; Lipids in an other family member; Migraines in her daughter; Prostate Cancer in her father; Psychiatric in her mother.   She  reports that she has never smoked. She has never been exposed to tobacco smoke. She has never used smokeless tobacco. She reports current alcohol use. She reports that she does not use drugs.     She is allergic to topiramate.     Current Outpatient Medications on File Prior to Visit   Medication Sig    FLUCYTOSINE Does not apply Powder 16% flucytosine cream, apply 5g nightly for 14 days    Omeprazole 40 MG Oral Capsule Delayed Release Take 1 capsule (40 mg total) by mouth daily.    ALPRAZolam 1 MG Oral Tab Take 1 tablet (1 mg total) by mouth 2 (two) times daily as needed for Sleep or Anxiety.    Insulin Degludec (TRESIBA FLEXTOUCH) 100 UNIT/ML Subcutaneous Solution Pen-injector Inject daily as directed up TDD = 20  units    Insulin Lispro, 1 Unit Dial, (HUMALOG KWIKPEN) 100 UNIT/ML Subcutaneous Solution Pen-injector Inject 3x/day with meals as directed up to TDD = 20 units    Insulin Pen Needle (BD PEN NEEDLE ARCHANA U/F) 32G X 4 MM Does not apply Misc Inject 1 Pen into the skin 4 (four) times daily. Use to inject insulin 4x/day as directed    FREESTYLE TANIKA 3 SENSOR Does not apply Misc 1 each every 14 (fourteen) days.    estradiol 0.1 MG/GM Vaginal Cream 1 gram per vagina nightly for 2 weeks, then 1 gram per vagina three times per week for at least 10 weeks. May taper to 0.5 grams (pea-sized amount) nightly thereafter.    ZENPEP 56981-806134 units Oral Cap DR Particles TAKE 1 CAPSULE BY MOUTH 3 TIMES DAILY BEFORE MEALS. PLEASE ALSO TAKE 1 CAPSULE WITH LARGE SNACKS.    Eletriptan Hydrobromide 40 MG Oral Tab use at onset; may repeat once after 4 hours- ONLY 2 IN 24 HOUR PERIOD MAX.  This is a 30 day supply.    nortriptyline 25 MG Oral Cap Take 1 capsule (25 mg total) by mouth nightly.    nortriptyline 10 MG Oral Cap Take 1 capsule (10 mg total) by mouth nightly. Take with 25mg at bedtime po for total of 35mg at bedtime po.    FARXIGA 10 MG Oral Tab TAKE 1 TABLET BY MOUTH EVERY DAY    rosuvastatin 20 MG Oral Tab Take 1 tablet (20 mg total) by mouth nightly.    lamoTRIgine 200 MG Oral Tab Take 1 tablet (200 mg total) by mouth 2 (two) times daily.    Atogepant (QULIPTA) 30 MG Oral Tab TAKE 1 TABLET BY MOUTH DAILY    ondansetron (ZOFRAN) 4 mg tablet TAKE 1 TABLET BY MOUTH TO BE TAKEN WITH THE FIRST DOSE OF MIGRAINE MEDICATION    dicyclomine 10 MG Oral Cap Take 1 capsule (10 mg total) by mouth 3 (three) times daily as needed (abd cramping).    ketoconazole 2 % External Cream     Microlet Lancets Does not apply Misc Test blood glucose twice daily    folic acid 400 MCG Oral Tab Take 1 tablet (400 mcg total) by mouth daily.    Fluticasone Propionate 50 MCG/ACT Nasal Suspension USE 1 SPRAY IN EACH NOSTRIL TWICE A DAY     No current  facility-administered medications on file prior to visit.       CMP  (most recent labs)   Lab Results   Component Value Date/Time     (H) 09/25/2023 09:23 AM    BUN 20 (H) 09/25/2023 09:23 AM    CREATSERUM 0.80 09/25/2023 09:23 AM    GFRNAA 44 (L) 07/29/2022 11:53 AM    GFRAA 51 (L) 07/29/2022 11:53 AM    EGFRCR 85 09/25/2023 09:23 AM    CA 9.9 09/25/2023 09:23 AM    ALKPHO 87 09/25/2023 09:23 AM    AST 28 09/25/2023 09:23 AM    ALT 30 09/25/2023 09:23 AM    BILT 0.3 09/25/2023 09:23 AM    TP 7.3 09/25/2023 09:23 AM    ALB 4.0 09/25/2023 09:23 AM     09/25/2023 09:23 AM    K 4.5 09/25/2023 09:23 AM     09/25/2023 09:23 AM    CO2 30.0 09/25/2023 09:23 AM           Lipids  (most recent labs)   Lab Results   Component Value Date/Time    CHOLEST 146 09/25/2023 09:23 AM    TRIG 114 09/25/2023 09:23 AM    HDL 36 (L) 09/25/2023 09:23 AM    LDL 89 09/25/2023 09:23 AM    NONHDLC 110 09/25/2023 09:23 AM          Diabetes  (most recent labs)   Lab Results   Component Value Date/Time    A1C 8.5 (A) 01/29/2024 11:09 AM          Microalb (most recent labs)   Lab Results   Component Value Date/Time    MICROALBCREA 20.9 09/25/2023 09:23 AM        Lab results reviewed with patient.    REVIEW OF SYSTEMS:   GENERAL HEALTH: feels stressed  SKIN: denies any unusual skin lesions or rashes  RESPIRATORY: denies shortness of breath with exertion  CARDIOVASCULAR: denies chest pain on exertion  GI: denies abdominal pain and denies heartburn  NEURO: denies headaches     EXAM:   Physical Exam  Constitutional:       Appearance: Normal appearance.   Pulmonary:      Comments: Able to speak in complete sentences without difficulty  Neurological:      Mental Status: She is alert and oriented to person, place, and time.   Psychiatric:         Mood and Affect: Mood normal.         Behavior: Behavior normal.         ASSESSMENT AND PLAN:   Diabetes:  Patient to continue Humalog sliding scale: 150-200 mg/dl = 2 units, 201-250 mg/dl = 4  units, 251-300 mg/dl = 6 units, 301-350 mg/dl = 8 units, > 350 mg/dl = 10 units tid w/meals and increase Tresiba to 15 units injection daily.  Patient to continue to use personal Freestyle Ale CGM for glucose monitoring.  Discussed self monitoring blood glucose and the importance of monitoring.  Patient agreed to monitoring qid - before meals and 2 hours postprandial of one meal per day if not using CGM.  Hypoglycemia S&S, Rx, and when to call APRN/CDE reviewed using Rule of 15's. Stressed need to call if 2 readings below 80 mg/dl in 1 week for medication adjustment.   Reinforced healthy eating in healthy portions and increasing daily physical activity.  Patient to meet with dietitian for MNT.      DM Health Maintenance  Last dilated eye exam: Last Dilated Eye Exam: 23   Exam shows retinopathy? Eye Exam shows Diabetic Retinopathy?: No    Last diabetic foot exam: Last Foot Exam: 24    Date of last PHQ-2 depression screen: PHQ-2 - Date of last depression screenin2024    Patient  reports that she has never smoked. She has never been exposed to tobacco smoke. She has never used smokeless tobacco.  When is flu vaccine due? Influenza Vaccine(1) due on 10/01/2023  When is pneumonia vaccine due? No recommendations at this time    The patient indicates understanding of these issues and agrees to the plan.  Refills addressed at time of office visit.    Diagnoses and all orders for this visit:    Type 2 diabetes mellitus with hyperglycemia, with long-term current use of insulin (HCC)  -     Hemoglobin A1C [E]; Future         Return in about 5 weeks (around 2024) for 45 minute appointment, Personal CGM, Virtual Visit.    The risks and benefits of my recommendations, as well as other treatment options were discussed with the patient today. questions were answered to the best of my knowledge.  Patient verbalizes understanding and amendable to plan of care.  Duration of this service:  17 minutes   Lenore  Martinez ESPINAL, BC-ADM, [facility]

## 2024-03-25 RX ORDER — ATOGEPANT 30 MG/1
TABLET ORAL
Qty: 30 TABLET | Refills: 2 | Status: SHIPPED | OUTPATIENT
Start: 2024-03-25

## 2024-03-25 NOTE — TELEPHONE ENCOUNTER
From: Vero Michel  To: Foster Landis  Sent: 3/22/2024 11:38 AM CDT  Subject: Qulipta     [facility] is no longer in my network. I don't know what program I was in there as there was no cost for this drug. If you know of another program let me know otherwise my current pharmacy is [facility] on [address] in [place].     Thank you

## 2024-03-26 ENCOUNTER — PATIENT MESSAGE (OUTPATIENT)
Dept: FAMILY MEDICINE CLINIC | Facility: CLINIC | Age: 59
End: 2024-03-26

## 2024-03-26 ENCOUNTER — TELEPHONE (OUTPATIENT)
Dept: INTERNAL MEDICINE CLINIC | Facility: HOSPITAL | Age: 59
End: 2024-03-26

## 2024-03-26 ENCOUNTER — LAB ENCOUNTER (OUTPATIENT)
Dept: LAB | Age: 59
End: 2024-03-26
Attending: PREVENTIVE MEDICINE
Payer: COMMERCIAL

## 2024-03-26 DIAGNOSIS — Z20.822 SUSPECTED COVID-19 VIRUS INFECTION: Primary | ICD-10-CM

## 2024-03-26 DIAGNOSIS — Z20.822 SUSPECTED COVID-19 VIRUS INFECTION: ICD-10-CM

## 2024-03-26 LAB — SARS-COV-2 RNA RESP QL NAA+PROBE: NOT DETECTED

## 2024-03-26 NOTE — TELEPHONE ENCOUNTER
From: Vero Michel  To: HEATHER JEANINE RIEVRA  Sent: 3/26/2024 9:43 AM CDT  Subject: UTI?    I am home with the flu and I believe I have a UTI now as well. Lower back pain and very frequent urination. No pain with urination but that is not unusual for me. I am miserable. I don't think you want me to come in as I am vomiting.. but if you say so I will.   Sarah

## 2024-03-26 NOTE — TELEPHONE ENCOUNTER
[] EH  [x]JUAN   [] [facility]  Manager : Alexia Graves    [x] Direct Patient Care  []Indirect Patient Contact   [] Non-Clinical/No Patient Contact    High Risk Area:    [] Yes   [x] No    For Direct Patient Care ONLY: Have you been fitted with an N95 mask? [] Yes  [x]No      HAVE YOU RECEIVED THE COVID-19 Vaccine? Yes [x]    No []          If yes, date(s) received:  01/04/2021 02/02/20021 02/11/2022          Which vaccine:  Pfizer []     Moderna [x]    J&J []      SYMPTOMS (reported via dashboard):  [] asymptomatic  [x] symptomatic  [] GI symptoms only    Symptom onset date: 03/24/2024  Fever   > 100F             Yes []      Cough                          Yes []      Shortness of breath  Yes []      Congestion                 Yes []      Runny nose                Yes []        Loss of Smell              Yes []        Loss of Taste             Yes []       Sore throat                 Yes [x]       Fatigue                        Yes [x]       Body Aches                Yes [x]        Chills                           Yes []        Headache                   Yes [x]             GI symptoms             Yes [x]     No []                     Nausea   [x]          Vomiting            [x]                                    Diarrhea  []          Upset stomach [x]      Employee reported COVID Exposure?  Yes []     No [x]    Date of exposure:   []  Coworker                       [] patient                        [] Family/friend    PPE:   [] N95 Mask/PAPR  [] Standard Mask  [] Eyewear  [] None    Within 6 feet for >15 minutes? [] Yes []  No    Is this a true exposure? []  Yes []  No    When was the last shift you worked?: 03/21/2024    Employee has a history of Covid?  Yes [x]     No []   If Yes, when: 11/2022    PLAN:     COVID-19 testing ordered:   [x] Rapid      [] Alinity              Date test is to be taken:   03/26/2024    []  No testing required at this time  []  Outside testing                            Notes:    INSTRUCTIONS PROVIDED:    [x]  Employee was instructed to call Central scheduling at [phone] or use [facility] to make an appointment for their testing   [x]  May return to work if employee views negative result in MyChart and remains fever, vomiting, and diarrhea free  []  May continue to work if remains asymptomatic and views negative result in MyChart  []  Follow up for condition update when resulting  []  If symptoms develop, stay home and call hotline for rapid test order  []  If COVID positive results, off work minimum of 5 days from positive test or onset of symptoms (day 0)     [x]  Plan noted above  [x]  Length of time to obtain results  []  Quarantine instructions  [x]  S/S of worsening infection/condition and importance of prompt medical re-evaluation including when to seek emergency care.   [x] The employee voiced understanding

## 2024-03-27 NOTE — TELEPHONE ENCOUNTER
Results and RTW guidelines:    COVID RESULT:    [x] Viewed by employee in [facility] [employer].  RTW plan and instructions as indicated on triage call.  Manager notified.  Estimated RTW date:   [] Discussed with employee   [x] Unable to reach by phone.  Sent via [facility] [employer] message      Test type:    [x] Rapid         [] Alinity         [] Outside test:       [x] NEGATIVE     Ordered Alinity retest?  []Yes   [x] No (skip to RTW)   Ordered Rapid retest?   []Yes   [x] No (skip to RTW)           Dated to be taken:      If Yes, PLACE ORDER NOW and instruct the following:  -Originally Symptomatic or Now Symptoms:   -RTW when sx improve- fever free for 24 hours w/o medications, Diarrhea/Vomiting for 24 hours w/o medications    -Originally  Asymptomatic  -Asymptomatic AND Vaccinated or Unvaccinated or Prior infection in past 90 days:   -May work and continue to monitor symptoms for the next 10 days.                                         -Alinity test day 2, Alinity test day 5 (day 0 - exposure)      Notes:     RTW PLAN:    []  If COVID positive results, off work minimum of 5 days from positive test or onset of symptoms (day 0)        On day 5, if asymptomatic or mildly symptomatic (with improving symptoms) may return to work day 6          On day 5, if symptomatic, call Employee Health for RTW screening        []  COVID positive result - call Employee Health on day 5 after symptom onset.  The employee needs to be cleared by Employee Health to RTW.  [] RTW immediately, continue to monitor for sx  [x] RTW when sx improve; must be fever free for 24 hours w/o medications, Diarrhea/Vomiting free for 24 hours w/o medications  [] Alinity ordered; continue to monitor sx and call for new/worsening sx.  Discuss RTW guidelines with manager  [] May continue to work  [] Follow up with PCP  [] Home until further instruction from hotline with Alinity results  INSTRUCTIONS PROVIDED:  [x]  Plan as noted above  []  Length of time to obtain results   []   Quarantine instructions  []  Masking protocol   []  S/S of worsening infection/condition and importance of prompt medical re-evaluation including when to seek emergency care  [] If symptoms develop, stay home and call hotline for rapid test order    Estimated RTW date:      [] The employee voiced understanding of above plan/instructions  [x] Manager Notified

## 2024-03-29 ENCOUNTER — TELEPHONE (OUTPATIENT)
Dept: UROLOGY | Facility: CLINIC | Age: 59
End: 2024-03-29

## 2024-03-29 NOTE — TELEPHONE ENCOUNTER
Outgoing call to obtain health history prior to Monday 4/1/24 apt  LVM to call office back before 4pm if able

## 2024-04-01 ENCOUNTER — OFFICE VISIT (OUTPATIENT)
Dept: UROLOGY | Facility: CLINIC | Age: 59
End: 2024-04-01
Attending: OBSTETRICS & GYNECOLOGY
Payer: COMMERCIAL

## 2024-04-01 VITALS — WEIGHT: 151 LBS | BODY MASS INDEX: 25.78 KG/M2 | HEIGHT: 64 IN | RESPIRATION RATE: 16 BRPM | TEMPERATURE: 98 F

## 2024-04-01 DIAGNOSIS — N81.11 CYSTOCELE, MIDLINE: Primary | ICD-10-CM

## 2024-04-01 DIAGNOSIS — R35.0 FREQUENCY OF MICTURITION: ICD-10-CM

## 2024-04-01 DIAGNOSIS — K59.00 CONSTIPATED: ICD-10-CM

## 2024-04-01 DIAGNOSIS — R33.9 INCOMPLETE BLADDER EMPTYING: ICD-10-CM

## 2024-04-01 DIAGNOSIS — N81.6 RECTOCELE: ICD-10-CM

## 2024-04-01 DIAGNOSIS — R39.15 URGENCY OF URINATION: ICD-10-CM

## 2024-04-01 PROCEDURE — 99212 OFFICE O/P EST SF 10 MIN: CPT

## 2024-04-02 ENCOUNTER — DIABETIC EDUCATION (OUTPATIENT)
Dept: ENDOCRINOLOGY CLINIC | Facility: CLINIC | Age: 59
End: 2024-04-02
Payer: COMMERCIAL

## 2024-04-02 VITALS — BODY MASS INDEX: 26 KG/M2 | WEIGHT: 149 LBS

## 2024-04-02 DIAGNOSIS — Z79.4 TYPE 2 DIABETES MELLITUS WITH HYPERGLYCEMIA, WITH LONG-TERM CURRENT USE OF INSULIN (HCC): Primary | ICD-10-CM

## 2024-04-02 DIAGNOSIS — E11.65 TYPE 2 DIABETES MELLITUS WITH HYPERGLYCEMIA, WITH LONG-TERM CURRENT USE OF INSULIN (HCC): Primary | ICD-10-CM

## 2024-04-02 PROCEDURE — 97802 MEDICAL NUTRITION INDIV IN: CPT | Performed by: DIETITIAN, REGISTERED

## 2024-04-02 NOTE — PROGRESS NOTES
Vero DANIEL Michel 5/25/1965 was seen for individual Diabetic Medical Nutrition Therapy- an initial consult:    Date: 4/2/2024   Referral: KYLIE Torres  Start time 8:30 am  End time: 9:30 am    58 year old female presents for medical nutrition therapy for type 2 diabetes. Patient would like assistance with diet. Notes struggles with stress eating and does not always have a sense of fullness. Also notes some night time eating (around 10:30 pm or midnight) - can include greek yogurt and another source of carb (ex. fig bars).      Anthropometrics:  Wt Readings from Last 6 Encounters:   04/02/24 149 lb   04/01/24 151 lb   03/06/24 149 lb   02/28/24 150 lb   02/20/24 150 lb   01/29/24 144 lb         Current diabetes medications:  Insulin:  Tresiba to 15 units injection daily (takes at dinner)  Humalog sliding scale, three times/day with meals:   150-200 mg/dl = 2 units  201-250 mg/dl = 4 units  251-300 mg/dl = 6 units  301-350 mg/dl = 8 units  > 350 mg/dl = 10 units    Injection sites: abdomen, Tresiba sometimes in thigh - rotates injections   Notes areas of bruising, avoids as able.      Labs:  Lab Results   Component Value Date    A1C 8.5 (A) 01/29/2024    A1C 7.9 (A) 08/31/2023    CHOLEST 146 09/25/2023    CHOLEST 84 10/13/2022    LDL 89 09/25/2023    LDL 30 10/13/2022    HDL 36 (L) 09/25/2023    HDL 33 (L) 10/13/2022    NONHDLC 110 09/25/2023    NONHDLC 51 10/13/2022    TRIG 114 09/25/2023    TRIG 117 10/13/2022    BUN 20 (H) 09/25/2023    BUN 21 (H) 12/08/2022    CREATSERUM 0.80 09/25/2023    CREATSERUM 0.99 12/08/2022    GFRNAA 44 (L) 07/29/2022    GFRNAA 46 (L) 06/21/2022    GFRAA 51 (L) 07/29/2022    GFRAA 53 (L) 06/21/2022       Lab Results   Component Value Date    A1C 8.5 (A) 01/29/2024    A1C 7.9 (A) 08/31/2023    A1C 6.2 (A) 02/06/2023         Glucose testing at home:      Reviewed CGMS download and patient logs:    CGM download:  Period of 03/20/24-04/02/24  Sensor type: Freestyle Ale 3  Time CGM  Active: 96%  Glucose Variability: 18.0%  Glucose Management indicator (GMI): 7.0%    Average glucose (mg/dL): 155     Glucose Time in Range:    0%  Very high glucose targets: (> 250mg/dl)     19% High glucose targets: (> 180mg/dl)    81% In target range:  (70-180mg/dl)      0% Low glucose targets:  (less than 70mg/dl)      0% Very Low glucose targets: (< 54mg/dl )       Pattern of hyperglycemia, mainly after dinner  CGM report reviewed/printed and submitted to be scanned into chart.       Diet History:  Usually eats 3 meals/day and a snack. May have snack at 10:00 am at work. Notes her sugars spike with stress. She has also been aware of how certain foods impact her blood sugars.  (a.m.) Yogurt and a fig bar  (mid-day) Yesterday ate leftovers from dinner - chicken enchiladas  (p.m.) Yesterday had take out - ahi tuna salad  (snacks) Fig bar, popcorn, sometimes has piece of candy. When stressed may include piece of chocolate or hard candy (keeps to 1 piece/serving, checks to make sure under 15 grams of carbs) tries to include more popcorn or yogurt instead      Nutrition Diagnosis  PES: Altered nutrition related laboratory values related to knowledge deficit, living with diabetes as evidenced by elevated HBA1c, hyperglycemia      Intervention  -Comprehensive Nutrition Education Provided:  Reviewed carbohydrate counting and label reading.  Recommended carbohydrate targets of 30-45 grams at meals and 15 grams at snacks.  Provided suggestions for carbohydrate controlled snacks.  Reviewed the MyPlate method with focus on balanced macronutrient consumption; identifying foods that are carbohydrates, lean protein, non-starchy vegetables, and heart healthy fats.      Monitoring/Evaluation  Diet modification/understanding  Blood sugars  Hemoglobin A1c      Recommendations  Practice healthful eating patterns, emphasizing a variety of nutrient dense foods in appropriate portion sizes.    Monitor carbohydrate intake with the  MyPlate method, aim to include source of protein and/or healthy fats with carbs to assist with sense of fullness and glucose management.  Practice carbohydrate counting and label reading.  Improve glycemic control working towards an A1c of 7.0% or less.  Continue testing blood glucose with meter/CGM working towards a time in range of 70% or higher.      Patient Specific Goals:  Aim to follow a meal plan with a consistent amount of carbohydrates for meals and snacks:  Goal for meals is 30-45 grams of carbohydrates for each meal (3 meals per day)  Goal for snacks is 15 grams of carbohydrates for each snack (1-2 per day)    Aim to include carbohydrates plus protein with meals and snacks    You can also use the Plate Method as a model for your meals:  1/2 of your plate is non-starchy vegetables, 1/4 of your plate is lean protein, 1/4 of your plate is starchy or carbohydrate foods      For helping to increase vegetable intake with meals:  - add steamer vegetables   - mixed bag of lettuce      Use this website to help look up nutrition information:    www.[employer]        Blood Glucose Goals  Blood sugar goals: less than 130 mg/dl in the morning (fasting) and less than 180 mg/dl 2 hours after meals.  Keep glucose tabs or fast acting carbohydrates with you for treatment of lows; for blood glucose levels less than 70 mg/dl, take 15 grams of fast acting carbohydrates (3-4 glucose tabs, 4 ounces of juice, or as discussed). Retest in 15 min. If not above 70 mg/dl, retreat.      Follow up with dietitian as needed.      India Roberts, VELN, LDN, CDCES

## 2024-04-02 NOTE — PATIENT INSTRUCTIONS
Goals to work towards:    Aim to follow a meal plan with a consistent amount of carbohydrates for meals and snacks:  Goal for meals is 30-45 grams of carbohydrates for each meal (3 meals per day)  Goal for snacks is 15 grams of carbohydrates for each snack (1-2 per day)    Aim to include carbohydrates plus protein with meals and snacks    You can also use the Plate Method as a model for your meals:  1/2 of your plate is non-starchy vegetables, 1/4 of your plate is lean protein, 1/4 of your plate is starchy or carbohydrate foods      For helping to increase vegetable intake with meals:  - add steamer vegetables   - mixed bag of lettuce      Use this website to help look up nutrition information:    www.[employer].com    Follow up with the dietitian as needed.

## 2024-04-05 ENCOUNTER — OFFICE VISIT (OUTPATIENT)
Facility: CLINIC | Age: 59
End: 2024-04-05
Payer: COMMERCIAL

## 2024-04-05 VITALS
SYSTOLIC BLOOD PRESSURE: 118 MMHG | WEIGHT: 150.19 LBS | HEART RATE: 70 BPM | BODY MASS INDEX: 25.64 KG/M2 | HEIGHT: 64 IN | DIASTOLIC BLOOD PRESSURE: 68 MMHG

## 2024-04-05 DIAGNOSIS — N76.0 VAGINITIS AND VULVOVAGINITIS: Primary | ICD-10-CM

## 2024-04-05 PROCEDURE — 99212 OFFICE O/P EST SF 10 MIN: CPT | Performed by: STUDENT IN AN ORGANIZED HEALTH CARE EDUCATION/TRAINING PROGRAM

## 2024-04-05 NOTE — PROGRESS NOTES
[facility]  Obstetrics and Gynecology   Established Patient Visit    Chief complaint:   Chief Complaint   Patient presents with    Gyn Problem     Labia exam, Patient reports it is improving.           HPI: Vero Michel is a 58 year old  with Patient's last menstrual period was 10/21/2012 (lmp unknown).    Pt seen 24 with severe vulvar irritation - on exam had appearance of inflammatory vulvar condition (ie lichen sclerosus) vs possible severe fungal infection or contact dermatitis  Biopsy done - NO lichen sclerosus, but there was superficial fungal elements morphologically c/w Candida species  Vaginitis swab resulted with both candida albicans and candida glabrata  Pt was advised to use clobetasol for inflammation, rxed fluconazole and boric acid suppositories for glabrata  Boric acid suppositories too irritating and still symptomatic last visit 3/6 - we then were able to rx nystatin suppositories to compounding pharmacy    Pt now feeling much better. Feels like skin is still a little sore or sensitive but otherwise back to normal    Pt also saw Dr Mohamud dave 24 - they scheduled pessary fitting, rec pelvic floor PT, pt also advised to restart vag estrogen    Hx T2DM, of note recent A1c 8.5 - pt is working with endocrinologist, now taking insulin      Meds:  Current Outpatient Medications on File Prior to Visit   Medication Sig Dispense Refill    Atogepant (QULIPTA) 30 MG Oral Tab TAKE 1 TABLET BY MOUTH DAILY 30 tablet 2    Omeprazole 40 MG Oral Capsule Delayed Release Take 1 capsule (40 mg total) by mouth daily. 90 capsule 0    ALPRAZolam 1 MG Oral Tab Take 1 tablet (1 mg total) by mouth 2 (two) times daily as needed for Sleep or Anxiety. 60 tablet 3    Insulin Degludec (TRESIBA FLEXTOUCH) 100 UNIT/ML Subcutaneous Solution Pen-injector Inject daily as directed up TDD = 20 units 15 mL 0    Insulin Lispro, 1 Unit Dial, (HUMALOG KWIKPEN) 100 UNIT/ML Subcutaneous Solution Pen-injector Inject  3x/day with meals as directed up to TDD = 20 units 15 mL 0    Insulin Pen Needle (BD PEN NEEDLE ARCHANA U/F) 32G X 4 MM Does not apply Misc Inject 1 Pen into the skin 4 (four) times daily. Use to inject insulin 4x/day as directed 200 each 3    FREESTYLE TANIKA 3 SENSOR Does not apply Misc 1 each every 14 (fourteen) days. 2 each 11    ZENPEP 23346-892000 units Oral Cap DR Particles TAKE 1 CAPSULE BY MOUTH 3 TIMES DAILY BEFORE MEALS. PLEASE ALSO TAKE 1 CAPSULE WITH LARGE SNACKS.      Eletriptan Hydrobromide 40 MG Oral Tab use at onset; may repeat once after 4 hours- ONLY 2 IN 24 HOUR PERIOD MAX.  This is a 30 day supply. 8 tablet 11    nortriptyline 25 MG Oral Cap Take 1 capsule (25 mg total) by mouth nightly. 90 capsule 1    nortriptyline 10 MG Oral Cap Take 1 capsule (10 mg total) by mouth nightly. Take with 25mg at bedtime po for total of 35mg at bedtime po. 90 capsule 1    FARXIGA 10 MG Oral Tab TAKE 1 TABLET BY MOUTH EVERY DAY 90 tablet 1    rosuvastatin 20 MG Oral Tab Take 1 tablet (20 mg total) by mouth nightly. 90 tablet 1    lamoTRIgine 200 MG Oral Tab Take 1 tablet (200 mg total) by mouth 2 (two) times daily. 180 tablet 1    ondansetron (ZOFRAN) 4 mg tablet TAKE 1 TABLET BY MOUTH TO BE TAKEN WITH THE FIRST DOSE OF MIGRAINE MEDICATION 30 tablet 2    ketoconazole 2 % External Cream       Microlet Lancets Does not apply Misc Test blood glucose twice daily 200 each 11    Fluticasone Propionate 50 MCG/ACT Nasal Suspension USE 1 SPRAY IN EACH NOSTRIL TWICE A DAY      FLUCYTOSINE Does not apply Powder 16% flucytosine cream, apply 5g nightly for 14 days (Patient not taking: Reported on 4/1/2024) 1 each 0    estradiol 0.1 MG/GM Vaginal Cream 1 gram per vagina nightly for 2 weeks, then 1 gram per vagina three times per week for at least 10 weeks. May taper to 0.5 grams (pea-sized amount) nightly thereafter. (Patient not taking: Reported on 4/1/2024) 1 each 3    dicyclomine 10 MG Oral Cap Take 1 capsule (10 mg total) by  mouth 3 (three) times daily as needed (abd cramping). (Patient not taking: Reported on 2024) 90 capsule 1    folic acid 400 MCG Oral Tab Take 1 tablet (400 mcg total) by mouth daily. (Patient not taking: Reported on 2024)       No current facility-administered medications on file prior to visit.         PHYSICAL EXAM:     Vitals:    24 1412   BP: 118/68   Pulse: 70   Weight: 150 lb 3.2 oz (68.1 kg)   Height: 64\"       Body mass index is 25.78 kg/m².    Exam:  General: NAD, appears well  Pelvic exam: previously noted diffuse vulvar erythema is now resolved      Assessment & Plan:     Vero Michel is a 58 year old  female here for follow up     Diagnoses and all orders for this visit:    Vaginitis and vulvovaginitis         Sx much improved with treatment of candida (& candida glabrata)  No longer needing clobetasol (stopped once antifungals started working) - d/w pt would really only have her continue vaginal estrogen, clobetasol should not be needed anymore      Nataly Hoyt MD  [facility] - OBGYN

## 2024-04-21 DIAGNOSIS — Z79.4 TYPE 2 DIABETES MELLITUS WITH HYPERGLYCEMIA, WITH LONG-TERM CURRENT USE OF INSULIN (HCC): ICD-10-CM

## 2024-04-21 DIAGNOSIS — E11.65 TYPE 2 DIABETES MELLITUS WITH HYPERGLYCEMIA, WITH LONG-TERM CURRENT USE OF INSULIN (HCC): ICD-10-CM

## 2024-04-21 DIAGNOSIS — E78.2 MIXED HYPERLIPIDEMIA: ICD-10-CM

## 2024-04-22 RX ORDER — INSULIN LISPRO 100 [IU]/ML
INJECTION, SOLUTION INTRAVENOUS; SUBCUTANEOUS
Qty: 15 ML | Refills: 1 | Status: SHIPPED | OUTPATIENT
Start: 2024-04-22

## 2024-04-22 RX ORDER — INSULIN DEGLUDEC 100 U/ML
INJECTION, SOLUTION SUBCUTANEOUS
Qty: 15 ML | Refills: 1 | Status: SHIPPED | OUTPATIENT
Start: 2024-04-22

## 2024-04-22 NOTE — TELEPHONE ENCOUNTER
Requested Prescriptions     Pending Prescriptions Disp Refills    insulin degludec (TRESIBA FLEXTOUCH) 100 UNIT/ML Subcutaneous Solution Pen-injector [Pharmacy Med Name: TRESIBA FLEXTOUCH 100 UNIT/ML]  0     Sig: INJECT DAILY AS DIRECTED UP TOTAL DAILY DOSE= 20 UNITS    Insulin Lispro, 1 Unit Dial, 100 UNIT/ML Subcutaneous Solution Pen-injector [Pharmacy Med Name: INSULIN LISPRO 100 UNIT/ML PEN]  0     Sig: INJECT 3X/DAY WITH MEALS AS DIRECTED UP TO TOTAL DAILY DOSE = 20 UNITS     Future Appointments   Date Time Provider Department Center   4/29/2024  9:00 AM Lenore Henriquez APRN [facility]   5/29/2024  8:20 AM Foster Landis MD [facility] [place]   6/10/2024  1:15 PM Eduar Moreno,  [facility] [place]   7/15/2024  9:40 AM Benoit Mallory MD [facility] None     Last A1c value was 8.5% done 1/29/2024.  Refill 1/29/24  LOV 3/22/24

## 2024-04-22 NOTE — TELEPHONE ENCOUNTER
Last office visit 10-9-23    Last Refill:10-23-23      Return to clinic :6 month , message sent to make appointment       Please sign if appropriate

## 2024-04-29 ENCOUNTER — TELEMEDICINE (OUTPATIENT)
Dept: ENDOCRINOLOGY CLINIC | Facility: CLINIC | Age: 59
End: 2024-04-29
Payer: COMMERCIAL

## 2024-04-29 DIAGNOSIS — Z79.4 TYPE 2 DIABETES MELLITUS WITH HYPERGLYCEMIA, WITH LONG-TERM CURRENT USE OF INSULIN (HCC): Primary | ICD-10-CM

## 2024-04-29 DIAGNOSIS — E11.65 TYPE 2 DIABETES MELLITUS WITH HYPERGLYCEMIA, WITH LONG-TERM CURRENT USE OF INSULIN (HCC): Primary | ICD-10-CM

## 2024-04-29 RX ORDER — ROSUVASTATIN CALCIUM 20 MG/1
20 TABLET, COATED ORAL NIGHTLY
Qty: 90 TABLET | Refills: 0 | Status: SHIPPED | OUTPATIENT
Start: 2024-04-29

## 2024-04-29 NOTE — PROGRESS NOTES
HPI:   Vero Michel is a 58 year old female who presents virtually for the management of her diabetes.   This visit is conducted using Telemedicine with live, two way, interactive video and audio.  Patient verbally consents to Telemedicine visit.  Patient understands and accepts financial responsibility for any deductible, co-insurance and/or co-pays associated with this service.    Chief Complaint: Diabetes Follow Up    Diabetes: needs improvement  Type: 2   Duration:> 15 years  Current Meds: Tresiba 15 units injection daily, Humalog sliding scale: 150-200 mg/dl = 2 units, 201-250 mg/dl = 4 units, 251-300 mg/dl = 6 units, 301-350 mg/dl = 8 units, > 350 mg/dl = 10 units tid w/meals   Long term insulin use: yes  Failed Meds/Previous Tx: glimepiride, Januvia, Jardiance - mycotic infection, pioglitazone, Metformin, Trulicity    Complications: Proteinuria, CKD stage 3, TAMEKA    Personal Freestyle Ale CGM  Analysis of data: 4/16/2024-4/29/2024  % Time CGM is Active: 95%  Sensor download: full report  in media  Average glucose : 155 mg/dl   GMI: 7.0%    CV (coefficient of variation) : 17.6%     17% time above 180mg /dl   0% time above 250 mg/dl  83% time in target range:  mg/dl   0% time below target range: 70mg/dl     Evaluation   1. Baseline glucose stable and in target range but somewhat elevated  2. Postprandial glucose remaining within target range and returning to baseline  3. Low likelihood of hypoglycemia  4. Normal glucose  variability         Overall glucose control:   HGBA1C:    Lab Results   Component Value Date    A1C 8.5 (A) 01/29/2024    A1C 7.9 (A) 08/31/2023    A1C 6.2 (A) 02/06/2023     (H) 10/13/2022          Wt Readings from Last 3 Encounters:   04/05/24 150 lb 3.2 oz (68.1 kg)   04/02/24 149 lb (67.6 kg)   04/01/24 151 lb (68.5 kg)           Past History:   She  has a past medical history of Anxiety, Arthritis (01/01/2000), Back pain (01/01/2010), Constipation (04/01/2022), Depression,  Diabetes (HCC), Diabetes mellitus (HCC) (01/01/2005), Esophageal reflux, Fatigue (01/01/2001), Flatulence/gas pain/belching (01/01/2000), Frequent use of laxatives (04/01/2022), H/O degenerative disc disease, Headache disorder (01/01/2000), High cholesterol (01/01/2010), History of depression (01/01/1990), History of mental disorder (01/01/1990), motion sickness, Hyperlipidemia, Hypertension, Impaired vision, Lichen sclerosus, Loss of appetite (02/01/2022), Lumbar disc herniation, Migraines, Nausea (02/01/2022), Nausea and/or vomiting (05/02/2023), Osteoarthritis, Pain in joints (01/01/2010), Pain with bowel movements (04/01/2022), Problems with swallowing, S/P hip replacement, left, Sleep apnea (01/01/2000), Sleep disturbance (01/01/2005), Stress, Type 2 diabetes mellitus (HCC), Uncomfortable fullness after meals (02/01/2022), Visual impairment, Vomiting (02/01/2022), Wears glasses (01/01/1990), and Weight loss (05/01/2022).   Her family history includes Breast Cancer (age of onset: 50) in her paternal grandmother; Cancer (age of onset: 73) in her father; Diabetes in her maternal grandfather; Hypertension in her mother, sister, and another family member; Lipids in an other family member; Migraines in her daughter; Prostate Cancer in her father; Psychiatric in her mother.   She  reports that she has never smoked. She has never been exposed to tobacco smoke. She has never used smokeless tobacco. She reports current alcohol use. She reports that she does not use drugs.     She is allergic to topiramate.     Current Outpatient Medications on File Prior to Visit   Medication Sig    insulin degludec (TRESIBA FLEXTOUCH) 100 UNIT/ML Subcutaneous Solution Pen-injector INJECT DAILY AS DIRECTED UP TOTAL DAILY DOSE= 20 UNITS    Insulin Lispro, 1 Unit Dial, 100 UNIT/ML Subcutaneous Solution Pen-injector INJECT 3X/DAY WITH MEALS AS DIRECTED UP TO TOTAL DAILY DOSE = 20 UNITS    rosuvastatin 20 MG Oral Tab Take 1 tablet (20 mg  total) by mouth nightly.    Omeprazole 40 MG Oral Capsule Delayed Release Take 1 capsule (40 mg total) by mouth daily.    Atogepant (QULIPTA) 30 MG Oral Tab TAKE 1 TABLET BY MOUTH DAILY    FLUCYTOSINE Does not apply Powder 16% flucytosine cream, apply 5g nightly for 14 days (Patient not taking: Reported on 4/1/2024)    ALPRAZolam 1 MG Oral Tab Take 1 tablet (1 mg total) by mouth 2 (two) times daily as needed for Sleep or Anxiety.    Insulin Pen Needle (BD PEN NEEDLE ARCHANA U/F) 32G X 4 MM Does not apply Misc Inject 1 Pen into the skin 4 (four) times daily. Use to inject insulin 4x/day as directed    FREESTYLE TANIKA 3 SENSOR Does not apply Misc 1 each every 14 (fourteen) days.    estradiol 0.1 MG/GM Vaginal Cream 1 gram per vagina nightly for 2 weeks, then 1 gram per vagina three times per week for at least 10 weeks. May taper to 0.5 grams (pea-sized amount) nightly thereafter. (Patient not taking: Reported on 4/1/2024)    ZENPEP 00198-614055 units Oral Cap DR Particles TAKE 1 CAPSULE BY MOUTH 3 TIMES DAILY BEFORE MEALS. PLEASE ALSO TAKE 1 CAPSULE WITH LARGE SNACKS.    Eletriptan Hydrobromide 40 MG Oral Tab use at onset; may repeat once after 4 hours- ONLY 2 IN 24 HOUR PERIOD MAX.  This is a 30 day supply.    nortriptyline 25 MG Oral Cap Take 1 capsule (25 mg total) by mouth nightly.    nortriptyline 10 MG Oral Cap Take 1 capsule (10 mg total) by mouth nightly. Take with 25mg at bedtime po for total of 35mg at bedtime po.    FARXIGA 10 MG Oral Tab TAKE 1 TABLET BY MOUTH EVERY DAY    lamoTRIgine 200 MG Oral Tab Take 1 tablet (200 mg total) by mouth 2 (two) times daily.    ondansetron (ZOFRAN) 4 mg tablet TAKE 1 TABLET BY MOUTH TO BE TAKEN WITH THE FIRST DOSE OF MIGRAINE MEDICATION    dicyclomine 10 MG Oral Cap Take 1 capsule (10 mg total) by mouth 3 (three) times daily as needed (abd cramping). (Patient not taking: Reported on 4/1/2024)    ketoconazole 2 % External Cream     Microlet Lancets Does not apply Misc Test  blood glucose twice daily    folic acid 400 MCG Oral Tab Take 1 tablet (400 mcg total) by mouth daily. (Patient not taking: Reported on 4/1/2024)    Fluticasone Propionate 50 MCG/ACT Nasal Suspension USE 1 SPRAY IN EACH NOSTRIL TWICE A DAY     No current facility-administered medications on file prior to visit.       CMP  (most recent labs)   Lab Results   Component Value Date/Time     (H) 09/25/2023 09:23 AM    BUN 20 (H) 09/25/2023 09:23 AM    CREATSERUM 0.80 09/25/2023 09:23 AM    GFRNAA 44 (L) 07/29/2022 11:53 AM    GFRAA 51 (L) 07/29/2022 11:53 AM    EGFRCR 85 09/25/2023 09:23 AM    CA 9.9 09/25/2023 09:23 AM    ALKPHO 87 09/25/2023 09:23 AM    AST 28 09/25/2023 09:23 AM    ALT 30 09/25/2023 09:23 AM    BILT 0.3 09/25/2023 09:23 AM    TP 7.3 09/25/2023 09:23 AM    ALB 4.0 09/25/2023 09:23 AM     09/25/2023 09:23 AM    K 4.5 09/25/2023 09:23 AM     09/25/2023 09:23 AM    CO2 30.0 09/25/2023 09:23 AM           Lipids  (most recent labs)   Lab Results   Component Value Date/Time    CHOLEST 146 09/25/2023 09:23 AM    TRIG 114 09/25/2023 09:23 AM    HDL 36 (L) 09/25/2023 09:23 AM    LDL 89 09/25/2023 09:23 AM    NONHDLC 110 09/25/2023 09:23 AM          Diabetes  (most recent labs)   Lab Results   Component Value Date/Time    A1C 8.5 (A) 01/29/2024 11:09 AM          Microalb (most recent labs)   Lab Results   Component Value Date/Time    MICROALBCREA 20.9 09/25/2023 09:23 AM        Lab results reviewed with patient.    REVIEW OF SYSTEMS:   GENERAL HEALTH: feels stressed  SKIN: denies any unusual skin lesions or rashes  RESPIRATORY: denies shortness of breath with exertion  CARDIOVASCULAR: denies chest pain on exertion  GI: denies abdominal pain and denies heartburn  NEURO: denies headaches     EXAM:   Physical Exam  Constitutional:       Appearance: Normal appearance.   Pulmonary:      Comments: Able to speak in complete sentences without difficulty  Neurological:      Mental Status: She is alert  and oriented to person, place, and time.   Psychiatric:         Mood and Affect: Mood normal.         Behavior: Behavior normal.         ASSESSMENT AND PLAN:   Diabetes:  Patient to continue Humalog sliding scale: 150-200 mg/dl = 2 units, 201-250 mg/dl = 4 units, 251-300 mg/dl = 6 units, 301-350 mg/dl = 8 units, > 350 mg/dl = 10 units tid w/meals and increase Tresiba to 16 units injection daily.  Patient to continue to use personal Freestyle Ale CGM for glucose monitoring.  Discussed self monitoring blood glucose and the importance of monitoring.  Patient agreed to monitoring qid - before meals and 2 hours postprandial of one meal per day if not using CGM.  Hypoglycemia S&S, Rx, and when to call APRN/CDE reviewed using Rule of 15's. Stressed need to call if 2 readings below 80 mg/dl in 1 week for medication adjustment.   Reinforced healthy eating in healthy portions and increasing daily physical activity.    Patient to have A1c and urine microalbumin done at lab this week.    DM Health Maintenance  Last dilated eye exam: Last Dilated Eye Exam: 23   Exam shows retinopathy? Eye Exam shows Diabetic Retinopathy?: No    Last diabetic foot exam: Last Foot Exam: 24    Date of last PHQ-2 depression screen: PHQ-2 - Date of last depression screenin2024    Patient  reports that she has never smoked. She has never been exposed to tobacco smoke. She has never used smokeless tobacco.  When is flu vaccine due? No recommendations at this time  When is pneumonia vaccine due? No recommendations at this time    The patient indicates understanding of these issues and agrees to the plan.  Refills addressed at time of office visit.    Diagnoses and all orders for this visit:    Type 2 diabetes mellitus with hyperglycemia, with long-term current use of insulin (HCC)           Return in about 3 months (around 2024) for 45 minute appointment, Personal CGM.    The risks and benefits of my recommendations, as well as  other treatment options were discussed with the patient today. questions were answered to the best of my knowledge.  Patient verbalizes understanding and amendable to plan of care.  Duration of this service:  8 minutes   Lenore ESPINAL, BC-ADM, [facility]

## 2024-05-09 RX ORDER — NORTRIPTYLINE HYDROCHLORIDE 25 MG/1
25 CAPSULE ORAL NIGHTLY
Qty: 90 CAPSULE | Refills: 3 | Status: SHIPPED | OUTPATIENT
Start: 2024-05-09

## 2024-05-09 NOTE — TELEPHONE ENCOUNTER
Medication: Nortriptyline 25 mg     Date of last refill: 11/24/2023 wth 1 addt refills  [place]      Last office visit: 02/28/2024  Due back to clinic per last office note:    Date next office visit scheduled:    Future Appointments   Date Time Provider Department Center   5/15/2024 10:15 AM Vincent Melchor, DO [facility] [id number]   5/28/2024  3:15 PM Eduar Moreno,  [facility] [place]   5/29/2024  8:20 AM Foster Landis MD [facility] [place]   7/15/2024  9:40 AM Benoit Mallory MD [facility] None   8/6/2024 10:00 AM Lenore Henriquez APRN [id number]           Last OV note recommendation:    Botox

## 2024-05-15 ENCOUNTER — OFFICE VISIT (OUTPATIENT)
Dept: FAMILY MEDICINE CLINIC | Facility: CLINIC | Age: 59
End: 2024-05-15

## 2024-05-15 VITALS — RESPIRATION RATE: 14 BRPM | WEIGHT: 153 LBS | BODY MASS INDEX: 26.12 KG/M2 | HEIGHT: 64.02 IN

## 2024-05-15 DIAGNOSIS — E78.2 MIXED HYPERLIPIDEMIA: Primary | ICD-10-CM

## 2024-05-15 PROCEDURE — 99213 OFFICE O/P EST LOW 20 MIN: CPT | Performed by: FAMILY MEDICINE

## 2024-05-15 NOTE — PROGRESS NOTES
[facility] Family Medicine Office Note  Chief Complaint:   Chief Complaint   Patient presents with    Medication Follow-Up       HPI:   This is a 58 year old female coming in for hyperlipidemia.  Patient is currently taking rosuvastatin.  Denies any side effects from the medication including myalgias.  She is not having any chest pain or shortness of breath.  She does have a history of diabetes that is managed by diabetic services.      Past Medical History:    Anxiety    Arthritis    Back pain    Constipation    Depression    Diabetes (HCC)    Diabetes mellitus (HCC)    Esophageal reflux    Fatigue    Flatulence/gas pain/belching    Frequent use of laxatives    Med complication    H/O degenerative disc disease    Headache disorder    Migraine    High cholesterol    History of depression    History of mental disorder    Hx of motion sickness    Hyperlipidemia    Hypertension    Impaired vision    Lichen sclerosus    Loss of appetite    Lumbar disc herniation    Migraines    Nausea    Periodic with migraines    Nausea and/or vomiting    Osteoarthritis    Pain in joints    Pain with bowel movements    Problems with swallowing    S/P hip replacement, left    Sleep apnea    Sleep disturbance    CPAP    Stress    Type 2 diabetes mellitus (HCC)    Uncomfortable fullness after meals    Migraines no appitite    Visual impairment    glasses for distance    Vomiting    Multiple times daily feb/march lessened and now none    Wears glasses    Weight loss     Past Surgical History:   Procedure Laterality Date    Biopsy  02/2024    Labia    Colonoscopy  01/01/2007    Evaluate only, bladder (dmg)  2007    Hip replacement surgery  2019    Hip surgery Left 10/21/2019    THR    Other surgical history      SLING (Bladder)    Other surgical history  2010    bladder sling at [facility],    Tonsillectomy       Social History:  Social History     Socioeconomic History    Marital status: Single    Number of children: 4   Tobacco Use     Smoking status: Never     Passive exposure: Never    Smokeless tobacco: Never   Vaping Use    Vaping status: Never Used   Substance and Sexual Activity    Alcohol use: Yes     Comment: 1 every couple months    Drug use: No   Other Topics Concern    Caffeine Concern No    Stress Concern Yes    Weight Concern No    Special Diet Yes    Exercise No    Seat Belt Yes     Social Determinants of Health     Financial Resource Strain: Low Risk  (4/6/2022)    [facility]    Overall Financial Resource Strain (CARDIA)     Difficulty of Paying Living Expenses: Not very hard   Transportation Needs: No Transportation Needs (4/6/2022)    [facility]    PRAPARE - Transportation     Lack of Transportation (Medical): No     Lack of Transportation (Non-Medical): No     Family History:  Family History   Problem Relation Age of Onset    Prostate Cancer Father     Cancer Father 73        prostate    Hypertension Mother     Psychiatric Mother     Migraines Daughter     Diabetes Maternal Grandfather     Breast Cancer Paternal Grandmother 50    Hypertension Sister     Lipids Other     Hypertension Other      Allergies:  Allergies   Allergen Reactions    Topiramate CONFUSION     Current Meds:  Current Outpatient Medications   Medication Sig Dispense Refill    NORTRIPTYLINE 25 MG Oral Cap TAKE 1 CAPSULE BY MOUTH EVERY DAY NIGHTLY 90 capsule 3    rosuvastatin 20 MG Oral Tab Take 1 tablet (20 mg total) by mouth nightly. 90 tablet 0    Omeprazole 40 MG Oral Capsule Delayed Release Take 1 capsule (40 mg total) by mouth daily. 90 capsule 0    insulin degludec (TRESIBA FLEXTOUCH) 100 UNIT/ML Subcutaneous Solution Pen-injector INJECT DAILY AS DIRECTED UP TOTAL DAILY DOSE= 20 UNITS 15 mL 1    Insulin Lispro, 1 Unit Dial, 100 UNIT/ML Subcutaneous Solution Pen-injector INJECT 3X/DAY WITH MEALS AS DIRECTED UP TO TOTAL DAILY DOSE = 20 UNITS 15 mL 1    Atogepant  (QULIPTA) 30 MG Oral Tab TAKE 1 TABLET BY MOUTH DAILY 30 tablet 2    FLUCYTOSINE Does not apply Powder 16% flucytosine cream, apply 5g nightly for 14 days 1 each 0    ALPRAZolam 1 MG Oral Tab Take 1 tablet (1 mg total) by mouth 2 (two) times daily as needed for Sleep or Anxiety. 60 tablet 3    Insulin Pen Needle (BD PEN NEEDLE ARCHANA U/F) 32G X 4 MM Does not apply Misc Inject 1 Pen into the skin 4 (four) times daily. Use to inject insulin 4x/day as directed 200 each 3    FREESTYLE TANIKA 3 SENSOR Does not apply Misc 1 each every 14 (fourteen) days. 2 each 11    estradiol 0.1 MG/GM Vaginal Cream 1 gram per vagina nightly for 2 weeks, then 1 gram per vagina three times per week for at least 10 weeks. May taper to 0.5 grams (pea-sized amount) nightly thereafter. 1 each 3    ZENPEP 90091-007016 units Oral Cap DR Particles TAKE 1 CAPSULE BY MOUTH 3 TIMES DAILY BEFORE MEALS. PLEASE ALSO TAKE 1 CAPSULE WITH LARGE SNACKS.      Eletriptan Hydrobromide 40 MG Oral Tab use at onset; may repeat once after 4 hours- ONLY 2 IN 24 HOUR PERIOD MAX.  This is a 30 day supply. 8 tablet 11    nortriptyline 10 MG Oral Cap Take 1 capsule (10 mg total) by mouth nightly. Take with 25mg at bedtime po for total of 35mg at bedtime po. 90 capsule 1    FARXIGA 10 MG Oral Tab TAKE 1 TABLET BY MOUTH EVERY DAY 90 tablet 1    lamoTRIgine 200 MG Oral Tab Take 1 tablet (200 mg total) by mouth 2 (two) times daily. 180 tablet 1    ondansetron (ZOFRAN) 4 mg tablet TAKE 1 TABLET BY MOUTH TO BE TAKEN WITH THE FIRST DOSE OF MIGRAINE MEDICATION 30 tablet 2    dicyclomine 10 MG Oral Cap Take 1 capsule (10 mg total) by mouth 3 (three) times daily as needed (abd cramping). 90 capsule 1    ketoconazole 2 % External Cream       Microlet Lancets Does not apply Misc Test blood glucose twice daily 200 each 11    Fluticasone Propionate 50 MCG/ACT Nasal Suspension USE 1 SPRAY IN EACH NOSTRIL TWICE A DAY      folic acid 400 MCG Oral Tab Take 1 tablet (400 mcg total) by  mouth daily. (Patient not taking: Reported on 4/1/2024)        Counseling given: Not Answered       REVIEW OF SYSTEMS:   ROS:  CONSTITUTIONAL:  Denies any unusual weight gain/loss, fever, chills, weakness or fatigue.  CARDIOVASCULAR:  Denies chest pain, chest pressure or chest discomfort. No palpitations or edema.  Denies any dyspnea on exertion or at rest  RESPIRATORY:  Denies shortness of breath, cough  GASTROINTESTINAL:  Denies any abdominal pain, nausea, vomiting  NEUROLOGICAL:  Denies headache, dizziness, syncope, numbness or tingling in the extremities.  MUSCULOSKELETAL:  Denies muscle, back pain, joint pain or stiffness.    EXAM:   BP (P) 100/62   Pulse (P) 70   Temp (P) 98 °F (36.7 °C)   Ht 5' 4.02\" (1.626 m)   Wt 153 lb (69.4 kg)   LMP 10/21/2012 (LMP Unknown)   BMI 26.25 kg/m²  Estimated body mass index is 26.25 kg/m² as calculated from the following:    Height as of this encounter: 5' 4.02\" (1.626 m).    Weight as of this encounter: 153 lb (69.4 kg).   Vital signs reviewed.Appears stated age, well groomed.  Physical Exam:  GEN:  Patient is alert and oriented x3, no apparent distress  HEAD:  Normocephalic, atraumatic  LUNGS: clear to auscultation bilaterally, no rales/rhonchi/wheezing  HEART:  Regular rate and rhythm, no murmurs, rubs or gallops  ABDOMEN:  Soft, nondistended, nontender, bowel sounds normal in all 4 quadrants, no hepatosplenomegaly  EXTREMITIES:  Strength intact with 5/5 bilaterally upper and lower extremities, no edema noted  NEURO:  CN 2 - 12 grossly intact     ASSESSMENT AND PLAN:   1. Mixed hyperlipidemia  -  recheck lipid panel and LFTs  -  continue rosuvastatin  -  LDL goal < 70  -  f/u in 6 months for annual physical exam  - Comp Metabolic Panel (14) [E]; Future  - Lipid Panel [E]; Future      Meds & Refills for this Visit:  Requested Prescriptions      No prescriptions requested or ordered in this encounter       Health Maintenance:  Health Maintenance Due   Topic Date Due     Zoster Vaccines (1 of 2) Never done    COVID-19 Vaccine (4 - 2023-24 season) 09/01/2023    Diabetes Care A1C  04/29/2024       Patient/Caregiver Education: Patient/Caregiver Education: There are no barriers to learning. Medical education done.   Outcome: Patient verbalizes understanding. Patient is notified to call with any questions, complications, allergies, or worsening or changing symptoms.  Patient is to call with any side effects or complications from the treatments as a result of today.     Problem List:  Patient Active Problem List   Diagnosis    Osteoarthrosis, unspecified whether generalized or localized, pelvic region and thigh    Enthesopathy of hip region    Chest pain    Hypertension    DM type 2 (diabetes mellitus, type 2) (HCC)    Obstructive sleep apnea    Mixed hyperlipidemia    Bicipital tenosynovitis, left    Primary osteoarthritis of left hip    Obesity    Status post total hip replacement, left    DDD (degenerative disc disease), lumbar    Lumbar disc herniation    Trochanteric bursitis, left hip    Major depressive disorder, recurrent episode, moderate (HCC)    Panic disorder without agoraphobia    Obsessive-compulsive disorder    Primary insomnia    Hypokalemia    Anemia    Acute renal failure (ARF) (HCC)    Acute kidney injury (HCC)    Hyperglycemia    Hypotension    Migraine, chronic, without aura, intractable, with status migrainosus    IVANA (acute kidney injury) (HCC)    ATN (acute tubular necrosis) ([facility])    Uncontrolled type 2 diabetes mellitus with hyperglycemia (HCC)    Constipation    Nausea    Nausea and/or vomiting    Loss of weight    Irregular Z line of esophagus    Change in bowel habits    Benign neoplasm of ascending colon    Colon polyp    Internal hemorrhoids    Dysphagia    Godwin's esophagus    Eosinophilic esophagitis       HEATHER RIVERA, DO    Please note that portions of this note may have been completed with a voice recognition program. Efforts were made to edit  the dictations but occasionally words are mis-transcribed.

## 2024-05-16 ENCOUNTER — LAB ENCOUNTER (OUTPATIENT)
Dept: LAB | Age: 59
End: 2024-05-16
Attending: FAMILY MEDICINE

## 2024-05-16 DIAGNOSIS — E11.65 TYPE 2 DIABETES MELLITUS WITH HYPERGLYCEMIA, WITH LONG-TERM CURRENT USE OF INSULIN (HCC): ICD-10-CM

## 2024-05-16 DIAGNOSIS — Z79.4 TYPE 2 DIABETES MELLITUS WITH HYPERGLYCEMIA, WITH LONG-TERM CURRENT USE OF INSULIN (HCC): ICD-10-CM

## 2024-05-16 DIAGNOSIS — R19.5 ELEVATED FECAL CALPROTECTIN: ICD-10-CM

## 2024-05-16 DIAGNOSIS — E78.2 MIXED HYPERLIPIDEMIA: ICD-10-CM

## 2024-05-16 LAB
ALBUMIN SERPL-MCNC: 3.7 G/DL (ref 3.4–5)
ALBUMIN/GLOB SERPL: 1.1 {RATIO} (ref 1–2)
ALP LIVER SERPL-CCNC: 84 U/L
ALT SERPL-CCNC: 36 U/L
ANION GAP SERPL CALC-SCNC: 3 MMOL/L (ref 0–18)
AST SERPL-CCNC: 32 U/L (ref 15–37)
BILIRUB SERPL-MCNC: 0.5 MG/DL (ref 0.1–2)
BUN BLD-MCNC: 24 MG/DL (ref 9–23)
CALCIUM BLD-MCNC: 9.7 MG/DL (ref 8.5–10.1)
CHLORIDE SERPL-SCNC: 109 MMOL/L (ref 98–112)
CHOLEST SERPL-MCNC: 138 MG/DL (ref ?–200)
CO2 SERPL-SCNC: 31 MMOL/L (ref 21–32)
CREAT BLD-MCNC: 1.01 MG/DL
CREAT UR-SCNC: 108 MG/DL
EGFRCR SERPLBLD CKD-EPI 2021: 64 ML/MIN/1.73M2 (ref 60–?)
EST. AVERAGE GLUCOSE BLD GHB EST-MCNC: 166 MG/DL (ref 68–126)
FASTING PATIENT LIPID ANSWER: YES
FASTING STATUS PATIENT QL REPORTED: YES
GLOBULIN PLAS-MCNC: 3.5 G/DL (ref 2.8–4.4)
GLUCOSE BLD-MCNC: 130 MG/DL (ref 70–99)
HBA1C MFR BLD: 7.4 % (ref ?–5.7)
HDLC SERPL-MCNC: 42 MG/DL (ref 40–59)
LDLC SERPL CALC-MCNC: 83 MG/DL (ref ?–100)
MICROALBUMIN UR-MCNC: 1.06 MG/DL
MICROALBUMIN/CREAT 24H UR-RTO: 9.8 UG/MG (ref ?–30)
NONHDLC SERPL-MCNC: 96 MG/DL (ref ?–130)
OSMOLALITY SERPL CALC.SUM OF ELEC: 302 MOSM/KG (ref 275–295)
POTASSIUM SERPL-SCNC: 4.4 MMOL/L (ref 3.5–5.1)
PROT SERPL-MCNC: 7.2 G/DL (ref 6.4–8.2)
SODIUM SERPL-SCNC: 143 MMOL/L (ref 136–145)
TRIGL SERPL-MCNC: 64 MG/DL (ref 30–149)
VLDLC SERPL CALC-MCNC: 10 MG/DL (ref 0–30)

## 2024-05-16 PROCEDURE — 82043 UR ALBUMIN QUANTITATIVE: CPT

## 2024-05-16 PROCEDURE — 83036 HEMOGLOBIN GLYCOSYLATED A1C: CPT

## 2024-05-16 PROCEDURE — 82570 ASSAY OF URINE CREATININE: CPT

## 2024-05-16 PROCEDURE — 83993 ASSAY FOR CALPROTECTIN FECAL: CPT

## 2024-05-16 PROCEDURE — 80061 LIPID PANEL: CPT

## 2024-05-16 PROCEDURE — 80053 COMPREHEN METABOLIC PANEL: CPT

## 2024-05-18 LAB — CALPROTECTIN STL-MCNT: 278 ΜG/G (ref ?–50)

## 2024-05-20 NOTE — PROGRESS NOTES
Elevated fecal calprotectin noted; will look to proceed with CTE as previously ordered and repeat colonoscopy; REach message sent

## 2024-05-29 ENCOUNTER — OFFICE VISIT (OUTPATIENT)
Dept: NEUROLOGY | Facility: CLINIC | Age: 59
End: 2024-05-29

## 2024-05-29 VITALS — SYSTOLIC BLOOD PRESSURE: 118 MMHG | DIASTOLIC BLOOD PRESSURE: 66 MMHG | RESPIRATION RATE: 18 BRPM | HEART RATE: 81 BPM

## 2024-05-29 DIAGNOSIS — G43.709 CHRONIC MIGRAINE W/O AURA W/O STATUS MIGRAINOSUS, NOT INTRACTABLE: Primary | ICD-10-CM

## 2024-05-29 PROCEDURE — 64615 CHEMODENERV MUSC MIGRAINE: CPT | Performed by: OTHER

## 2024-05-29 NOTE — PROGRESS NOTES
NEUROLOGY  [facility]     5/29/2024  CHRONIC MIGRAINE - BOTOX Injection  CPT: 15880    Vero Michel is a(n) 59 year old female with chronic migraine.  Patient is here for Botox injection.      ( x ) Headaches are frequent and based on screening procedures, satisfies criteria of chronic migraine:  >15 days a month, each occurrence can be > 4 hours duration.   Note is made that she has tried 2 or more prophylactic treatment in the past and has not responded that is why we are using Botox.      ( x ) Headaches have responded well to previous Botox injection.   Follow up injection necessary because headaches are recurring.  Patient is likewise familiar with the risks. These were reviewed and patient requests to proceed.    ROS:  No additional issues added after completing 10 point ROS      Current Outpatient Medications:     ALPRAZolam 1 MG Oral Tab, Take 1 tablet (1 mg total) by mouth 2 (two) times daily as needed for Sleep or Anxiety., Disp: 60 tablet, Rfl: 3    lamoTRIgine 200 MG Oral Tab, TAKE 1 TABLET BY MOUTH TWICE A DAY, Disp: 60 tablet, Rfl: 0    PEG 3350-KCl-Na Bicarb-NaCl 420 g Oral Recon Soln, Take as directed by physician., Disp: 4000 mL, Rfl: 0    NORTRIPTYLINE 25 MG Oral Cap, TAKE 1 CAPSULE BY MOUTH EVERY DAY NIGHTLY, Disp: 90 capsule, Rfl: 3    rosuvastatin 20 MG Oral Tab, Take 1 tablet (20 mg total) by mouth nightly., Disp: 90 tablet, Rfl: 0    Omeprazole 40 MG Oral Capsule Delayed Release, Take 1 capsule (40 mg total) by mouth daily., Disp: 90 capsule, Rfl: 0    insulin degludec (TRESIBA FLEXTOUCH) 100 UNIT/ML Subcutaneous Solution Pen-injector, INJECT DAILY AS DIRECTED UP TOTAL DAILY DOSE= 20 UNITS, Disp: 15 mL, Rfl: 1    Insulin Lispro, 1 Unit Dial, 100 UNIT/ML Subcutaneous Solution Pen-injector, INJECT 3X/DAY WITH MEALS AS DIRECTED UP TO TOTAL DAILY DOSE = 20 UNITS, Disp: 15 mL, Rfl: 1    Atogepant (QULIPTA) 30 MG Oral Tab, TAKE 1 TABLET BY MOUTH DAILY, Disp: 30 tablet, Rfl: 2     FLUCYTOSINE Does not apply Powder, 16% flucytosine cream, apply 5g nightly for 14 days, Disp: 1 each, Rfl: 0    Insulin Pen Needle (BD PEN NEEDLE ARCHANA U/F) 32G X 4 MM Does not apply Misc, Inject 1 Pen into the skin 4 (four) times daily. Use to inject insulin 4x/day as directed, Disp: 200 each, Rfl: 3    FREESTYLE TANIKA 3 SENSOR Does not apply Misc, 1 each every 14 (fourteen) days., Disp: 2 each, Rfl: 11    estradiol 0.1 MG/GM Vaginal Cream, 1 gram per vagina nightly for 2 weeks, then 1 gram per vagina three times per week for at least 10 weeks. May taper to 0.5 grams (pea-sized amount) nightly thereafter., Disp: 1 each, Rfl: 3    ZENPEP 76900-099294 units Oral Cap DR Particles, TAKE 1 CAPSULE BY MOUTH 3 TIMES DAILY BEFORE MEALS. PLEASE ALSO TAKE 1 CAPSULE WITH LARGE SNACKS., Disp: , Rfl:     Eletriptan Hydrobromide 40 MG Oral Tab, use at onset; may repeat once after 4 hours- ONLY 2 IN 24 HOUR PERIOD MAX.  This is a 30 day supply., Disp: 8 tablet, Rfl: 11    nortriptyline 10 MG Oral Cap, Take 1 capsule (10 mg total) by mouth nightly. Take with 25mg at bedtime po for total of 35mg at bedtime po., Disp: 90 capsule, Rfl: 1    FARXIGA 10 MG Oral Tab, TAKE 1 TABLET BY MOUTH EVERY DAY, Disp: 90 tablet, Rfl: 1    ondansetron (ZOFRAN) 4 mg tablet, TAKE 1 TABLET BY MOUTH TO BE TAKEN WITH THE FIRST DOSE OF MIGRAINE MEDICATION, Disp: 30 tablet, Rfl: 2    dicyclomine 10 MG Oral Cap, Take 1 capsule (10 mg total) by mouth 3 (three) times daily as needed (abd cramping)., Disp: 90 capsule, Rfl: 1    ketoconazole 2 % External Cream, , Disp: , Rfl:     Microlet Lancets Does not apply Misc, Test blood glucose twice daily, Disp: 200 each, Rfl: 11    folic acid 400 MCG Oral Tab, Take 1 tablet (400 mcg total) by mouth daily. (Patient not taking: Reported on 4/1/2024), Disp: , Rfl:     Fluticasone Propionate 50 MCG/ACT Nasal Suspension, USE 1 SPRAY IN EACH NOSTRIL TWICE A DAY, Disp: , Rfl:       /66 (BP Location: Right arm, Patient  Position: Sitting, Cuff Size: adult)   Pulse 81   Resp 18   LMP 10/21/2012 (LMP Unknown)   HENT:  Pink conjunctiva, anicteric sclerae, moist mucosa  Neck: No adenopathy or thyromegaly  Heart S1S2, no murmur  Lungs are clear, no wheezing  Extremities: no cyanosis or edema      PROCEDURE:  Assisted by: CMA or RN in room  BOTOX injection for chronic migraine:  Using alcohol prep, fixed site protocol performed.  Botox was reconstituted to the following concentration:  5 units per 0.1 ml.      Muscles, number of sites, units used and Side injected were as follows:                                                 Units used     Side  Procerus   5 units        center  Corrugators 2 sites  10 units      R/L  Frontalis   4 sites  20 units      R/L  Temporalis   4 sites each side  40 units      R/L  Occipitalis:            3 sites each side             30 units      R/L        Cervical paraspinals   2 sites each side 20 units      R/L  Upper trapezius   3 sites each side 30 units      R/L                                  TOTAL       155 units  Discarded:  45 units          IMPRESSION:  1. Chronic migraine w/o aura w/o status migrainosus, not intractable        Post injections instructions:  Expect response to start on the second or going into the 3rd week  Use ice packs and Tylenol ES if with pain at injections sites  Report any signs of infection or inflammation at site of injection  Use migraine medications the same was as before    After procedure check out process by PSRs and rooming RN/MA who were present during the procedure to assist.        Foster Landis MD  Vascular & General Neurology  [facility]

## 2024-05-29 NOTE — PATIENT INSTRUCTIONS
Refill policies:    Allow 2-3 business days for refills; controlled substances may take longer.  Contact your pharmacy at least 5 days prior to running out of medication and have them send an electronic request or submit request through the “request refill” option in your [employer] account.  Refills are not addressed on weekends; covering physicians do not authorize routine medications on weekends.  No narcotics or controlled substances are refilled after noon on Fridays or by on call physicians.  By law, narcotics must be electronically prescribed.  A 30 day supply with no refills is the maximum allowed.  If your prescription is due for a refill, you may be due for a follow up appointment.  To best provide you care, patients receiving routine medications need to be seen at least once a year.  Patients receiving narcotic/controlled substance medications need to be seen at least once every 3 months.  In the event that your preferred pharmacy does not have the requested medication in stock (e.g. Backordered), it is your responsibility to find another pharmacy that has the requested medication available.  We will gladly send a new prescription to that pharmacy at your request.    Scheduling Tests:    If your physician has ordered radiology tests such as MRI or CT scans, please contact Central Scheduling at [phone] right away to schedule the test.  Once scheduled, the [facility] Centralized Referral Team will work with your insurance carrier to obtain pre-certification or prior authorization.  Depending on your insurance carrier, approval may take 3-10 days.  It is highly recommended patients assure they have received an authorization before having a test performed.  If test is done without insurance authorization, patient may be responsible for the entire amount billed.      Precertification and Prior Authorizations:  If your physician has recommended that you have a procedure or additional testing performed the [facility]  Centralized Referral Team will contact your insurance carrier to obtain pre-certification or prior authorization.    You are strongly encouraged to contact your insurance carrier to verify that your procedure/test has been approved and is a COVERED benefit.  Although the [facility] Centralized Referral Team does its due diligence, the insurance carrier gives the disclaimer that \"Although the procedure is authorized, this does not guarantee payment.\"    Ultimately the patient is responsible for payment.   Thank you for your understanding in this matter.  Paperwork Completion:  If you require FMLA or disability paperwork for your recovery, please make sure to either drop it off or have it faxed to our office at [phone]. Be sure the form has your name and date of birth on it.  The form will be faxed to our Forms Department and they will complete it for you.  There is a 25$ fee for all forms that need to be filled out.  Please be aware there is a 10-14 day turnaround time.  You will need to sign a release of information (EVANGELINA) form if your paperwork does not come with one.  You may call the Forms Department with any questions at [phone].  Their fax number is [phone].

## 2024-05-29 NOTE — PROGRESS NOTES
Paperwork noting that patient may bear financial responsibility for procedure(s) performed in clinic today signed prior to proceeding with procedure(s).    Furthermore, patient notified that they should contact their insurer to verify that your procedure/test has been approved and is a COVERED benefit.  Although the [facility] staff does its due diligence, the insurance carrier gives the disclaimer that \"Although the procedure is authorized, this does not guarantee payment.\"    Ultimately the patient is responsible for payment.    Botox is:  [x] Buy and Bill  [] Patient Supplied      Botox Reauthorization Questions:  Has the patient experienced a reduction in frequency of migraines since starting Botox? yes  If yes, by what percentage? 50%  Has the intensity of migraines decreased since starting Botox? yes  If yes, by what percentage? 50%    [x] I have discussed with patient that if their insurance changes they must contact the office right away with that information so that a new prior authorization can be completed.  Patient verbalized understanding that Botox cannot be performed without a current prior authorization in place with correct insurance.

## 2024-06-14 ENCOUNTER — TELEPHONE (OUTPATIENT)
Dept: ENDOCRINOLOGY CLINIC | Facility: CLINIC | Age: 59
End: 2024-06-14

## 2024-06-14 DIAGNOSIS — E11.65 TYPE 2 DIABETES MELLITUS WITH HYPERGLYCEMIA, WITH LONG-TERM CURRENT USE OF INSULIN (HCC): Primary | ICD-10-CM

## 2024-06-14 DIAGNOSIS — Z79.4 TYPE 2 DIABETES MELLITUS WITH HYPERGLYCEMIA, WITH LONG-TERM CURRENT USE OF INSULIN (HCC): Primary | ICD-10-CM

## 2024-06-14 RX ORDER — INSULIN ASPART 100 [IU]/ML
INJECTION, SOLUTION INTRAVENOUS; SUBCUTANEOUS
Qty: 15 ML | Refills: 0 | Status: SHIPPED | OUTPATIENT
Start: 2024-06-14

## 2024-06-14 NOTE — TELEPHONE ENCOUNTER
Trouble submitting PA for tresiba via [facility] - faxed paper form to [employer] at [phone]. Held onto copy

## 2024-06-14 NOTE — TELEPHONE ENCOUNTER
Called pt - new insurance as of 6/2024, [facility] PPO plan, she will send a picture of the card when she's done driving    As far as the low BS, she did not double check with a fingerstick as it happened at around 3 AM. She wears sensor on abd so doesn't think it is from pressure on sensor.       Pulled katya report for covering provider review

## 2024-06-14 NOTE — TELEPHONE ENCOUNTER
Hypoglycemia x 3 events but not measured on CGM report     1st event: 6-1 at 7 pm (? After dinner) ; other 2 events 6-8 and 6-9-24) ~  3 am   One appeared to be sensor issue (erratic wave form) but the other seemed to be accurate low pattern     Per last note: increase Tresiba to 16 units ---> please Tresiba to 15 units once daily       Name: Vero Michel  YOB: 1965  Report Period: 06/01/2024 - 06/14/2024 (14 days)  Generated: 06/14/2024  % Time CGM Active: 97%      Glucose Statistics and Targets  Average Glucose: 146 mg/dL  Glucose Management Indicator (GMI): 6.8%  Glucose Variability (%CV): 18.0%  Target Range: 70 - 180 mg/dL        Time in Ranges  Very High: >250 mg/dL --- 0%  High: 181 - 250 mg/dL --- 10%  Target Range: 70 - 180 mg/dL --- 90%  Low: 54 - 69 mg/dL --- 0%  Very Low: <54 mg/dL --- 0%

## 2024-06-14 NOTE — TELEPHONE ENCOUNTER
Fax received from [place] requesting PRIOR AUTHORIZATION for Tresiba and Humalog.    Called pharmacy, looks like the medication is being run through [employer] ( her old insurance).    Sent message to patient to call pharmacy to update insurance information.

## 2024-06-14 NOTE — TELEPHONE ENCOUNTER
Pt sent [employer] card - shady updated in pt's chart    Started PA for tresiba U100  Key [id number]    Per [employer] formulary list, should be able to send novolog instead. Pended if okay

## 2024-06-17 ENCOUNTER — TELEPHONE (OUTPATIENT)
Dept: ENDOCRINOLOGY CLINIC | Facility: CLINIC | Age: 59
End: 2024-06-17

## 2024-06-17 NOTE — TELEPHONE ENCOUNTER
See TE 6/14/24 -   Per insurance formulary novolog should be covered, will call pharmacy to confirm when they open    Also submitted PA for tresiba, received fax from [employer] that the product does not require an authorization - will check on this when I call pharmacy as well

## 2024-06-17 NOTE — TELEPHONE ENCOUNTER
Called [facility] to check on rx - since pt has switched insurance, they have been transferring all of her prescriptions    Called pt to see which pharmacy she's using now - [facility] [place]. Updated in chart    [facility]ed [facility] - the tresiba is ready for pickup for $40, and the HUMALOG rx is going through for $35

## 2024-06-20 ENCOUNTER — PATIENT MESSAGE (OUTPATIENT)
Dept: NEUROLOGY | Facility: CLINIC | Age: 59
End: 2024-06-20

## 2024-06-20 DIAGNOSIS — G43.709 CHRONIC MIGRAINE W/O AURA W/O STATUS MIGRAINOSUS, NOT INTRACTABLE: ICD-10-CM

## 2024-06-20 RX ORDER — ATOGEPANT 30 MG/1
TABLET ORAL
Qty: 90 TABLET | Refills: 0 | Status: SHIPPED | OUTPATIENT
Start: 2024-06-20

## 2024-06-20 NOTE — TELEPHONE ENCOUNTER
Medication: Qulipta     Date of last refill: 3/25/24  [place]     Last office visit: 5/29/24  Due back to clinic per last office note:  3 months  Date next office visit scheduled:    Future Appointments   Date Time Provider Department Center   6/25/2024  2:15 PM Eduar Moreno,  [facility]   7/15/2024  9:40 AM Benoit Mallory MD [facility] None   8/6/2024 10:00 AM Lenore Henriquez APRN [facility]   9/3/2024  8:40 AM Foster Landis MD [facility] [place]           Last OV note recommendation:    Per Dr Landis (botox):  Expect response to start on the second or going into the 3rd week  Use ice packs and Tylenol ES if with pain at injections sites  Report any signs of infection or inflammation at site of injection  Use migraine medications the same was as before

## 2024-06-20 NOTE — TELEPHONE ENCOUNTER
From: Vero Michel  To: Foster Landis  Sent: 6/20/2024 11:05 AM CDT  Subject: Change in pharmacy     I have changed pharmacy location. I am now using [facility] [place]. [phone]. Could you please have a qulipta order sent there. [facility] not [facility]. The pharmacist said perhaps it had needed a renewal.    ThanksSarah

## 2024-06-21 ENCOUNTER — TELEPHONE (OUTPATIENT)
Dept: NEUROLOGY | Facility: CLINIC | Age: 59
End: 2024-06-21

## 2024-06-21 ENCOUNTER — PATIENT MESSAGE (OUTPATIENT)
Facility: CLINIC | Age: 59
End: 2024-06-21

## 2024-06-21 NOTE — TELEPHONE ENCOUNTER
From: Vero Michel  To: Nataly Hoyt  Sent: 6/21/2024 12:42 PM CDT  Subject: Fungal infection again?     My skin is all raw again. I have been trying various methods to handle it. I don't know why this happens. I don't know if I need the nystatin again or what. Please advise.     ThanksSarah

## 2024-06-24 ENCOUNTER — TELEPHONE (OUTPATIENT)
Dept: UROLOGY | Facility: CLINIC | Age: 59
End: 2024-06-24

## 2024-06-24 NOTE — TELEPHONE ENCOUNTER
Response received from [employer] states that prescription is covered for 30 tablets per 30 days    Spoke w/ pharmacy, states that still having trouble filling prescription  Pharmacy rep states they will \"run it again tomorrow\" and call back with questions if needed    Holding for response

## 2024-06-24 NOTE — TELEPHONE ENCOUNTER
TC from pt reported she recently quit her job, so she is unable to afford PFPT. Would like to move onto pessary fitting. Per Dr Mallory, okay to schedule w/ PA. Will have  schedule.

## 2024-06-25 RX ORDER — NYSTATIN 100000 U/G
CREAM TOPICAL
Qty: 30 EACH | Refills: 0 | Status: SHIPPED | OUTPATIENT
Start: 2024-06-25

## 2024-06-25 NOTE — TELEPHONE ENCOUNTER
Patient aware we are working on her prior authorization for nystatin suppository and will send to [place] pharmacy. Patient verbalized understanding, agreed to and intend to comply with plan of care.

## 2024-06-25 NOTE — TELEPHONE ENCOUNTER
Rx order faxed to [facility] [facility].     Spoke to Meet from [employer], requested to refax PA form to [phone].

## 2024-06-26 ENCOUNTER — PATIENT MESSAGE (OUTPATIENT)
Dept: NEUROLOGY | Facility: CLINIC | Age: 59
End: 2024-06-26

## 2024-06-26 ENCOUNTER — PATIENT MESSAGE (OUTPATIENT)
Dept: ENDOCRINOLOGY CLINIC | Facility: CLINIC | Age: 59
End: 2024-06-26

## 2024-06-26 DIAGNOSIS — G43.709 CHRONIC MIGRAINE W/O AURA W/O STATUS MIGRAINOSUS, NOT INTRACTABLE: ICD-10-CM

## 2024-06-26 RX ORDER — ATOGEPANT 30 MG/1
TABLET ORAL
Qty: 30 TABLET | Refills: 11 | Status: SHIPPED | OUTPATIENT
Start: 2024-06-26

## 2024-06-26 NOTE — TELEPHONE ENCOUNTER
From: Vero Michel  To: Foster Landis  Sent: 6/26/2024 1:10 PM CDT  Subject: Qulipta    My insurance changed and another prior authorization is needed for quilpta. I believe a message was given to you and insurance told me they sent the paperwork. I am calling again to make sure. Despite my recent botox injection I am having many migraines. Perhaps my body is requiring the qulipta more quickly than insurance can facilitate.     Please let me know if there is any way I can help the process.     Thank you

## 2024-06-26 NOTE — TELEPHONE ENCOUNTER
Nystatin suppositor order called to [facility]. Request for pharmacy to fax over ingredients for PA.

## 2024-06-26 NOTE — TELEPHONE ENCOUNTER
Spoke to [employer] needs list of compound medication on form. Discussed that [facility] does not process information w insurance, form refax.

## 2024-06-26 NOTE — TELEPHONE ENCOUNTER
List of compound medication refax to [employer].     Patient reached out to [facility] in [place] will pay out of pocket if insurance denies coverage. Will call pt once we receive determination.

## 2024-06-26 NOTE — TELEPHONE ENCOUNTER
Spoke w/ pharmacy, notified insurance will cover #30 for 30 day supply  Per pharmacy, new PA needed w/ changed dispense quantity d/t insurance    PA requested for Qulipta  Clinical questions answered and submitted to insurance  Awaiting coverage determination

## 2024-06-26 NOTE — TELEPHONE ENCOUNTER
Patient would like diet recommendations: wants to know what she should focus on eating primarily especially protein since she is not eating much, and Humalog dosing)    Hyperglycemia triage:   Confirm current Diabetes medications with patient (not from chart note)   (Had not been dosing related to poor intake, since Saturday afternoon) Humalog sliding scale: 150-200 mg/dl = 2 units, 201-250 mg/dl = 4 units, 251-300 mg/dl = 6 units, 301-350 mg/dl = 8 units, > 350 mg/dl = 10 units tid w/meals  Tresiba 16 units injection daily (did not dose Monday, did dose yesterday)  Onset, frequency and duration of symptoms?   GI upset started Saturday afternoon-diarrhea, feeling unwell- better on Sunday and then bad again since Monday  Recent illness or steroid prescription/injection?   Stomach bug started Saturday, felt better so ate fruit on Sunday, felt unwell again Monday- very little food intake- GI issues, diarrhea, Migraine today- (related to low food intake), yeast infection (OB wants to increase protein to help with this.    Obtain Blood sugar readings: BG log or CGM?  Ale- not streaming, Ale showed in [employer] range 82% of time last 7 days, 83% last 14 days. Readings recently include 164 last night, Took Tresiba and ate crackers, Today- 3:00 AM- 150, 6:00   Changes in diet? Changes in any medications?   Little food intake related to GI symptoms. Started Abilify yesterday

## 2024-06-26 NOTE — TELEPHONE ENCOUNTER
From: Vero Michel  To: Lenore Henriquez  Sent: 6/26/2024 11:54 AM CDT  Subject: Sugars and GI bug    I got a GI bug over the weekend. Brat diet is not particularly good for diabetics! I did not do any insulin. Monday I went back to tresiba. I rarely do novolog anyways and will not do it for a bit purposely. I again have diarrhea today. I took tresiba last night. I will take advice on how to eat today! I also have migraines from this poor food intake as well as a return of the yeast infection were my skin pain prevents most activety. OK also add that I started abilify yesterday. Which of course may elevate my sugar!     I am a hot mess. My mood traditionally is horrible with this amount of pain and limit on the quality of life. Plus i left my job as i did not feel my performance was effective. AND My daughter will be testing for [employer] in August. I am trying to decide whether to do it but it feels odd while searching for a new job.     Just giving you a life update when I really just wanted diet help while having GI trouble! Lol    Thanks for virtually listening!   Sarah

## 2024-06-27 NOTE — TELEPHONE ENCOUNTER
Received fax from [employer]   Approval received for patient use of Qulipta           Pt notified

## 2024-06-27 NOTE — TELEPHONE ENCOUNTER
Payer request for pharmacy claim to be able to process PA request for Rx nystatin supp.   Called [facility] in [place], phone rings, no answer.

## 2024-07-13 DIAGNOSIS — N18.32 TYPE 2 DIABETES MELLITUS WITH STAGE 3B CHRONIC KIDNEY DISEASE, WITHOUT LONG-TERM CURRENT USE OF INSULIN (HCC): ICD-10-CM

## 2024-07-13 DIAGNOSIS — E11.22 TYPE 2 DIABETES MELLITUS WITH STAGE 3B CHRONIC KIDNEY DISEASE, WITHOUT LONG-TERM CURRENT USE OF INSULIN (HCC): ICD-10-CM

## 2024-07-15 ENCOUNTER — OFFICE VISIT (OUTPATIENT)
Dept: UROLOGY | Facility: CLINIC | Age: 59
End: 2024-07-15
Attending: OBSTETRICS & GYNECOLOGY
Payer: COMMERCIAL

## 2024-07-15 VITALS — TEMPERATURE: 98 F | BODY MASS INDEX: 26 KG/M2 | RESPIRATION RATE: 18 BRPM | HEIGHT: 64.02 IN

## 2024-07-15 DIAGNOSIS — R39.14 FEELING OF INCOMPLETE BLADDER EMPTYING: ICD-10-CM

## 2024-07-15 DIAGNOSIS — N81.84 PELVIC MUSCLE WASTING: ICD-10-CM

## 2024-07-15 DIAGNOSIS — N95.2 POSTMENOPAUSAL ATROPHIC VAGINITIS: ICD-10-CM

## 2024-07-15 DIAGNOSIS — N81.6 RECTOCELE: ICD-10-CM

## 2024-07-15 DIAGNOSIS — N81.11 CYSTOCELE, MIDLINE: Primary | ICD-10-CM

## 2024-07-15 PROCEDURE — 87086 URINE CULTURE/COLONY COUNT: CPT | Performed by: PHYSICIAN ASSISTANT

## 2024-07-15 PROCEDURE — 51701 INSERT BLADDER CATHETER: CPT | Performed by: PHYSICIAN ASSISTANT

## 2024-07-15 PROCEDURE — 57160 INSERT PESSARY/OTHER DEVICE: CPT

## 2024-07-15 PROCEDURE — 99212 OFFICE O/P EST SF 10 MIN: CPT

## 2024-07-15 RX ORDER — DAPAGLIFLOZIN 10 MG/1
10 TABLET, FILM COATED ORAL DAILY
Qty: 30 TABLET | Refills: 0 | Status: SHIPPED | OUTPATIENT
Start: 2024-07-15

## 2024-07-15 NOTE — PROGRESS NOTES
Pt here for pessary trial due to bulge    Wants to exhaust conservative options    Pt is using vaginal cream 3x weekly  Currently using nystatin vag suppository for fungal infection from GYN office    She is not currently interested in surgical management of her pelvic organ prolapse  Desires home care    Bowels constipated    Previously tried:  PFPT    Urogynecology Summary:  Urogynecology Summary  Prolapse: Yes  REE: No  Urge Incontinence: No  Nocturia Frequency: 1  Frequency: Greater than 3 hours (every 3-4 hours)  Incomplete emptying: Yes  Constipation: Yes  Wears pad day?: 0  Wears Pad Night?: 0  Activities are limited by UI/POP?: Yes (avoid activities d/t bulge)  Currently Sexually Active: No  Avoids sexual activity due to: Pain    Vitals:  Vitals:    07/15/24 0945   Resp: 18   Temp: 97.9 °F (36.6 °C)       GENERAL EXAM:  GENERAL: alert & oriented, NAD  HEENT: NC/AT, sclera without injection  SKIN: no rashes  LUNGS:  without increased respiratory effort  ABDOMEN: soft, non-tender, non-distended, no masses appreciated  EXT: no edema    PELVIC EXAM: LAMONTE Villavicencio, present for exam as a chaperone  Ext. Gen: +atrophy, no lesions  Urethra: +atrophy, nontender,   Bladder: +fullness, nontender  Vagina: +atrophy, no lesions  Cervix: no bleeding, no lesions, nontender  Uterus: mobile    PELVIS FLOOR NEUROMUSCULAR FUNCTION:  Strength:  1  Perineal Sensation:  Normal    PELVIC SUPPORT:  [place]:  1  Ant:  2  Post:  1-2  CST:  negative  UVJ: hypermobile      A #3 ring with support was placed without difficulty, unable to urinate, removed    A #2 ring with support was placed, expelled with valsalva    A #2 small knob incontinence dish was placed without difficulty, pt reported it to be comfortable and supportive.  Initially unable to void in BR.  Given bottled water to drink.    Pt voided 200 mL in the privacy of the bathroom  After obtaining verbal consent from the patient, sterile technique used to prep urethra and collect  urine.  PVR 30 mL.  Sent for culture.    Able to remove and insert independently.    Impression:    ICD-10-CM    1. Cystocele, midline  N81.11       2. Rectocele  N81.6       3. Feeling of incomplete bladder emptying  R39.14 Straight Cath PVR     Urine Culture, Routine      4. Postmenopausal atrophic vaginitis  N95.2       5. Pelvic muscle wasting  N81.84           Discussion Items:   Discussed mgmt of vulvovaginal atrophy with vaginal estrogen cream. Reviewed associated benefits, risks, alternatives, and goals. Recommend low dose 3x/week mgmt.    Bowel management reviewed. Discussed using fiber daily w/ addition of miralax as needed.    Discussed management of pelvic organ prolapse including but not limited to behavioral modifications, conservative options, and surgical management.   Discussed pessary management including benefits and risks. Discussed importance of keeping regularly scheduled pessary checks in prevention of complications related to pessary use.     Treatment Plan:  Follow UCx result, tx if +  Continue pessary management with plan for home care  Continue low dose vag estrogen as prescribed  Call with s/sx of UTI, problems with pessary   All questions answered  She understands and agrees to plan    Return in about 4 weeks (around 8/12/2024) for pessary check.      Komal Puente PA-C    Note to patient: The 21st Century Cures Act makes medical notes like these available to patients in the interest of transparency.  However, be advised this is a medical document.  It is intended as peer to peer communication.  It is written in medical language and may contain abbreviations or verbiage that are unfamiliar.  It may appear blunt or direct.  Medical documents are intended to carry relevant information, facts as evident, and the clinical opinion of the practitioner.

## 2024-07-15 NOTE — TELEPHONE ENCOUNTER
Requested Prescriptions     Pending Prescriptions Disp Refills    FARXIGA 10 MG Oral Tab [Pharmacy Med Name: Farxiga 10 Mg Tab Astr] 30 tablet 0     Sig: TAKE ONE TABLET BY MOUTH ONE TIME DAILY     Your appointments       Date & Time Appointment Department (Center)    Jul 15, 2024 9:40 AM CDT Uro Follow Up Pre/Post Op with Komal Puente PA [facility] [place] [facility] (--)        Jul 30, 2024 2:00 PM CDT Video Visit with Eduar Moreno DO [facility] )    Please verify your telehealth insurance benefits prior to your appointment.    You must be in the [facility] during the virtual visit.     Please use the [employer] Mobile Usha and launch the video visit 10 minutes prior to your scheduled appointment time to ensure your camera and microphone are working properly. Once the video visit has started you will be placed in a waiting room until the provider begins the visit.     You will receive an email confirmation with instructions.  If you have questions, call your doctor's office directly.    If you are having issues or need to use a desktop/laptop, please follow the below steps:        1.       Close out all other open apps (could be competing for audio resources)  2.       Disable Bluetooth  3.       Reboot mobile device before joining the video  4.       Come off Wi-Fi and switch over to Data    Please see our Video Visit Tip Sheet if you need additional assistance.     If you believe this is an emergency, please dial 911 immediately.          Aug 06, 2024 10:00 AM CDT Diabetes Pump follow up with Lenore Henriquez APRN [address] ([facility] [place])        Sep 03, 2024 8:40 AM CDT Botox Procedure with Foster Landis MD [facility] ([facility] [place])              [address]  [facility] [place]  [address] [place] [address]  [facility] [id number] [phone] [facility]  [address] [facility] [address]  [facility] [id number] [phone] [facility]   [address] [facility] [id number] [phone]    [facility] [place] [facility]  [facility] [address]  [facility] [id number] [phone]          Last A1c value was 7.4% done 5/16/2024.    Refill 10/23/23  LOV 4/29/24

## 2024-07-16 ENCOUNTER — PATIENT MESSAGE (OUTPATIENT)
Dept: ENDOCRINOLOGY CLINIC | Facility: CLINIC | Age: 59
End: 2024-07-16

## 2024-07-17 ENCOUNTER — TELEPHONE (OUTPATIENT)
Dept: UROLOGY | Facility: CLINIC | Age: 59
End: 2024-07-17

## 2024-07-17 RX ORDER — AMOXICILLIN 500 MG/1
500 CAPSULE ORAL 2 TIMES DAILY
Qty: 14 CAPSULE | Refills: 0 | Status: SHIPPED | OUTPATIENT
Start: 2024-07-17 | End: 2024-07-24

## 2024-07-17 NOTE — TELEPHONE ENCOUNTER
From: Vero Michel  To: Lenore Henriquez  Sent: 7/16/2024 5:37 PM CDT  Subject: CGM    I had a problem with insurance and then I got my monitors and the first one kept beeping me with critical lows which did not correspond to symptoms or my personal device. I placed the second one and this time I do believe I was having a critical low and it gave me a result WNL. Very frustrating but the company is sending me two new monitors. You will not have data for a while.     Also, I never take the insulin anymore. My anxiety comes and goes in waves of course. I have started Latuda and CBD and I believe that is helping. I am having a terrible time with my tresiba as my memory is so poor. I have a chart to monitor what I do but I am not giving it consistently because of this. For that reason I would like to revist the weekly injection. Would you consider ozempic? The trulicity was not effective in the past.    Thanks   Sarah

## 2024-07-17 NOTE — TELEPHONE ENCOUNTER
Emailed report for provider review (some days missing), can wait until provider is back in office tomorrow. Pt has FU 8/06    Name: Vero Michel  YOB: 1965  Report Period: 07/03/2024 - 07/16/2024 (14 days)  Generated: 07/17/2024  % Time CGM Active: 45%      Glucose Statistics and Targets  Average Glucose: 125 mg/dL  Glucose Management Indicator (GMI): 6.3%  Glucose Variability (%CV): 23.8%  Target Range: 70 - 180 mg/dL      Time in Ranges  Very High: >250 mg/dL --- 0%  High: 181 - 250 mg/dL --- 6%  Target Range: 70 - 180 mg/dL --- 94%  Low: 54 - 69 mg/dL --- 0%  Very Low: <54 mg/dL --- 0%

## 2024-07-17 NOTE — TELEPHONE ENCOUNTER
Outgoing call to inform of + uti  TOBIN Puente PA  Start amoxicillin 500 mg - 1 tab PO BID x 7 days   Call office if sx do not improve     Allergies and pharmacy verified    Pt states she called Monday to inform pessary fell out.  Reports ongoing constipation  Reveiwed Bowel regimen with pt-to add benefiber   Has a larger pessary at home (which was provided during last PA visit.#3 r/s)  Plans to get bowels moving, tx uti and try different pessary at this time.  Will f/u with PA if above pessary falls out or causes discomfort.

## 2024-07-21 DIAGNOSIS — G43.711 MIGRAINE, CHRONIC, WITHOUT AURA, INTRACTABLE, WITH STATUS MIGRAINOSUS: ICD-10-CM

## 2024-07-22 RX ORDER — ONDANSETRON 4 MG/1
TABLET, FILM COATED ORAL
Qty: 30 TABLET | Refills: 2 | Status: SHIPPED | OUTPATIENT
Start: 2024-07-22

## 2024-07-24 DIAGNOSIS — E78.2 MIXED HYPERLIPIDEMIA: ICD-10-CM

## 2024-07-24 RX ORDER — ROSUVASTATIN CALCIUM 20 MG/1
20 TABLET, COATED ORAL NIGHTLY
Qty: 90 TABLET | Refills: 0 | Status: SHIPPED | OUTPATIENT
Start: 2024-07-24

## 2024-07-30 ENCOUNTER — TELEPHONE (OUTPATIENT)
Dept: UROLOGY | Facility: CLINIC | Age: 59
End: 2024-07-30

## 2024-07-30 DIAGNOSIS — N18.32 TYPE 2 DIABETES MELLITUS WITH STAGE 3B CHRONIC KIDNEY DISEASE, WITHOUT LONG-TERM CURRENT USE OF INSULIN (HCC): ICD-10-CM

## 2024-07-30 DIAGNOSIS — E11.22 TYPE 2 DIABETES MELLITUS WITH STAGE 3B CHRONIC KIDNEY DISEASE, WITHOUT LONG-TERM CURRENT USE OF INSULIN (HCC): ICD-10-CM

## 2024-07-30 NOTE — TELEPHONE ENCOUNTER
TC from patient w/questions about pessary  Has 2 pessaries at home  #2 sm knob incont dish that fell out at home w/first void  #3 ring w/support that was placed and removed in office d/t unable to void    Reports tried to place #3 ring w/support at home but it opened up before getting fully inside vagina and pt's skin ripped and bled  States has not been using vag estrace d/t having yeast infection requiring daily vag suppositories, she thought she could not use both  Finished w/vag supp now  Voids freely  Bowels irreg    Pt tearful d/t currently not working and receiving over $200 bill for pessary fitting  Reluctant to come back for repeat pessary fitting    Plan to restart vag estrace cream   Has follow up appt w/Komal LEROY 8/19/24 for pessary check  Pt understands she can keep that appt or she can reschedule once she has been using vag estrace more consistently and skin less irritated  Bowel regimen also discussed d/t irreg bowels

## 2024-07-31 RX ORDER — DAPAGLIFLOZIN 10 MG/1
10 TABLET, FILM COATED ORAL DAILY
Qty: 30 TABLET | Refills: 0 | Status: SHIPPED | OUTPATIENT
Start: 2024-07-31

## 2024-07-31 NOTE — TELEPHONE ENCOUNTER
Requested Prescriptions     Pending Prescriptions Disp Refills    FARXIGA 10 MG Oral Tab [Pharmacy Med Name: Farxiga 10 Mg Tab Astr] 30 tablet 0     Sig: TAKE ONE TABLET BY MOUTH ONE TIME DAILY     HGBA1C:    Lab Results   Component Value Date    A1C 7.4 (H) 05/16/2024    A1C 8.5 (A) 01/29/2024    A1C 7.9 (A) 08/31/2023     (H) 05/16/2024     Your Appointments      Tuesday August 06, 2024 10:00 AM  Diabetes Pump follow up with KYLIE Torres  [facility] [address] ([facility] [place]) [address] [address]  [facility] [phone]        Monday August 19, 2024 10:20 AM  Uro Follow Up Pre/Post Op with RAD Salinas  [facility] [place] [facility] (--) [address] [facility] [address]  [facility] [address] [phone]        Tuesday September 03, 2024 8:40 AM  Botox Procedure with Foster Landis MD  [facility] ([facility] [place]) [address] [facility] [address]  [facility] [id number] [phone]        Wednesday October 02, 2024 4:45 PM  Video Visit with Eduar Moreno DO  [facility] ([facility] ) [address] [facility] [id number] [phone]   Please verify your telehealth insurance benefits prior to your appointment.    You must be in the [facility] during the virtual visit.     Please use the [employer] Mobile Usha and launch the video visit 10 minutes prior to your scheduled appointment time to ensure your camera and microphone are working properly. Once the video visit has started you will be placed in a waiting room until the provider begins the visit.     You will receive an email confirmation with instructions.  If you have questions, call your doctor's office directly.    If you are having issues or need to use a desktop/laptop, please follow the below steps:        1.       Close out all other open apps (could be competing for  audio resources)  2.       Disable Bluetooth  3.       Reboot mobile device before joining the video  4.       Come off Wi-Fi and switch over to Data    Please see our Video Visit Tip Sheet if you need additional assistance.     If you believe this is an emergency, please dial 911 immediately.                Last OFFICE VISIT: 4--DH    Last Refill:  7---30 tabs with 0 refills

## 2024-08-02 ENCOUNTER — MED REC SCAN ONLY (OUTPATIENT)
Dept: FAMILY MEDICINE CLINIC | Facility: CLINIC | Age: 59
End: 2024-08-02

## 2024-08-06 ENCOUNTER — OFFICE VISIT (OUTPATIENT)
Dept: ENDOCRINOLOGY CLINIC | Facility: CLINIC | Age: 59
End: 2024-08-06
Payer: COMMERCIAL

## 2024-08-06 ENCOUNTER — TELEPHONE (OUTPATIENT)
Dept: NEUROLOGY | Facility: CLINIC | Age: 59
End: 2024-08-06

## 2024-08-06 ENCOUNTER — PATIENT MESSAGE (OUTPATIENT)
Dept: FAMILY MEDICINE CLINIC | Facility: CLINIC | Age: 59
End: 2024-08-06

## 2024-08-06 DIAGNOSIS — N18.32 TYPE 2 DIABETES MELLITUS WITH STAGE 3B CHRONIC KIDNEY DISEASE, WITHOUT LONG-TERM CURRENT USE OF INSULIN (HCC): Primary | ICD-10-CM

## 2024-08-06 DIAGNOSIS — E11.22 TYPE 2 DIABETES MELLITUS WITH STAGE 3B CHRONIC KIDNEY DISEASE, WITHOUT LONG-TERM CURRENT USE OF INSULIN (HCC): Primary | ICD-10-CM

## 2024-08-06 DIAGNOSIS — K86.81 EXOCRINE PANCREATIC INSUFFICIENCY (HCC): Primary | ICD-10-CM

## 2024-08-06 NOTE — TELEPHONE ENCOUNTER
Msg from [facility] pharmacy stating \"patient is expecting 60mg dose vs 30mg\"    Per chart review, no H/O pt taking Qulipta 60mg.  No mention in alternate encounters of dosage increase    [facility] msg sent to pt to clarify

## 2024-08-06 NOTE — PROGRESS NOTES
Patient checked into appointment and contacted Diabetes Services  to inform that she was at home and thought appointment was a virtual appointment.  Patient due for A1c and was not streaming CGM data for review.  Patient was asked to reschedule for in office appointment.  Patient was offered next available appointment with option to add name to wait list for sooner appointment.  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      Lenore ESPINAL, BC-ADM, [facility]

## 2024-08-07 NOTE — TELEPHONE ENCOUNTER
I can see her for a diabetes follow up while she is waiting for next appt.  I typically recommend Me[facility] Ann as I think she does well for patients.

## 2024-08-13 NOTE — TELEPHONE ENCOUNTER
Per record, pt read [employer] message w/o response.    Closing encounter as no further information provided

## 2024-08-14 NOTE — TELEPHONE ENCOUNTER
Please refer her to [facility] instead since [facility] is not allowing transfer.  I can see her whenever for her diabetes follow up.

## 2024-08-19 ENCOUNTER — TELEPHONE (OUTPATIENT)
Dept: UROLOGY | Facility: CLINIC | Age: 59
End: 2024-08-19

## 2024-08-19 NOTE — TELEPHONE ENCOUNTER
I CONTACTED PATIENT TO RESCHEDULE HER ONE MONTH PESSARY CHECK.  SHE SAID SHE DOES NOT HAVE A PESSARY IN AT THIS TIME AND THAT HER SKIN IS SORE AND SHE WILL HAVE TO WAIT TO SCHEDULE A PESSARY FITTING.

## 2024-08-20 ENCOUNTER — PATIENT MESSAGE (OUTPATIENT)
Dept: FAMILY MEDICINE CLINIC | Facility: CLINIC | Age: 59
End: 2024-08-20

## 2024-08-20 ENCOUNTER — TELEPHONE (OUTPATIENT)
Facility: CLINIC | Age: 59
End: 2024-08-20

## 2024-08-20 NOTE — TELEPHONE ENCOUNTER
Spoke with pharmacist. Requesting refill on strips. Advised pharmacist a different provider has ordered them. Understanding verbalized.

## 2024-08-21 NOTE — TELEPHONE ENCOUNTER
From: Vero Michel  Sent: 8/20/2024 5:02 PM CDT  To: [facility]  Subject: Monitor strips    There is a misunderstanding. I no longer see Lenore Henriquez and Dr Melchor agreed to take over the care of my diabetes until I have an appointment with my new endocrinologist in November. I am sure he will not mind ordering test strips. Can you please check with him and then let me know how to proceed with my pharmacy.

## 2024-08-26 ENCOUNTER — OFFICE VISIT (OUTPATIENT)
Dept: FAMILY MEDICINE CLINIC | Facility: CLINIC | Age: 59
End: 2024-08-26
Payer: COMMERCIAL

## 2024-08-26 ENCOUNTER — TELEPHONE (OUTPATIENT)
Facility: CLINIC | Age: 59
End: 2024-08-26

## 2024-08-26 VITALS
HEART RATE: 84 BPM | SYSTOLIC BLOOD PRESSURE: 110 MMHG | HEIGHT: 64 IN | WEIGHT: 151 LBS | DIASTOLIC BLOOD PRESSURE: 70 MMHG | RESPIRATION RATE: 16 BRPM | BODY MASS INDEX: 25.78 KG/M2

## 2024-08-26 DIAGNOSIS — R10.2 SUPRAPUBIC PAIN: Primary | ICD-10-CM

## 2024-08-26 LAB
APPEARANCE: CLEAR
BILIRUBIN: NEGATIVE
GLUCOSE (URINE DIPSTICK): >=1000 MG/DL
KETONES (URINE DIPSTICK): NEGATIVE MG/DL
LEUKOCYTES: NEGATIVE
MULTISTIX LOT#: ABNORMAL NUMERIC
NITRITE, URINE: NEGATIVE
PH, URINE: 5.5 (ref 4.5–8)
PROTEIN (URINE DIPSTICK): NEGATIVE MG/DL
SPECIFIC GRAVITY: 1.01 (ref 1–1.03)
URINE-COLOR: YELLOW
UROBILINOGEN,SEMI-QN: 0.2 MG/DL (ref 0–1.9)

## 2024-08-26 PROCEDURE — 81003 URINALYSIS AUTO W/O SCOPE: CPT | Performed by: FAMILY MEDICINE

## 2024-08-26 PROCEDURE — G2211 COMPLEX E/M VISIT ADD ON: HCPCS | Performed by: FAMILY MEDICINE

## 2024-08-26 PROCEDURE — 3052F HG A1C>EQUAL 8.0%<EQUAL 9.0%: CPT | Performed by: FAMILY MEDICINE

## 2024-08-26 PROCEDURE — 99214 OFFICE O/P EST MOD 30 MIN: CPT | Performed by: FAMILY MEDICINE

## 2024-08-26 PROCEDURE — 87086 URINE CULTURE/COLONY COUNT: CPT | Performed by: FAMILY MEDICINE

## 2024-08-26 PROCEDURE — 3051F HG A1C>EQUAL 7.0%<8.0%: CPT | Performed by: FAMILY MEDICINE

## 2024-08-26 PROCEDURE — 3078F DIAST BP <80 MM HG: CPT | Performed by: FAMILY MEDICINE

## 2024-08-26 PROCEDURE — 3061F NEG MICROALBUMINURIA REV: CPT | Performed by: FAMILY MEDICINE

## 2024-08-26 PROCEDURE — 3074F SYST BP LT 130 MM HG: CPT | Performed by: FAMILY MEDICINE

## 2024-08-26 PROCEDURE — 3008F BODY MASS INDEX DOCD: CPT | Performed by: FAMILY MEDICINE

## 2024-08-26 RX ORDER — NITROFURANTOIN 25; 75 MG/1; MG/1
100 CAPSULE ORAL 2 TIMES DAILY
Qty: 20 CAPSULE | Refills: 0 | Status: SHIPPED | OUTPATIENT
Start: 2024-08-26 | End: 2024-09-05

## 2024-08-26 NOTE — TELEPHONE ENCOUNTER
Patient called to schedule an appointment referencing 8/18 [employer] message entitled FYI.   Patient was offered appointments with Johana ESPINAL for a swab. Patient declined and said she thinks Dr. Hoyt would like to see her herself.  Advised patient Dr. Hoyt is booking into November. Patient would like to know if Dr. Hoyt wants her to see Johana ESPINAL. Please advise.

## 2024-08-26 NOTE — TELEPHONE ENCOUNTER
Patient aware of Dr. Hoyt's recommendation to come back into clinic to do another vaginal culture swab and to determine if her symptom is caused by candida. Patient can see NP or other provider, she's seen Dr. Dejesus before. Transferred to [facility] to schedule appt.  Patient verbalized understanding, agreed to and intend to comply with plan of care.

## 2024-08-28 NOTE — PROGRESS NOTES
Vero Michel is a 59 year old female.  HPI:   Patient presents with symptoms of UTI. Complaining of urinary frequency, urgency, dysuria, suprapubic pain for the past few days.  Also having yeast infection as well.  Denies back pain, fever, hematuria.    Current Outpatient Medications   Medication Sig Dispense Refill    nitrofurantoin monohydrate macro 100 MG Oral Cap Take 1 capsule (100 mg total) by mouth 2 (two) times daily for 10 days. 20 capsule 0    FARXIGA 10 MG Oral Tab TAKE ONE TABLET BY MOUTH ONE TIME DAILY 30 tablet 0    nortriptyline 25 MG Oral Cap Take 1 capsule (25 mg total) by mouth nightly. 90 capsule 3    lurasidone (LATUDA) 20 MG Oral Tab Take 1 tablet (20 mg total) by mouth daily with dinner. 30 tablet 3    lamoTRIgine 200 MG Oral Tab Take 1 tablet (200 mg total) by mouth 2 (two) times daily. 60 tablet 3    ALPRAZolam 1 MG Oral Tab Take 1 tablet (1 mg total) by mouth 2 (two) times daily as needed for Sleep or Anxiety. 60 tablet 3    ROSUVASTATIN 20 MG Oral Tab TAKE 1 TABLET BY MOUTH EVERY DAY AT NIGHT 90 tablet 0    ondansetron (ZOFRAN) 4 mg tablet TAKE 1 TABLET BY MOUTH TO BE TAKEN WITH THE FIRST DOSE OF MIGRAINE MEDICATION 30 tablet 2    Atogepant (QULIPTA) 30 MG Oral Tab TAKE 1 TABLET BY MOUTH DAILY (Patient taking differently: Take 60 mg by mouth daily. TAKE 1 TABLET BY MOUTH DAILY) 30 tablet 11    insulin aspart (NOVOLOG FLEXPEN) 100 Units/mL Subcutaneous Solution Pen-injector INJECT 3X/DAY WITH MEALS AS DIRECTED UP TO TOTAL DAILY DOSE = 20 UNITS 15 mL 0    Omeprazole 40 MG Oral Capsule Delayed Release Take 1 capsule (40 mg total) by mouth daily. 90 capsule 0    insulin degludec (TRESIBA FLEXTOUCH) 100 UNIT/ML Subcutaneous Solution Pen-injector INJECT DAILY AS DIRECTED UP TOTAL DAILY DOSE= 20 UNITS 15 mL 1    Insulin Lispro, 1 Unit Dial, 100 UNIT/ML Subcutaneous Solution Pen-injector INJECT 3X/DAY WITH MEALS AS DIRECTED UP TO TOTAL DAILY DOSE = 20 UNITS 15 mL 1    Insulin Pen Needle (BD PEN  NEEDLE ARCHANA U/F) 32G X 4 MM Does not apply Misc Inject 1 Pen into the skin 4 (four) times daily. Use to inject insulin 4x/day as directed 200 each 3    FREESTYLE TANIKA 3 SENSOR Does not apply Misc 1 each every 14 (fourteen) days. 2 each 11    estradiol 0.1 MG/GM Vaginal Cream 1 gram per vagina nightly for 2 weeks, then 1 gram per vagina three times per week for at least 10 weeks. May taper to 0.5 grams (pea-sized amount) nightly thereafter. 1 each 3    ZENPEP 04525-137467 units Oral Cap DR Particles TAKE 1 CAPSULE BY MOUTH 3 TIMES DAILY BEFORE MEALS. PLEASE ALSO TAKE 1 CAPSULE WITH LARGE SNACKS.      Eletriptan Hydrobromide 40 MG Oral Tab use at onset; may repeat once after 4 hours- ONLY 2 IN 24 HOUR PERIOD MAX.  This is a 30 day supply. 8 tablet 11    nortriptyline 10 MG Oral Cap Take 1 capsule (10 mg total) by mouth nightly. Take with 25mg at bedtime po for total of 35mg at bedtime po. 90 capsule 1    dicyclomine 10 MG Oral Cap Take 1 capsule (10 mg total) by mouth 3 (three) times daily as needed (abd cramping). 90 capsule 1    Microlet Lancets Does not apply Misc Test blood glucose twice daily 200 each 11    nystatin 100,000 Units/g External Cream ORDER IS FOR NYSTATIN SUPPOSITORY 100,000 UNITS IN VAGINA NIGHTLY FOR 30 DAYS - WILL FAX ORDER TO [facility] (Patient not taking: Reported on 8/26/2024) 30 each 0    FLUCYTOSINE Does not apply Powder 16% flucytosine cream, apply 5g nightly for 14 days (Patient not taking: Reported on 8/26/2024) 1 each 0    ketoconazole 2 % External Cream  (Patient not taking: Reported on 8/26/2024)      folic acid 400 MCG Oral Tab Take 1 tablet (400 mcg total) by mouth daily. (Patient not taking: Reported on 4/1/2024)      Fluticasone Propionate 50 MCG/ACT Nasal Suspension USE 1 SPRAY IN EACH NOSTRIL TWICE A DAY (Patient not taking: Reported on 8/26/2024)        Past Medical History:    Anxiety    Arthritis    Back pain    Constipation    Depression    Diabetes (HCC)     Diabetes mellitus (HCC)    Esophageal reflux    Fatigue    Flatulence/gas pain/belching    Frequent use of laxatives    Med complication    H/O degenerative disc disease    Headache disorder    Migraine    High cholesterol    History of depression    History of mental disorder    Hx of motion sickness    Hyperlipidemia    Hypertension    Impaired vision    Lichen sclerosus    Loss of appetite    Lumbar disc herniation    Migraines    Nausea    Periodic with migraines    Nausea and/or vomiting    Osteoarthritis    Pain in joints    Pain with bowel movements    Problems with swallowing    S/P hip replacement, left    Sleep apnea    Sleep disturbance    CPAP    Stress    Type 2 diabetes mellitus (HCC)    Uncomfortable fullness after meals    Migraines no appitite    Visual impairment    glasses for distance    Vomiting    Multiple times daily feb/march lessened and now none    Wears glasses    Weight loss      Social History:  Social History     Socioeconomic History    Marital status:     Number of children: 4   Tobacco Use    Smoking status: Never     Passive exposure: Never    Smokeless tobacco: Never   Vaping Use    Vaping status: Never Used   Substance and Sexual Activity    Alcohol use: Yes     Comment: 1 every couple months    Drug use: No   Other Topics Concern    Caffeine Concern No    Stress Concern Yes    Weight Concern No    Special Diet Yes    Exercise No    Seat Belt Yes     Social Determinants of Health     Financial Resource Strain: Low Risk  (7/18/2024)    Received from [facility]    Overall Financial Resource Strain (CARDIA)     Difficulty of Paying Living Expenses: Not hard at all   Food Insecurity: No Food Insecurity (7/18/2024)    Received from [facility]    Hunger Vital Sign     Worried About Running Out of Food in the Last Year: Never true     Ran Out of Food in the Last Year: Never true   Transportation Needs: No Transportation Needs (7/18/2024)     Received from [facility]    PRAPARE - Transportation     Lack of Transportation (Medical): No     Lack of Transportation (Non-Medical): No   Housing Stability: Low Risk  (7/18/2024)    Received from [facility]    Housing Stability Vital Sign     Unable to Pay for Housing in the Last Year: No     Number of Places Lived in the Last Year: 1     In the last 12 months, was there a time when you did not have a steady place to sleep or slept in a shelter (including now)?: No         REVIEW OF SYSTEMS:   GENERAL HEALTH: feels well otherwise  SKIN: denies any unusual skin lesions or rashes  RESPIRATORY: no shortness of breath with exertion  CARDIOVASCULAR: denies chest pain on exertion and palpitations.  GI: no nausea or vomiting  NEURO: denies headaches and dizziness.    EXAM:   /70   Pulse 84   Resp 16   Ht 5' 4\" (1.626 m)   Wt 151 lb (68.5 kg)   LMP 10/21/2012 (LMP Unknown)   BMI 25.92 kg/m²   GENERAL: well developed, well nourished,in no apparent distress  LUNG: Clear to auscultation bilaterally. No W/R/R.  HEART: RRR, no murmur.  SKIN: no rashes,no suspicious lesions  GI: good BS's,no masses, HSM; suprapubic tenderness, no CVAT    ASSESSMENT AND PLAN:   UTI. Plan is to start   Outpatient Encounter Medications as of 8/26/2024   Medication Sig Dispense Refill    nitrofurantoin monohydrate macro 100 MG Oral Cap Take 1 capsule (100 mg total) by mouth 2 (two) times daily for 10 days. 20 capsule 0    FARXIGA 10 MG Oral Tab TAKE ONE TABLET BY MOUTH ONE TIME DAILY 30 tablet 0    nortriptyline 25 MG Oral Cap Take 1 capsule (25 mg total) by mouth nightly. 90 capsule 3    lurasidone (LATUDA) 20 MG Oral Tab Take 1 tablet (20 mg total) by mouth daily with dinner. 30 tablet 3    lamoTRIgine 200 MG Oral Tab Take 1 tablet (200 mg total) by mouth 2 (two) times daily. 60 tablet 3    ALPRAZolam 1 MG Oral Tab Take 1 tablet (1 mg total) by mouth 2 (two) times daily as needed for  Sleep or Anxiety. 60 tablet 3    ROSUVASTATIN 20 MG Oral Tab TAKE 1 TABLET BY MOUTH EVERY DAY AT NIGHT 90 tablet 0    ondansetron (ZOFRAN) 4 mg tablet TAKE 1 TABLET BY MOUTH TO BE TAKEN WITH THE FIRST DOSE OF MIGRAINE MEDICATION 30 tablet 2    Atogepant (QULIPTA) 30 MG Oral Tab TAKE 1 TABLET BY MOUTH DAILY (Patient taking differently: Take 60 mg by mouth daily. TAKE 1 TABLET BY MOUTH DAILY) 30 tablet 11    insulin aspart (NOVOLOG FLEXPEN) 100 Units/mL Subcutaneous Solution Pen-injector INJECT 3X/DAY WITH MEALS AS DIRECTED UP TO TOTAL DAILY DOSE = 20 UNITS 15 mL 0    Omeprazole 40 MG Oral Capsule Delayed Release Take 1 capsule (40 mg total) by mouth daily. 90 capsule 0    insulin degludec (TRESIBA FLEXTOUCH) 100 UNIT/ML Subcutaneous Solution Pen-injector INJECT DAILY AS DIRECTED UP TOTAL DAILY DOSE= 20 UNITS 15 mL 1    Insulin Lispro, 1 Unit Dial, 100 UNIT/ML Subcutaneous Solution Pen-injector INJECT 3X/DAY WITH MEALS AS DIRECTED UP TO TOTAL DAILY DOSE = 20 UNITS 15 mL 1    Insulin Pen Needle (BD PEN NEEDLE ARCHANA U/F) 32G X 4 MM Does not apply Misc Inject 1 Pen into the skin 4 (four) times daily. Use to inject insulin 4x/day as directed 200 each 3    FREESTYLE TANIKA 3 SENSOR Does not apply Misc 1 each every 14 (fourteen) days. 2 each 11    estradiol 0.1 MG/GM Vaginal Cream 1 gram per vagina nightly for 2 weeks, then 1 gram per vagina three times per week for at least 10 weeks. May taper to 0.5 grams (pea-sized amount) nightly thereafter. 1 each 3    ZENPEP 28298-365638 units Oral Cap DR Particles TAKE 1 CAPSULE BY MOUTH 3 TIMES DAILY BEFORE MEALS. PLEASE ALSO TAKE 1 CAPSULE WITH LARGE SNACKS.      Eletriptan Hydrobromide 40 MG Oral Tab use at onset; may repeat once after 4 hours- ONLY 2 IN 24 HOUR PERIOD MAX.  This is a 30 day supply. 8 tablet 11    nortriptyline 10 MG Oral Cap Take 1 capsule (10 mg total) by mouth nightly. Take with 25mg at bedtime po for total of 35mg at bedtime po. 90 capsule 1    dicyclomine 10 MG  Oral Cap Take 1 capsule (10 mg total) by mouth 3 (three) times daily as needed (abd cramping). 90 capsule 1    Microlet Lancets Does not apply Misc Test blood glucose twice daily 200 each 11    nystatin 100,000 Units/g External Cream ORDER IS FOR NYSTATIN SUPPOSITORY 100,000 UNITS IN VAGINA NIGHTLY FOR 30 DAYS - WILL FAX ORDER TO [facility] (Patient not taking: Reported on 8/26/2024) 30 each 0    [DISCONTINUED] PEG 3350-KCl-Na Bicarb-NaCl 420 g Oral Recon Soln Take as directed by physician. 4000 mL 0    FLUCYTOSINE Does not apply Powder 16% flucytosine cream, apply 5g nightly for 14 days (Patient not taking: Reported on 8/26/2024) 1 each 0    ketoconazole 2 % External Cream  (Patient not taking: Reported on 8/26/2024)      folic acid 400 MCG Oral Tab Take 1 tablet (400 mcg total) by mouth daily. (Patient not taking: Reported on 4/1/2024)      Fluticasone Propionate 50 MCG/ACT Nasal Suspension USE 1 SPRAY IN EACH NOSTRIL TWICE A DAY (Patient not taking: Reported on 8/26/2024)       No facility-administered encounter medications on file as of 8/26/2024.    May take otc pyridium for discomfort if needed.    Take antibiotics with food.   Eat yogurt daily or use probiotics.    Discussed side effects and expected outcomes of medications.  Instructions given on increasing fluid intake, bladder emptying after intercourse.   The patient indicates understanding of these issues and agrees to the plan.  The patient is asked to return in 3 days if not better. Call if fever, vomiting, worsening symptoms.

## 2024-09-06 DIAGNOSIS — E11.22 TYPE 2 DIABETES MELLITUS WITH STAGE 3B CHRONIC KIDNEY DISEASE, WITHOUT LONG-TERM CURRENT USE OF INSULIN (HCC): ICD-10-CM

## 2024-09-06 DIAGNOSIS — N18.32 TYPE 2 DIABETES MELLITUS WITH STAGE 3B CHRONIC KIDNEY DISEASE, WITHOUT LONG-TERM CURRENT USE OF INSULIN (HCC): ICD-10-CM

## 2024-09-06 RX ORDER — DAPAGLIFLOZIN 10 MG/1
10 TABLET, FILM COATED ORAL DAILY
Qty: 30 TABLET | Refills: 0 | Status: SHIPPED | OUTPATIENT
Start: 2024-09-06

## 2024-09-06 NOTE — TELEPHONE ENCOUNTER
Requested Prescriptions     Pending Prescriptions Disp Refills    FARXIGA 10 MG Oral Tab [Pharmacy Med Name: Farxiga 10 Mg Tab Astr] 30 tablet 0     Sig: TAKE ONE TABLET BY MOUTH ONE TIME DAILY     Lab Results   Component Value Date    A1C 7.4 (H) 05/16/2024    A1C 8.5 (A) 01/29/2024    A1C 7.9 (A) 08/31/2023    A1C 6.2 (A) 02/06/2023    A1C 7.4 (H) 10/13/2022      Your Appointments      Wednesday October 02, 2024 4:45 PM  Video Visit with Eduar Moreno DO  [facility] ([facility] ) [address] [address] [phone]   Please verify your telehealth insurance benefits prior to your appointment.    You must be in the [facility] during the virtual visit.     Please use the [employer]sha and launch the video visit 10 minutes prior to your scheduled appointment time to ensure your camera and microphone are working properly. Once the video visit has started you will be placed in a waiting room until the provider begins the visit.     You will receive an email confirmation with instructions.  If you have questions, call your doctor's office directly.    If you are having issues or need to use a desktop/laptop, please follow the below steps:        1.       Close out all other open apps (could be competing for audio resources)  2.       Disable Bluetooth  3.       Reboot mobile device before joining the video  4.       Come off Wi-Fi and switch over to Data    Please see our Video Visit Tip Sheet if you need additional assistance.     If you believe this is an emergency, please dial 911 immediately.           Tuesday November 05, 2024 1:45 PM  Diabetes Pump follow up with KYLIE Bolden  [facility], [facility] ([facility]) [address]  [facility] [address] [phone]               Last OFFICE VISIT: 4---televisit     Last Refill:  7--30 tabs with 0  refills     Has follow up with Mecca Ann--- will send to  as she has seen her most recently

## 2024-09-09 ENCOUNTER — PATIENT MESSAGE (OUTPATIENT)
Dept: FAMILY MEDICINE CLINIC | Facility: CLINIC | Age: 59
End: 2024-09-09

## 2024-09-09 NOTE — TELEPHONE ENCOUNTER
I would like to see fasting am BS readings near 100 so she can adjust her tresiba by 2 units at a time to achieve this goal.

## 2024-09-29 DIAGNOSIS — N18.32 TYPE 2 DIABETES MELLITUS WITH STAGE 3B CHRONIC KIDNEY DISEASE, WITHOUT LONG-TERM CURRENT USE OF INSULIN (HCC): ICD-10-CM

## 2024-09-29 DIAGNOSIS — E11.22 TYPE 2 DIABETES MELLITUS WITH STAGE 3B CHRONIC KIDNEY DISEASE, WITHOUT LONG-TERM CURRENT USE OF INSULIN (HCC): ICD-10-CM

## 2024-09-30 RX ORDER — DAPAGLIFLOZIN 10 MG/1
10 TABLET, FILM COATED ORAL DAILY
Qty: 30 TABLET | Refills: 0 | Status: SHIPPED | OUTPATIENT
Start: 2024-09-30

## 2024-09-30 NOTE — TELEPHONE ENCOUNTER
Requested Prescriptions     Pending Prescriptions Disp Refills    FARXIGA 10 MG Oral Tab [Pharmacy Med Name: Farxiga 10 Mg Tab Astr] 30 tablet 0     Sig: TAKE ONE TABLET BY MOUTH ONE TIME DAILY     HGBA1C:    Lab Results   Component Value Date    A1C 7.4 (H) 05/16/2024    A1C 8.5 (A) 01/29/2024    A1C 7.9 (A) 08/31/2023     (H) 05/16/2024     Your Appointments      Wednesday October 02, 2024 4:45 PM  Video Visit with Eduar Moreno DO  [facility] ([facility] ) [address] [address] [phone]   Please verify your telehealth insurance benefits prior to your appointment.    You must be in the [facility] during the virtual visit.     Please use the [employer] Mobile Usha and launch the video visit 10 minutes prior to your scheduled appointment time to ensure your camera and microphone are working properly. Once the video visit has started you will be placed in a waiting room until the provider begins the visit.     You will receive an email confirmation with instructions.  If you have questions, call your doctor's office directly.    If you are having issues or need to use a desktop/laptop, please follow the below steps:        1.       Close out all other open apps (could be competing for audio resources)  2.       Disable Bluetooth  3.       Reboot mobile device before joining the video  4.       Come off Wi-Fi and switch over to Data    Please see our Video Visit Tip Sheet if you need additional assistance.     If you believe this is an emergency, please dial 911 immediately.           Tuesday November 05, 2024 1:45 PM  Diabetes Pump follow up with KYLIE Bolden  [facility], [facility] ([facility]) [address]  [facility] [id number] [phone]             [facility]    Last Refill:   9-6-2024-    Patient has upcoming appointment  with CB.

## 2024-10-03 ENCOUNTER — PATIENT MESSAGE (OUTPATIENT)
Dept: FAMILY MEDICINE CLINIC | Facility: CLINIC | Age: 59
End: 2024-10-03

## 2024-10-03 NOTE — TELEPHONE ENCOUNTER
If her fasting blood sugar is 150 in the mornings, would recommend increasing the Tresiba by 5 units and monitor.  Goal fasting blood sugar should be less than 120 in the morning.

## 2024-10-03 NOTE — TELEPHONE ENCOUNTER
From: Vero Michel  To: HEATHER SOLORZANO NICOLE  Sent: 10/3/2024 4:14 PM CDT  Subject: Higher glucose average    I am currently using the 10 units of tresiba daily. I believe my intake is a little better now that my anxiety is down. Also my psychiatrist increased my latuda to 40mg. My average BS is now 150. Do you want me to change anything? I see Mecca in November and don't mind waiting for that! (You agreed to help with my sugars until then so you are the preeti! )  Thanks for your direction,  Sarah

## 2024-10-16 ENCOUNTER — PATIENT MESSAGE (OUTPATIENT)
Dept: FAMILY MEDICINE CLINIC | Facility: CLINIC | Age: 59
End: 2024-10-16

## 2024-10-16 NOTE — TELEPHONE ENCOUNTER
Patient is overdue for annual physical.  Would you like patient to schedule and have the mentioned form brought in to be filled out at that time?

## 2024-10-17 NOTE — TELEPHONE ENCOUNTER
Patient called in:   Patient is requesting a sooner appt to get her form done with her physical.     does not have an appointment till Dec 27th.   Patient is not allowed to  the application until she gets the form done.

## 2024-10-18 ENCOUNTER — APPOINTMENT (OUTPATIENT)
Dept: URGENT CARE | Age: 59
End: 2024-10-18
Attending: EMERGENCY MEDICINE

## 2024-10-18 PROBLEM — N17.9 ACUTE KIDNEY INJURY (HCC): Status: RESOLVED | Noted: 2022-03-29 | Resolved: 2024-10-18

## 2024-10-18 PROBLEM — N17.9 ACUTE KIDNEY INJURY: Status: RESOLVED | Noted: 2022-03-29 | Resolved: 2024-10-18

## 2024-10-18 PROBLEM — N17.9 ACUTE RENAL FAILURE (ARF) (HCC): Status: RESOLVED | Noted: 2022-03-29 | Resolved: 2024-10-18

## 2024-10-18 PROBLEM — N17.9 ACUTE RENAL FAILURE (ARF): Status: RESOLVED | Noted: 2022-03-29 | Resolved: 2024-10-18

## 2024-10-24 ENCOUNTER — LAB ENCOUNTER (OUTPATIENT)
Dept: LAB | Age: 59
End: 2024-10-24
Attending: FAMILY MEDICINE
Payer: COMMERCIAL

## 2024-10-24 ENCOUNTER — OFFICE VISIT (OUTPATIENT)
Dept: FAMILY MEDICINE CLINIC | Facility: CLINIC | Age: 59
End: 2024-10-24
Payer: COMMERCIAL

## 2024-10-24 VITALS
TEMPERATURE: 97 F | HEIGHT: 64 IN | RESPIRATION RATE: 16 BRPM | DIASTOLIC BLOOD PRESSURE: 70 MMHG | HEART RATE: 88 BPM | BODY MASS INDEX: 25.61 KG/M2 | WEIGHT: 150 LBS | SYSTOLIC BLOOD PRESSURE: 110 MMHG

## 2024-10-24 DIAGNOSIS — Z11.1 SCREENING EXAMINATION FOR PULMONARY TUBERCULOSIS: Primary | ICD-10-CM

## 2024-10-24 DIAGNOSIS — Z12.31 ENCOUNTER FOR SCREENING MAMMOGRAM FOR MALIGNANT NEOPLASM OF BREAST: ICD-10-CM

## 2024-10-24 DIAGNOSIS — Z11.1 TUBERCULOSIS SCREENING: ICD-10-CM

## 2024-10-24 DIAGNOSIS — Z00.00 ANNUAL PHYSICAL EXAM: ICD-10-CM

## 2024-10-24 DIAGNOSIS — E78.2 MIXED HYPERLIPIDEMIA: ICD-10-CM

## 2024-10-24 DIAGNOSIS — E11.65 UNCONTROLLED TYPE 2 DIABETES MELLITUS WITH HYPERGLYCEMIA (HCC): ICD-10-CM

## 2024-10-24 DIAGNOSIS — Z00.00 ANNUAL PHYSICAL EXAM: Primary | ICD-10-CM

## 2024-10-24 DIAGNOSIS — F33.1 MAJOR DEPRESSIVE DISORDER, RECURRENT EPISODE, MODERATE (HCC): ICD-10-CM

## 2024-10-24 LAB
ALBUMIN SERPL-MCNC: 4.6 G/DL (ref 3.2–4.8)
ALBUMIN/GLOB SERPL: 1.6 {RATIO} (ref 1–2)
ALP LIVER SERPL-CCNC: 85 U/L
ALT SERPL-CCNC: 19 U/L
ANION GAP SERPL CALC-SCNC: 8 MMOL/L (ref 0–18)
AST SERPL-CCNC: 22 U/L (ref ?–34)
BASOPHILS # BLD AUTO: 0.04 X10(3) UL (ref 0–0.2)
BASOPHILS NFR BLD AUTO: 0.6 %
BILIRUB SERPL-MCNC: 0.5 MG/DL (ref 0.2–1.1)
BILIRUB UR QL STRIP.AUTO: NEGATIVE
BUN BLD-MCNC: 16 MG/DL (ref 9–23)
CALCIUM BLD-MCNC: 10.1 MG/DL (ref 8.7–10.4)
CHLORIDE SERPL-SCNC: 103 MMOL/L (ref 98–112)
CHOLEST SERPL-MCNC: 229 MG/DL (ref ?–200)
CLARITY UR REFRACT.AUTO: CLEAR
CO2 SERPL-SCNC: 27 MMOL/L (ref 21–32)
CREAT BLD-MCNC: 0.75 MG/DL
CREAT UR-SCNC: 43.2 MG/DL
EGFRCR SERPLBLD CKD-EPI 2021: 91 ML/MIN/1.73M2 (ref 60–?)
EOSINOPHIL # BLD AUTO: 0.13 X10(3) UL (ref 0–0.7)
EOSINOPHIL NFR BLD AUTO: 2.1 %
ERYTHROCYTE [DISTWIDTH] IN BLOOD BY AUTOMATED COUNT: 13.7 %
EST. AVERAGE GLUCOSE BLD GHB EST-MCNC: 148 MG/DL (ref 68–126)
FASTING PATIENT LIPID ANSWER: NO
FASTING STATUS PATIENT QL REPORTED: NO
GLOBULIN PLAS-MCNC: 2.9 G/DL (ref 2–3.5)
GLUCOSE BLD-MCNC: 151 MG/DL (ref 70–99)
GLUCOSE UR STRIP.AUTO-MCNC: >1000 MG/DL
HBA1C MFR BLD: 6.8 % (ref ?–5.7)
HCT VFR BLD AUTO: 48 %
HDLC SERPL-MCNC: 32 MG/DL (ref 40–59)
HGB BLD-MCNC: 15.8 G/DL
IMM GRANULOCYTES # BLD AUTO: 0.04 X10(3) UL (ref 0–1)
IMM GRANULOCYTES NFR BLD: 0.6 %
KETONES UR STRIP.AUTO-MCNC: NEGATIVE MG/DL
LDLC SERPL CALC-MCNC: 157 MG/DL (ref ?–100)
LEUKOCYTE ESTERASE UR QL STRIP.AUTO: NEGATIVE
LYMPHOCYTES # BLD AUTO: 3.13 X10(3) UL (ref 1–4)
LYMPHOCYTES NFR BLD AUTO: 49.6 %
MCH RBC QN AUTO: 29.8 PG (ref 26–34)
MCHC RBC AUTO-ENTMCNC: 32.9 G/DL (ref 31–37)
MCV RBC AUTO: 90.6 FL
MICROALBUMIN UR-MCNC: 0.4 MG/DL
MICROALBUMIN/CREAT 24H UR-RTO: 9.3 UG/MG (ref ?–30)
MONOCYTES # BLD AUTO: 0.58 X10(3) UL (ref 0.1–1)
MONOCYTES NFR BLD AUTO: 9.2 %
NEUTROPHILS # BLD AUTO: 2.39 X10 (3) UL (ref 1.5–7.7)
NEUTROPHILS # BLD AUTO: 2.39 X10(3) UL (ref 1.5–7.7)
NEUTROPHILS NFR BLD AUTO: 37.9 %
NITRITE UR QL STRIP.AUTO: NEGATIVE
NONHDLC SERPL-MCNC: 197 MG/DL (ref ?–130)
OSMOLALITY SERPL CALC.SUM OF ELEC: 290 MOSM/KG (ref 275–295)
PH UR STRIP.AUTO: 5.5 [PH] (ref 5–8)
PLATELET # BLD AUTO: 204 10(3)UL (ref 150–450)
POTASSIUM SERPL-SCNC: 4.3 MMOL/L (ref 3.5–5.1)
PROT SERPL-MCNC: 7.5 G/DL (ref 5.7–8.2)
PROT UR STRIP.AUTO-MCNC: NEGATIVE MG/DL
RBC # BLD AUTO: 5.3 X10(6)UL
RBC UR QL AUTO: NEGATIVE
SODIUM SERPL-SCNC: 138 MMOL/L (ref 136–145)
SP GR UR STRIP.AUTO: >1.03 (ref 1–1.03)
TRIGL SERPL-MCNC: 215 MG/DL (ref 30–149)
TSI SER-ACNC: 0.77 MIU/ML (ref 0.55–4.78)
UROBILINOGEN UR STRIP.AUTO-MCNC: NORMAL MG/DL
VLDLC SERPL CALC-MCNC: 42 MG/DL (ref 0–30)
WBC # BLD AUTO: 6.3 X10(3) UL (ref 4–11)

## 2024-10-24 PROCEDURE — 81003 URINALYSIS AUTO W/O SCOPE: CPT | Performed by: FAMILY MEDICINE

## 2024-10-24 PROCEDURE — 86480 TB TEST CELL IMMUN MEASURE: CPT | Performed by: FAMILY MEDICINE

## 2024-10-24 PROCEDURE — 80061 LIPID PANEL: CPT | Performed by: FAMILY MEDICINE

## 2024-10-24 PROCEDURE — 80050 GENERAL HEALTH PANEL: CPT | Performed by: FAMILY MEDICINE

## 2024-10-24 PROCEDURE — 82043 UR ALBUMIN QUANTITATIVE: CPT | Performed by: FAMILY MEDICINE

## 2024-10-24 PROCEDURE — 82570 ASSAY OF URINE CREATININE: CPT | Performed by: FAMILY MEDICINE

## 2024-10-24 PROCEDURE — 83036 HEMOGLOBIN GLYCOSYLATED A1C: CPT | Performed by: FAMILY MEDICINE

## 2024-10-24 RX ORDER — ROSUVASTATIN CALCIUM 20 MG/1
20 TABLET, COATED ORAL NIGHTLY
Qty: 90 TABLET | Refills: 1 | Status: SHIPPED | OUTPATIENT
Start: 2024-10-24

## 2024-10-26 LAB
M TB IFN-G CD4+ T-CELLS BLD-ACNC: 0.06 IU/ML
M TB TUBERC IFN-G BLD QL: NEGATIVE
M TB TUBERC IGNF/MITOGEN IGNF CONTROL: >10 IU/ML
QFT TB1 AG MINUS NIL: 0 IU/ML
QFT TB2 AG MINUS NIL: 0.01 IU/ML

## 2024-10-26 NOTE — PROGRESS NOTES
CC: Annual Physical Exam    HPI:   Vero Michel is a 59 year old female who presents for a complete physical exam. Symptoms: denies discharge, itching, burning or dysuria, is menopausal. Patient complains of nothing today.  Due for repeat mammogram  Colonoscopy up to date.  DM2 managed by endocrine.   Seeing psychiatry for depression.  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  She has not been diagnosed with Tex's disease at this point.    Wt Readings from Last 6 Encounters:   10/24/24 150 lb (68 kg)   08/26/24 151 lb (68.5 kg)   05/15/24 153 lb (69.4 kg)   04/05/24 150 lb 3.2 oz (68.1 kg)   04/02/24 149 lb (67.6 kg)   04/01/24 151 lb (68.5 kg)     Body mass index is 25.75 kg/m².     Results for orders placed or performed in visit on 08/26/24   URINALYSIS, AUTO, W/O SCOPE    Collection Time: 08/26/24  6:01 PM   Result Value Ref Range    Glucose Urine >=1000 (A) Negative mg/dL    Bilirubin Urine Negative Negative    Ketones, UA Negative Negative - Trace mg/dL    Spec Gravity 1.015 1.005 - 1.030    Blood Urine Trace-intact (A) Negative    PH Urine 5.5 5.0 - 8.0    Protein Urine Negative Negative - Trace mg/dL    Urobilinogen Urine 0.2 0.2 - 1.0 mg/dL    Nitrite Urine Negative Negative    Leukocyte Esterase Urine Negative Negative    APPEARANCE clear Clear    Color Urine yellow Yellow    Multistix Lot# [id number] Numeric    Multistix Expiration Date 6-30-25 Date   Urine Culture, Routine [E]    Collection Time: 08/26/24  6:02 PM    Specimen: Urine, clean catch   Result Value Ref Range    Urine Culture No Growth at 18-24 hrs.        Current Outpatient Medications   Medication Sig Dispense Refill    rosuvastatin 20 MG Oral Tab Take 1 tablet (20 mg total) by mouth nightly. 90 tablet 1    lurasidone 40 MG Oral Tab Take 1 tablet (40 mg total) by mouth daily with dinner. 30 tablet 0    FARXIGA 10 MG Oral Tab TAKE ONE TABLET BY MOUTH ONE TIME DAILY 30 tablet 0    ketoconazole 2 % External Cream  Apply 1 Application topically daily as needed. If symptoms can apply to affected area daily for 2-3 weeks at a time. If not improving after 2-3 weeks please contact physician 30 g 2    Glucose Blood (CONTOUR NEXT TEST) In Vitro Strip Use as directed 100 each 2    nortriptyline 25 MG Oral Cap Take 1 capsule (25 mg total) by mouth nightly. 90 capsule 3    lamoTRIgine 200 MG Oral Tab Take 1 tablet (200 mg total) by mouth 2 (two) times daily. 60 tablet 3    ALPRAZolam 1 MG Oral Tab Take 1 tablet (1 mg total) by mouth 2 (two) times daily as needed for Sleep or Anxiety. 60 tablet 3    ondansetron (ZOFRAN) 4 mg tablet TAKE 1 TABLET BY MOUTH TO BE TAKEN WITH THE FIRST DOSE OF MIGRAINE MEDICATION 30 tablet 2    Atogepant (QULIPTA) 30 MG Oral Tab TAKE 1 TABLET BY MOUTH DAILY (Patient taking differently: 60 mg. TAKE 1 TABLET BY MOUTH DAILY) 30 tablet 11    nystatin 100,000 Units/g External Cream ORDER IS FOR NYSTATIN SUPPOSITORY 100,000 UNITS IN VAGINA NIGHTLY FOR 30 DAYS - WILL FAX ORDER TO [facility] 30 each 0    insulin aspart (NOVOLOG FLEXPEN) 100 Units/mL Subcutaneous Solution Pen-injector INJECT 3X/DAY WITH MEALS AS DIRECTED UP TO TOTAL DAILY DOSE = 20 UNITS 15 mL 0    Omeprazole 40 MG Oral Capsule Delayed Release Take 1 capsule (40 mg total) by mouth daily. 90 capsule 0    insulin degludec (TRESIBA FLEXTOUCH) 100 UNIT/ML Subcutaneous Solution Pen-injector INJECT DAILY AS DIRECTED UP TOTAL DAILY DOSE= 20 UNITS 15 mL 1    Insulin Lispro, 1 Unit Dial, 100 UNIT/ML Subcutaneous Solution Pen-injector INJECT 3X/DAY WITH MEALS AS DIRECTED UP TO TOTAL DAILY DOSE = 20 UNITS 15 mL 1    Insulin Pen Needle (BD PEN NEEDLE ARCHANA U/F) 32G X 4 MM Does not apply Misc Inject 1 Pen into the skin 4 (four) times daily. Use to inject insulin 4x/day as directed 200 each 3    FREESTYLE TANIKA 3 SENSOR Does not apply Misc 1 each every 14 (fourteen) days. 2 each 11    estradiol 0.1 MG/GM Vaginal Cream 1 gram per vagina nightly for 2 weeks,  then 1 gram per vagina three times per week for at least 10 weeks. May taper to 0.5 grams (pea-sized amount) nightly thereafter. 1 each 3    ZENPEP 45654-708681 units Oral Cap DR Particles TAKE 1 CAPSULE BY MOUTH 3 TIMES DAILY BEFORE MEALS. PLEASE ALSO TAKE 1 CAPSULE WITH LARGE SNACKS.      Eletriptan Hydrobromide 40 MG Oral Tab use at onset; may repeat once after 4 hours- ONLY 2 IN 24 HOUR PERIOD MAX.  This is a 30 day supply. 8 tablet 11    nortriptyline 10 MG Oral Cap Take 1 capsule (10 mg total) by mouth nightly. Take with 25mg at bedtime po for total of 35mg at bedtime po. 90 capsule 1    dicyclomine 10 MG Oral Cap Take 1 capsule (10 mg total) by mouth 3 (three) times daily as needed (abd cramping). 90 capsule 1    Microlet Lancets Does not apply Misc Test blood glucose twice daily 200 each 11    lurasidone (LATUDA) 20 MG Oral Tab Take 1 tablet (20 mg total) by mouth daily with dinner. (Patient not taking: Reported on 10/24/2024) 30 tablet 3    FLUCYTOSINE Does not apply Powder 16% flucytosine cream, apply 5g nightly for 14 days (Patient not taking: Reported on 10/24/2024) 1 each 0    Fluticasone Propionate 50 MCG/ACT Nasal Suspension USE 1 SPRAY IN EACH NOSTRIL TWICE A DAY (Patient not taking: Reported on 10/24/2024)        Allergies   Allergen Reactions    Topiramate CONFUSION      Past Medical History:    Anxiety    Arthritis    Back pain    Constipation    Depression    Diabetes (HCC)    Diabetes mellitus (HCC)    Esophageal reflux    Fatigue    Flatulence/gas pain/belching    Frequent use of laxatives    Med complication    H/O degenerative disc disease    Headache disorder    Migraine    High cholesterol    History of depression    History of mental disorder    Hx of motion sickness    Hyperlipidemia    Hypertension    Impaired vision    Lichen sclerosus    Loss of appetite    Lumbar disc herniation    Migraines    Nausea    Periodic with migraines    Nausea and/or vomiting    Osteoarthritis    Pain in  joints    Pain with bowel movements    Problems with swallowing    S/P hip replacement, left    Sleep apnea    Sleep disturbance    CPAP    Stress    Type 2 diabetes mellitus (HCC)    Uncomfortable fullness after meals    Migraines no appitite    Visual impairment    glasses for distance    Vomiting    Multiple times daily feb/march lessened and now none    Wears glasses    Weight loss      Past Surgical History:   Procedure Laterality Date    Biopsy  02/2024    Labia    Colonoscopy  01/01/2007    Evaluate only, bladder (dmg)  2007    Hip replacement surgery  2019    Hip surgery Left 10/21/2019    THR    Other surgical history      SLING (Bladder)    Other surgical history  2010    bladder sling at [facility],    Tonsillectomy        Family History   Problem Relation Age of Onset    Prostate Cancer Father     Cancer Father 73        prostate    Hypertension Mother     Psychiatric Mother     Migraines Daughter     Diabetes Maternal Grandfather     Breast Cancer Paternal Grandmother 50    Hypertension Sister     Lipids Other     Hypertension Other       Social History:   Social History     Socioeconomic History    Marital status:     Number of children: 4   Tobacco Use    Smoking status: Never     Passive exposure: Never    Smokeless tobacco: Never   Vaping Use    Vaping status: Never Used   Substance and Sexual Activity    Alcohol use: Yes     Comment: 1 every couple months    Drug use: No   Other Topics Concern    Caffeine Concern No    Stress Concern Yes    Weight Concern No    Special Diet Yes    Exercise No    Seat Belt Yes     Social Drivers of Health     Financial Resource Strain: Low Risk  (7/18/2024)    Received from [facility]    Overall Financial Resource Strain (CARDIA)     Difficulty of Paying Living Expenses: Not hard at all   Food Insecurity: No Food Insecurity (7/18/2024)    Received from [facility]    Hunger Vital Sign     Worried About Running Out of  Food in the Last Year: Never true     Ran Out of Food in the Last Year: Never true   Transportation Needs: No Transportation Needs (7/18/2024)    Received from [facility]    PRAPARE - Transportation     Lack of Transportation (Medical): No     Lack of Transportation (Non-Medical): No   Housing Stability: Low Risk  (7/18/2024)    Received from [facility]    Housing Stability Vital Sign     Unable to Pay for Housing in the Last Year: No     Number of Places Lived in the Last Year: 1     Unstable Housing in the Last Year: No     Job: [employer].  : yes. Children: yes.   Exercise: minimal.  Diet: watches fats closely and watches calories closely     REVIEW OF SYSTEMS:   GENERAL: feels well otherwise  SKIN: denies any unusual skin lesions  EYES:denies blurred vision or double vision  HEENT: denies nasal congestion, sinus pain or ST   LUNGS: denies shortness of breath with exertion, denies cough  CARDIOVASCULAR: denies chest pain on exertion or at rest, denies palpitations  GI: denies abdominal pain,denies heartburn, denies n/v/d/c/blood in stool  : denies dysuria, vaginal discharge or itching, in menopause   MUSCULOSKELETAL: denies back pain  NEURO: + migraine headaches, denies LH/dizziness/syncope  PSYCHE: + depression and anxiety  HEMATOLOGIC: denies hx of anemia  ENDOCRINE: denies thyroid history  ALL/ASTHMA: denies hx of allergy or asthma    EXAM:   /70   Pulse 88   Temp 97.2 °F (36.2 °C) (Temporal)   Resp 16   Ht 5' 4\" (1.626 m)   Wt 150 lb (68 kg)   LMP 10/21/2012 (LMP Unknown)   BMI 25.75 kg/m²   Body mass index is 25.75 kg/m².   GENERAL: well developed, well nourished,in no apparent distress  SKIN: no rashes,no suspicious lesions  HEENT: atraumatic, normocephalic,ears and throat are clear  EYES:PERRLA, EOMI, conjunctiva are clear  NECK: supple,no adenopathy, no thyromegaly  BREAST: Deferred  LUNGS: clear to auscultation, no r/r/w  CARDIO: RRR  without murmur  GI: good BS's,no masses, HSM or tenderness  : Deferred   MUSCULOSKELETAL: back is not tender,FROM of the back  EXTREMITIES: no cyanosis, clubbing or edema  NEURO: Oriented times three,cranial nerves are intact,motor and sensory are grossly intact, 2 + knee reflexes bilaterally  VASCULAR: 2 + dorsalis pedal pulses bilaterally    ASSESSMENT AND PLAN:   Vero Michel is a 59 year old female who presents for a complete physical exam.  Pap and pelvic up to date.   Mammogram ordered  Health maintenance, will check:   Orders Placed This Encounter   Procedures    CBC With Differential With Platelet    Comp Metabolic Panel (14)    Lipid Panel    Urinalysis, Routine    TSH W Reflex To Free T4    Hemoglobin A1C Now    Microalb/Creat Ratio, Random Urine Now    Diabetic Retinopathy Exam  OU - Both Eyes       Advised to have repeat ultrafast, and 5 minute heart aware.  Advised patient to check with insurance company for coverage prior to doing the test.     Colonoscopy up to date.    DM2 - managed by endocrine, cpm, A1c better controlled at 6.8    HL - recheck lipid panel, continue rosuvastatin    Depression - stable, cpm, managed by psychiatry    TB screening - check TB quantiferon    Tdap and prevnar 20 up to date    Pt' s weight is Body mass index is 25.75 kg/m²., recommended low fat diet and aerobic exercise 30 minutes three times weekly.      The patient indicates understanding of these issues and agrees to the plan.    The patient is asked to return in 6 months .

## 2024-10-28 NOTE — TELEPHONE ENCOUNTER
Reports still problem w/ nausea/vomiting    Continues to follow up with neurology  Continues to work    Labs reviewed w/ pateint    F/u labs on coming Thursday - 10/7 cardiology consult for Afib, patient converted back to SR. if AF is persistent will need AC  - 10/25 ECG and exam notable for irregular rate and rhythm  - Increase Metoprolol to 20 BID for rate control  - Continue to monitor on telemetry  - c/w heparin gtt 3500mg IV q6 for anticoagulation  - Consulted cardiology, rec to replete electrolytes and treat sepsis before addressing Afib, f/u reccs  - Consulted outpt cardiology Dr. Copeland, f/u reccs. Multiple messages left for Dr. Copeland

## 2024-10-30 DIAGNOSIS — E11.22 TYPE 2 DIABETES MELLITUS WITH STAGE 3B CHRONIC KIDNEY DISEASE, WITHOUT LONG-TERM CURRENT USE OF INSULIN (HCC): ICD-10-CM

## 2024-10-30 DIAGNOSIS — N18.32 TYPE 2 DIABETES MELLITUS WITH STAGE 3B CHRONIC KIDNEY DISEASE, WITHOUT LONG-TERM CURRENT USE OF INSULIN (HCC): ICD-10-CM

## 2024-10-30 NOTE — TELEPHONE ENCOUNTER
Requested Prescriptions     Pending Prescriptions Disp Refills    FARXIGA 10 MG Oral Tab [Pharmacy Med Name: Farxiga 10 Mg Tab Astr] 30 tablet 0     Sig: TAKE ONE TABLET BY MOUTH ONE TIME DAILY     Future Appointments   Date Time Provider Department Center   11/5/2024  1:45 PM Mecca Ann APRN [id number] [place]   11/6/2024  4:30 PM Eduar Moreno DO [facility] [place]   1/14/2025 11:00 AM Benoit Ruiz MD [facility]     Last A1c value was 6.8% done 10/24/2024.  Refill 9/30/2024 Yimi   LOV 8/6/24 Yimi

## 2024-10-31 RX ORDER — DAPAGLIFLOZIN 10 MG/1
10 TABLET, FILM COATED ORAL DAILY
Qty: 30 TABLET | Refills: 0 | Status: SHIPPED | OUTPATIENT
Start: 2024-10-31

## 2024-11-01 ENCOUNTER — MED REC SCAN ONLY (OUTPATIENT)
Dept: FAMILY MEDICINE CLINIC | Facility: CLINIC | Age: 59
End: 2024-11-01

## 2024-11-05 ENCOUNTER — OFFICE VISIT (OUTPATIENT)
Dept: ENDOCRINOLOGY CLINIC | Facility: CLINIC | Age: 59
End: 2024-11-05
Payer: COMMERCIAL

## 2024-11-05 ENCOUNTER — TELEPHONE (OUTPATIENT)
Dept: ENDOCRINOLOGY CLINIC | Facility: CLINIC | Age: 59
End: 2024-11-05

## 2024-11-05 VITALS
BODY MASS INDEX: 26 KG/M2 | OXYGEN SATURATION: 95 % | SYSTOLIC BLOOD PRESSURE: 108 MMHG | DIASTOLIC BLOOD PRESSURE: 70 MMHG | WEIGHT: 152.38 LBS | HEART RATE: 85 BPM

## 2024-11-05 DIAGNOSIS — E11.65 TYPE 2 DIABETES MELLITUS WITH HYPERGLYCEMIA, WITH LONG-TERM CURRENT USE OF INSULIN (HCC): Primary | ICD-10-CM

## 2024-11-05 DIAGNOSIS — Z79.4 TYPE 2 DIABETES MELLITUS WITH HYPERGLYCEMIA, WITH LONG-TERM CURRENT USE OF INSULIN (HCC): Primary | ICD-10-CM

## 2024-11-05 DIAGNOSIS — E78.2 MIXED HYPERLIPIDEMIA: ICD-10-CM

## 2024-11-05 PROBLEM — R13.10 DYSPHAGIA: Status: RESOLVED | Noted: 2023-08-07 | Resolved: 2024-11-05

## 2024-11-05 PROBLEM — R11.2 NAUSEA AND/OR VOMITING: Status: RESOLVED | Noted: 2023-05-02 | Resolved: 2024-11-05

## 2024-11-05 PROBLEM — E87.6 HYPOKALEMIA: Status: RESOLVED | Noted: 2022-03-29 | Resolved: 2024-11-05

## 2024-11-05 PROBLEM — E66.9 OBESITY: Status: RESOLVED | Noted: 2019-10-21 | Resolved: 2024-11-05

## 2024-11-05 PROBLEM — K59.00 CONSTIPATION: Status: RESOLVED | Noted: 2022-06-28 | Resolved: 2024-11-05

## 2024-11-05 PROBLEM — R63.4 LOSS OF WEIGHT: Status: RESOLVED | Noted: 2023-05-02 | Resolved: 2024-11-05

## 2024-11-05 PROBLEM — R19.4 CHANGE IN BOWEL HABITS: Status: RESOLVED | Noted: 2023-05-02 | Resolved: 2024-11-05

## 2024-11-05 PROBLEM — K22.9 IRREGULAR Z LINE OF ESOPHAGUS: Status: RESOLVED | Noted: 2023-05-02 | Resolved: 2024-11-05

## 2024-11-05 PROBLEM — R73.9 HYPERGLYCEMIA: Status: RESOLVED | Noted: 2022-03-29 | Resolved: 2024-11-05

## 2024-11-05 PROBLEM — R11.0 NAUSEA: Status: RESOLVED | Noted: 2022-06-28 | Resolved: 2024-11-05

## 2024-11-05 PROBLEM — D12.2 BENIGN NEOPLASM OF ASCENDING COLON: Status: RESOLVED | Noted: 2023-05-02 | Resolved: 2024-11-05

## 2024-11-05 PROCEDURE — 95251 CONT GLUC MNTR ANALYSIS I&R: CPT | Performed by: NURSE PRACTITIONER

## 2024-11-05 PROCEDURE — 99215 OFFICE O/P EST HI 40 MIN: CPT | Performed by: NURSE PRACTITIONER

## 2024-11-05 PROCEDURE — 3078F DIAST BP <80 MM HG: CPT | Performed by: NURSE PRACTITIONER

## 2024-11-05 PROCEDURE — 3074F SYST BP LT 130 MM HG: CPT | Performed by: NURSE PRACTITIONER

## 2024-11-05 RX ORDER — HYDROCHLOROTHIAZIDE 12.5 MG/1
1 CAPSULE ORAL
Qty: 2 EACH | Refills: 0 | Status: SHIPPED | OUTPATIENT
Start: 2024-11-05

## 2024-11-05 RX ORDER — ROSUVASTATIN CALCIUM 20 MG/1
20 TABLET, COATED ORAL NIGHTLY
Qty: 90 TABLET | Refills: 3 | Status: SHIPPED | OUTPATIENT
Start: 2024-11-05

## 2024-11-05 RX ORDER — SEMAGLUTIDE 0.68 MG/ML
INJECTION, SOLUTION SUBCUTANEOUS
Qty: 3 ML | Refills: 0 | COMMUNITY
Start: 2024-11-05

## 2024-11-05 NOTE — PATIENT INSTRUCTIONS
Continue     Farxiga 10mg once daily     Decrease Tresiba 10 units injection daily    NOVOLOG  sliding scale: three times daily at meals    180-220 mg/dl = 2 units  220-260 mg/dl = 3 units  261-300 mg/dl = 4 units  Over  301mg/dl = 5 units          Ozempic Is considered a Non insulin injectables (also called a \"gut hormone\" or GLP-1 receptor agonists).     This hormone is very good for diabetes and improving blood sugars without a risk of low blood sugars. It also has properties that is good for protecting the heart, brain and kidneys.     T    The  medicine  works in the following ways:   1.            By helping beta cells release more insulin when there is food in the stomach and intestines. The increased insulin lowers blood sugars.  2.            By stopping the liver from releasing sugar in to the blood when it is NOT needed.  3.            By slowing the movement of food through the stomach so that glucose (or sugar) enters the blood more slowly   4.            By making you feel full which helps decrease the amount of food that you eat.     Common side effects:   Nausea or diarrhea   These effects usually go away over time as your body gets used to the medicine     Here are some things that might help your nausea go away:     Eat small amounts of food instead of  large meals  Eat plain, bland non greasy foods such as bread, crackers and rice , non seasoned lean proteins such as chicken or fish   Drink plenty of fluids (sugar free)   Avoid foods and smells that might make you sick   Avoid greasy fried or sweet foods.   Avoid lying down after you eat. Wait at least 2 hours   Eat slowly and listen to your hunger       Fluctuations in blood sugars are best detected by testing your sugar with your meter.   In order for me to determine any patterns in your blood sugars, you will need to test your blood sugar 1- 2 times daily     It would be best to change up the times of day that you are testing your sugar.    Always test before breakfast (fasting) and then alternate testing blood sugar 1-2 hours after your meals.     American Diabetes Association: blood sugar targets:     Fasting blood sugar (before breakfast) Target:    (ideally less than 110)  2 hours after eating less than 180 (ideally less than  150 )     Call for blood sugars less than  75 or greater than  200 more than 2 times in a week     If you are wearing the sensor, please look at your trends/averages    Recommendations:   GMI (estimated A1C ) target under  7%     Time in range (healthy blood sugar targets) : goal is over 70%   less than 70 : goal is less than 4%   Over 180 : goal is less than 20 %   Over 250: goal is  less than 5%       Watch for low blood sugars: (less than 70 )    Treatment of Low Blood Glucose Action Plan  1. Check blood glucose to be sure that it is low. You cannot  always go by symptoms or how you feel. If in doubt, treat your low blood glucose anyway.  Rule of 15 :     2. Take 15 grams of carbohydrate (carb). Here are some choices:    4 oz. regular fruit juice  3-4 glucose tablets  6 oz. regular soda   7-8 jelly beans    3. Recheck blood glucose after 10-15 minutes. If blood glucose is still low (less than 70 mg/dl) repeat the treatment (step 2).    4. If your next meal is more than one hour away, eat a small snack.    5. If you’re not sure what caused your low blood glucose, call your healthcare provider.    6. Always check your blood glucose before you drive       To treat a low, I recommend you carry with you easy, pre-portioned treatment for low blood sugars that are 15G of carbs:   - Children sized squeeze pouch applesauce (high fiber + carbs help prevent too high of a spike)  - Small children's sized juicebox- 15g carb --> 4oz juice box  - Glucose tablets from [employer], you can find them near diabetes supplies --> Note, you will need to eat 3-4 tablets to get to 15g of carbs  - Children sized fruit snack pack- look  for one with 15 grams of total carbohydrate    AVOID complex carbs, or foods that contain fats along with carbs (like chocolate) can slow the absorption of glucose and should NOT be used to treat an emergency low

## 2024-11-05 NOTE — PROGRESS NOTES
Name: Vero Michel  YOB: 1965  Report Period: 10/09/2024 - 11/05/2024 (28 days)  Generated: 11/05/2024  % Time CGM Active: 97%      Glucose Statistics and Targets  Average Glucose: 152 mg/dL  Glucose Management Indicator (GMI): 6.9%  Glucose Variability (%CV): 20.6%  Target Range: 70 - 180 mg/dL      Time in Ranges  Very High: >250 mg/dL --- 0%  High: 181 - 250 mg/dL --- 18%  Target Range: 70 - 180 mg/dL --- 82%  Low: 54 - 69 mg/dL --- 0%  Very Low: <54 mg/dL --- 0%

## 2024-11-05 NOTE — PROGRESS NOTES
Vero Michel is a 59 year old presenting today to establish w me  for type 2 diabetes management.   Primary care physician: HEATHER RIVERA,   Transferring care from ALISON ESPINAL --> last Diabetes appointment 4-2024   Most recent  A1C 6.8 ( last A1C  7.4% )        Wearing ale 3 CGM -  Reviewed CGM report/trends w patient today:  Ale  (full download in media)   Sensor active time: 97 %    4 week (28-30 days)  GMI 6.9 %    Average glucose: 152 mg/dl     Hypoglycemia 0%    TIR 82 %  (ADA recommended goal > 70%)   Variability WNL ( < 33%)      vaginal yeast infections: 8 m straight (Jan thru 8 -2024)  but past 2 m No active symptoms   Did not think of Farxiga. Has eliminated refined sugars out of diet       PMH updates:   tested positive for gene related to Hinsdale's disease. She has not been diagnosed with Hinsdale's disease at this point. Dr Behzad, (mother diagnosed 2023)   Muscle problems, balance issues     Reports 15 migraines per month; Recent latuda rx but stopped due to side effects   Followed by [facility]; may be starting new rx   May be starting Vyepti 300 mg q 1 months -->         Diabetes History:  Type 2 Diabetes ~ 2008    Patient has not had hospitalizations for blood sugar issues  denies any history of pancreatitis      Previous DM therapies:  glimepiride, Januvia: change in rx   Jardiance - mycotic infection   Pioglitazone :     Metformin + loss of appetite   Trulicity  lack of control     Current DM Regimen:  Farxiga 10mg once daily     Tresiba 15 units injection daily,   NOVOLOG flex pen:  sliding scale: three times daily at meals    150-200 mg/dl = 2 units   201-250 mg/dl = 4 units   251-300 mg/dl = 6 units   301-350 mg/dl = 8 units  Over 350 mg/dl = 10 units       HGBA1C:    Lab Results   Component Value Date    A1C 6.8 (H) 10/24/2024    A1C 7.4 (H) 05/16/2024    A1C 8.5 (A) 01/29/2024     (H) 10/24/2024       Lab Results   Component Value Date    CHOLEST 229 (H)  10/24/2024    CHOLEST 138 05/16/2024    TRIG 215 (H) 10/24/2024    TRIG 64 05/16/2024    HDL 32 (L) 10/24/2024    HDL 42 05/16/2024     (H) 10/24/2024    LDL 83 05/16/2024     Lab Results   Component Value Date    MICROALBCREA 9.3 10/24/2024    MICROALBCREA 9.8 05/16/2024      Lab Results   Component Value Date    CREATSERUM 0.75 10/24/2024    CREATSERUM 1.01 05/16/2024    EGFRCR 91 10/24/2024    EGFRCR 64 05/16/2024     Lab Results   Component Value Date    AST 22 10/24/2024    AST 32 05/16/2024    ALT 19 10/24/2024    ALT 36 05/16/2024       Lab Results   Component Value Date    TSH 0.770 10/24/2024    TSH 1.030 09/25/2023    T4F 0.8 (L) 07/30/2018         DM Complications:  Microvascular:   Neuropathy: no  Retinopathy: no  Nephropathy: no    Macrovascular:  PVD: no  CAD: no  Stroke/CVA: no        Modifying factors:  Medication adherence: yes   Barriers: none    Recent steroids, illness or infections ( past 3m): no     Allergies: Topiramate    Past Medical History:    Anxiety    Arthritis    Back pain    Constipation    Depression    Diabetes (HCC)    Diabetes mellitus (HCC)    Esophageal reflux    Fatigue    Flatulence/gas pain/belching    Frequent use of laxatives    Med complication    H/O degenerative disc disease    Headache disorder    Migraine    High cholesterol    History of depression    History of mental disorder    Hx of motion sickness    Hyperlipidemia    Hypertension    Impaired vision    Lichen sclerosus    Loss of appetite    Lumbar disc herniation    Migraines    Nausea    Periodic with migraines    Nausea and/or vomiting    Osteoarthritis    Pain in joints    Pain with bowel movements    Problems with swallowing    S/P hip replacement, left    Sleep apnea    Sleep disturbance    CPAP    Stress    Type 2 diabetes mellitus (HCC)    Uncomfortable fullness after meals    Migraines no appitite    Visual impairment    glasses for distance    Vomiting    Multiple times daily feb/march lessened  and now none    Wears glasses    Weight loss     Past Surgical History:   Procedure Laterality Date    Biopsy  02/2024    Labia    Colonoscopy  01/01/2007    Evaluate only, bladder (dmg)  2007    Hip replacement surgery  2019    Hip surgery Left 10/21/2019    THR    Other surgical history      SLING (Bladder)    Other surgical history  2010    [facility] sl[facility],    Tonsillectomy       Social History     Socioeconomic History    Marital status:     Number of children: 4   Tobacco Use    Smoking status: Never     Passive exposure: Never    Smokeless tobacco: Never   Vaping Use    Vaping status: Never Used   Substance and Sexual Activity    Alcohol use: Yes     Comment: 1 every couple months    Drug use: No   Other Topics Concern    Caffeine Concern No    Stress Concern Yes    Weight Concern No    Special Diet Yes    Exercise No    Seat Belt Yes     Social Drivers of Health     Financial Resource Strain: Low Risk  (7/18/2024)    Received from [facility]    Overall Financial Resource Strain (CARDIA)     Difficulty of Paying Living Expenses: Not hard at all   Food Insecurity: No Food Insecurity (7/18/2024)    Received from [facility]    Hunger Vital Sign     Worried About Running Out of Food in the Last Year: Never true     Ran Out of Food in the Last Year: Never true   Transportation Needs: No Transportation Needs (7/18/2024)    Received from [facility]    PRAPARE - Transportation     Lack of Transportation (Medical): No     Lack of Transportation (Non-Medical): No   Housing Stability: Low Risk  (7/18/2024)    Received from [facility]    Housing Stability Vital Sign     Unable to Pay for Housing in the Last Year: No     Number of Places Lived in the Last Year: 1     Unstable Housing in the Last Year: No     Family History   Problem Relation Age of Onset    Prostate Cancer Father     Cancer Father 73        prostate     Hypertension Mother     Psychiatric Mother     Migraines Daughter     Diabetes Maternal Grandfather     Breast Cancer Paternal Grandmother 50    Hypertension Sister     Lipids Other     Hypertension Other      Current Medication List:   Current Outpatient Medications   Medication Sig Dispense Refill    Continuous Glucose Sensor (FREESTYLE TANIKA 3 PLUS SENSOR) Does not apply Misc 1 Device every 15 (fifteen) days. 2 each 0    rosuvastatin 20 MG Oral Tab Take 1 tablet (20 mg total) by mouth nightly. 90 tablet 3    semaglutide (OZEMPIC, 0.25 OR 0.5 MG/DOSE,) 2 MG/3ML Subcutaneous Solution Pen-injector As directed 3 mL 0    FARXIGA 10 MG Oral Tab TAKE ONE TABLET BY MOUTH ONE TIME DAILY 30 tablet 0    nortriptyline 25 MG Oral Cap Take 1 capsule (25 mg total) by mouth nightly. 90 capsule 3    lamoTRIgine 200 MG Oral Tab Take 1 tablet (200 mg total) by mouth 2 (two) times daily. 60 tablet 3    ALPRAZolam 1 MG Oral Tab Take 1 tablet (1 mg total) by mouth 2 (two) times daily as needed for Sleep or Anxiety. 60 tablet 3    insulin aspart (NOVOLOG FLEXPEN) 100 Units/mL Subcutaneous Solution Pen-injector INJECT 3X/DAY WITH MEALS AS DIRECTED UP TO TOTAL DAILY DOSE = 20 UNITS 15 mL 0    Omeprazole 40 MG Oral Capsule Delayed Release Take 1 capsule (40 mg total) by mouth daily. 90 capsule 0    insulin degludec (TRESIBA FLEXTOUCH) 100 UNIT/ML Subcutaneous Solution Pen-injector INJECT DAILY AS DIRECTED UP TOTAL DAILY DOSE= 20 UNITS 15 mL 1    ZENPEP 95157-231979 units Oral Cap DR Particles TAKE 1 CAPSULE BY MOUTH 3 TIMES DAILY BEFORE MEALS. PLEASE ALSO TAKE 1 CAPSULE WITH LARGE SNACKS.      Eletriptan Hydrobromide 40 MG Oral Tab use at onset; may repeat once after 4 hours- ONLY 2 IN 24 HOUR PERIOD MAX.  This is a 30 day supply. 8 tablet 11    lurasidone 40 MG Oral Tab Take 1 tablet (40 mg total) by mouth daily with dinner. (Patient not taking: Reported on 11/5/2024) 30 tablet 0    ketoconazole 2 % External Cream Apply 1 Application  topically daily as needed. If symptoms can apply to affected area daily for 2-3 weeks at a time. If not improving after 2-3 weeks please contact physician 30 g 2    Glucose Blood (CONTOUR NEXT TEST) In Vitro Strip Use as directed 100 each 2    ondansetron (ZOFRAN) 4 mg tablet TAKE 1 TABLET BY MOUTH TO BE TAKEN WITH THE FIRST DOSE OF MIGRAINE MEDICATION 30 tablet 2    nystatin 100,000 Units/g External Cream ORDER IS FOR NYSTATIN SUPPOSITORY 100,000 UNITS IN VAGINA NIGHTLY FOR 30 DAYS - WILL FAX ORDER TO [facility] 30 each 0    Insulin Lispro, 1 Unit Dial, 100 UNIT/ML Subcutaneous Solution Pen-injector INJECT 3X/DAY WITH MEALS AS DIRECTED UP TO TOTAL DAILY DOSE = 20 UNITS 15 mL 1    FLUCYTOSINE Does not apply Powder 16% flucytosine cream, apply 5g nightly for 14 days (Patient not taking: Reported on 10/24/2024) 1 each 0    Insulin Pen Needle (BD PEN NEEDLE ARCHANA U/F) 32G X 4 MM Does not apply Misc Inject 1 Pen into the skin 4 (four) times daily. Use to inject insulin 4x/day as directed 200 each 3    estradiol 0.1 MG/GM Vaginal Cream 1 gram per vagina nightly for 2 weeks, then 1 gram per vagina three times per week for at least 10 weeks. May taper to 0.5 grams (pea-sized amount) nightly thereafter. 1 each 3    dicyclomine 10 MG Oral Cap Take 1 capsule (10 mg total) by mouth 3 (three) times daily as needed (abd cramping). (Patient not taking: Reported on 11/5/2024) 90 capsule 1    Microlet Lancets Does not apply Misc Test blood glucose twice daily 200 each 11    Fluticasone Propionate 50 MCG/ACT Nasal Suspension USE 1 SPRAY IN EACH NOSTRIL TWICE A DAY (Patient not taking: Reported on 10/24/2024)             DM associated review of  symptoms:   Endocrine: Polyuria, polyphagia, polydipsia: no  Neurological: Paresthesias: no  + balance issues, memory issues   HEENT: Blurred vision: no  Skin: no rash or wounds  Hematological: Hypoglycemia: no      Review of Systems     LUNGS: denies shortness of breath    CARDIOVASCULAR: denies chest pain  GI: denies abdominal pain, nausea or diarrhea   : denies dysuria        Physical exam:  /70   Pulse 85   Wt 152 lb 6.4 oz (69.1 kg)   LMP 10/21/2012 (LMP Unknown)   SpO2 95%   BMI 26.16 kg/m²   Body mass index is 26.16 kg/m².    Physical Exam   Vitals reviewed.  Constitutional: Normal appearance   Cardiovascular: Normal rate , rhythm   Pulmonary/Chest: Effort normal  Neurological: Alert and oriented .   Psychiatric: Normal mood and affect.  Slow speech, some delay in finding words        Assessment/Plan:    Dyslipidemia:   Last  labs done    Restarted Rosuvastatin rx  Refilled for 90 d w x 3 refills  for adherence        Type 2 diabetes mellitus with hyperglycemia, with long-term current use of insulin ([facility])  A1C: 6.8%  last A1C   Weight: 152 lb     Diabetes control is improving but needs ongoing management and evaluation  given the chronicity of Diabetes, ongoing medication monitoring and risk for complications     Reviewed with patient health impact associated with high glucose trends and the importance of better glucose control to prevent onset /progression of DM complications.     Recommendations:   Pt desires to get off insulin and requesting trial of Ozempic GLP1 rx   Discussed since she may be starting new migraine infusion, we should wait to start - however pt states she is still in insurance approval stage and will not be starting migraine infusion within month       Reviewed the mechanism of action with GLP-1 Jose M- and how they target 6 of the 8 deficits associated  w T2DM     Enhancing insulin secretion in glucose dependent state (no risk of hypoglycemia)   2/3. Inhibit glucagon secretion which results in reduction in hepatic glucose production   4. Corrects the gastrointestinal incretin defect   5. Exert a central effect on the brain to suppress appetite~ which promotes weight loss  6. Weight loss enhances both muscle and hepatic sensitivity to  insulin     Reviewed action, risk vs benefit, dosing, and potential side effects of GLP-1 medications.  Patient denies hx of pancreatitis, gastroparesis, or personal or family hx of medullary thyroid CA or MEN 2. Agreeable to start therapy:   Ozempic: 0.25mg once weekly Subcutaneous x 4 weeks then increase Ozempic dose to 0.5mg once weekly if NO GI side effects   Decrease   Tresiba 15 ---> 10 units injection daily,  Farxiga 10mg once daily     NOVOLOG  sliding scale: three times daily at meals  (changed )   180-220 mg/dl = 2 units  220-260 mg/dl = 3 units  261-300 mg/dl = 4 units  Over  301 mg/dl = 5 units    Will pursue tanika 3+ with DME --> rx sent to [facility]     Reviewed clinical significance of A1c, adverse effects of suboptimal glucose control, and goals of therapy   Reviewed the A1C test, what the value reflects and the goal for the patient.   Reminded pt on A1C and blood sugar targets (Fasting < 130 and post prandial <180 ) and complications associated with hyperglycemia and uncontrolled DM (on AVS)   Recommended SMBG 2- x daily if not using CGM   Reviewed s/s and treatment of hypoglycemia (on AVS)   Reminded to continue with lifestyle modifications since they have positive impact on diabetes/blood sugars/health (portion control, physical activity, weight loss)   Reinforced timing and adherence with medication, self-monitoring of blood glucose and routine follow up    Recommended for  patient to follow up in Diabetes center to review carb goals, food label reading, and offer further support/guidance with exercise planning and weight loss.     The patient is asked to return in 4 but recommended to contact DM clinic sooner if questions or concerns.    The patient indicates understanding of these issues and agrees to the plan.      Orders Placed This Encounter    Interpretation code 72 hour glucose monitor    Continuous Glucose Sensor (FREESTYLE TANIKA 3 PLUS SENSOR) Does not apply Misc     Si Device  every 15 (fifteen) days.     Dispense:  2 each     Refill:  0    rosuvastatin 20 MG Oral Tab     Sig: Take 1 tablet (20 mg total) by mouth nightly.     Dispense:  90 tablet     Refill:  3    semaglutide (OZEMPIC, 0.25 OR 0.5 MG/DOSE,) 2 MG/3ML Subcutaneous Solution Pen-injector     Sig: As directed     Dispense:  3 mL     Refill:  0     LOT [id number] EX [id number] 1 box         Diabetes complications & risks surveillance:   A1C/Blood pressure: as reported above   Nephropathy screening:   NO indication for  ace /arb rx.   LIPID screening:   . rosuvastatin rx.   Last dilated eye exam: Last Dilated Eye Exam: 23   Exam shows retinopathy? Eye Exam shows Diabetic Retinopathy?: No  Date of last PHQ-2 depression screen: PHQ-2 - Date of last depression screenin2024  Last diabetic foot exam: Last Foot Exam: 24      Retina camera NOT available today in EMG8 ; advised pt to make appointment w opth     Spent 50 min obtaining patient history, evaluating patient, reviewing blood glucose trends, discussing treatment options, lifestyle modifications and completing documentation -this time does not including sensor interpretation time if applicable  The risks and benefits of my recommendations, as well as other treatment options were discussed with the patient today. questions were also answered to the best of my knowledge.       Note to patient: The  Cures Act makes medical notes like these available to patients in the interest of transparency. However, be advised this is a medical document. It is intended as peer to peer communication. It is written in medical language and may contain abbreviations or verbiage that are unfamiliar. It may appear blunt or direct. Medical documents are intended to carry relevant information, facts as evident, and the clinical opinion of the practitioner.

## 2024-11-07 NOTE — TELEPHONE ENCOUNTER
Patient called, left message. Returned call, provided Marcos's number at [facility].#[phone]    Patient reports false lows with sensor on abdomen with current sensor overnight, kept alarming so she pulled it out. States it \"pinches\" on posterior upper arm.

## 2024-11-08 ENCOUNTER — PATIENT MESSAGE (OUTPATIENT)
Dept: ENDOCRINOLOGY CLINIC | Facility: CLINIC | Age: 59
End: 2024-11-08

## 2024-11-08 DIAGNOSIS — E11.65 TYPE 2 DIABETES MELLITUS WITH HYPERGLYCEMIA, WITH LONG-TERM CURRENT USE OF INSULIN (HCC): Primary | ICD-10-CM

## 2024-11-08 DIAGNOSIS — Z79.4 TYPE 2 DIABETES MELLITUS WITH HYPERGLYCEMIA, WITH LONG-TERM CURRENT USE OF INSULIN (HCC): Primary | ICD-10-CM

## 2024-11-08 NOTE — TELEPHONE ENCOUNTER
Orders were just sent to [facility] for Ale 3 +, patient had spoken with someone- advised switching to Dexcom- confirming with patient.

## 2024-11-11 RX ORDER — ACYCLOVIR 400 MG/1
1 TABLET ORAL
Qty: 3 EACH | Refills: 11 | Status: SHIPPED | OUTPATIENT
Start: 2024-11-11

## 2024-11-11 NOTE — TELEPHONE ENCOUNTER
Prescription for Dexcom G7 sent to [place] as requested by patient.    Lenore ESPINAL, BC-ADM, [facility]

## 2024-11-13 ENCOUNTER — PATIENT MESSAGE (OUTPATIENT)
Dept: ENDOCRINOLOGY CLINIC | Facility: CLINIC | Age: 59
End: 2024-11-13

## 2024-11-13 NOTE — TELEPHONE ENCOUNTER
RN contacted patient. Patient seen 11/05/2024 and Ozempic 0.25 mg injection once weekly was initiated (second dose taken today). Tresiba was dropped from 15 units >  10 units once daily, patient reports higher numbers since decreasing.  Recent switch from Ale 3 to Dexcom G7 per insurance preference, patient not really liking dexcom so far.  Did not have clarity stephanie, so data not yet streaming with office.  Call with patient, asked her to download Dexcom clarity stephanie, add our practice code # [id number].  She will work on this later.    Fasting numbers 130's-150's, before meals 160-170's, spiking right after meals, but reports she is normally under 180 mg/dl two hours after meal.  Patient wondering if she could increase her tresiba again temporarily until she is at higher dose of ozempic.  States she is using more humalog prn. Okay to temporarily increase Tresiba until Ozempic dose is increased or blood sugar levels decrease?  Patient aware we will likely get back to her tomorrow with recommendations.    Last visit:  Ozempic: 0.25mg once weekly Subcutaneous x 4 weeks then increase Ozempic dose to 0.5mg once weekly if NO GI side effects   Decrease   Tresiba 15 ---> 10 units injection daily,  Farxiga 10mg once daily      NOVOLOG  sliding scale: three times daily at meals  (changed )   180-220 mg/dl = 2 units  220-260 mg/dl = 3 units  261-300 mg/dl = 4 units  Over  301 mg/dl = 5 units

## 2024-11-14 NOTE — TELEPHONE ENCOUNTER
Okay for patient to increase Tresiba to 12 units injection daily until Ozempic dose increases.  I    Lenore ESPINAL, BC-ADM, [facility]

## 2024-11-14 NOTE — TELEPHONE ENCOUNTER
Patient now streaming.    -----------------------------  Dexcom Clarity  -----------------------------  Vero Michel  YOB: 1965  Generated at: Thu, Nov 14, 2024 8:41 AM CST  Reporting period: Fri Nov 1, 2024 - Thu Nov 14, 2024  -----------------------------  Glucose Details  Average glucose: 172 mg/dL  Standard deviation: 31 mg/dL  -----------------------------  Time in Range  Very High: 2%  High: 32%  In Range: 66%  Low: 0%  Very Low: 0%  Target Range   mg/dL    -----------------------------  CGM Details  Sensor usage: 14%  Days with CGM data: 2/14

## 2024-11-14 NOTE — TELEPHONE ENCOUNTER
Patient reported she has not met her deductible so sensors were going to cost $240.  She is currently using pharmacy.

## 2024-11-18 NOTE — TELEPHONE ENCOUNTER
Contacted patient to discuss concerns.  Patient is aware that ELLY Elders KYLIE is currently out of office but will be continuing to management her diabetes.  Patient is will be taking 3rd dose of Ozempic 0.25 mg this week.  Discussed taking 0.25mg dose this week and next week then if continuing to tolerating increase to 0.5mg injection weekly.  Patient will resume Tresiba dose of 15mg injection daily until Ozempic dose increased to 0.5mg then will decrease Tresiba dose back to 12 units.  Patient verbalizes understanding and has no further questions at this time.    Lenore ESPINAL, BC-ADM, [facility]

## 2024-11-18 NOTE — TELEPHONE ENCOUNTER
Dexcom Clarity reports reviewed, no hypoglycemia noted with recent increase in Tresiba.     -----------------------------  Dexcom Clarity  -----------------------------  Vero Michel  YOB: 1965  Generated at: Mon, Nov 18, 2024 12:46 PM CST  Reporting period: Fri Nov 15, 2024 - Mon Nov 18, 2024  -----------------------------  Glucose Details  Average glucose: 159 mg/dL  Standard deviation: 22 mg/dL  -----------------------------  Time in Range  Very High: 0%  High: 17%  In Range: 83%  Low: 0%  Very Low: 0%  Target Range   mg/dL    -----------------------------  CGM Details  Sensor usage: 100%  Days with CGM data: 4/4

## 2024-11-22 ENCOUNTER — LAB ENCOUNTER (OUTPATIENT)
Dept: LAB | Age: 59
End: 2024-11-22
Attending: Other
Payer: COMMERCIAL

## 2024-11-22 DIAGNOSIS — F31.81 BIPOLAR II DISORDER (HCC): ICD-10-CM

## 2024-11-22 DIAGNOSIS — Z51.81 ENCOUNTER FOR THERAPEUTIC DRUG MONITORING: Primary | ICD-10-CM

## 2024-11-22 PROCEDURE — 36415 COLL VENOUS BLD VENIPUNCTURE: CPT

## 2024-11-22 PROCEDURE — 80175 DRUG SCREEN QUAN LAMOTRIGINE: CPT

## 2024-11-24 LAB — LAMOTRIGINE LVL: 8.1 UG/ML

## 2024-11-27 ENCOUNTER — EMPLOYEE HEALTH (OUTPATIENT)
Dept: OTHER | Facility: HOSPITAL | Age: 59
End: 2024-11-27
Attending: NURSE PRACTITIONER

## 2024-11-27 DIAGNOSIS — Z01.84 IMMUNITY STATUS TESTING: Primary | ICD-10-CM

## 2024-11-27 DIAGNOSIS — Z11.1 SCREENING-PULMONARY TB: ICD-10-CM

## 2024-11-27 PROCEDURE — 86735 MUMPS ANTIBODY: CPT

## 2024-11-29 DIAGNOSIS — N18.32 TYPE 2 DIABETES MELLITUS WITH STAGE 3B CHRONIC KIDNEY DISEASE, WITHOUT LONG-TERM CURRENT USE OF INSULIN (HCC): ICD-10-CM

## 2024-11-29 DIAGNOSIS — E11.22 TYPE 2 DIABETES MELLITUS WITH STAGE 3B CHRONIC KIDNEY DISEASE, WITHOUT LONG-TERM CURRENT USE OF INSULIN (HCC): ICD-10-CM

## 2024-11-29 LAB — MUV IGG SER IA-ACNC: <5 AU/ML (ref 11–?)

## 2024-11-29 RX ORDER — DAPAGLIFLOZIN 10 MG/1
10 TABLET, FILM COATED ORAL DAILY
Qty: 30 TABLET | Refills: 0 | Status: SHIPPED | OUTPATIENT
Start: 2024-11-29

## 2024-11-29 NOTE — TELEPHONE ENCOUNTER
Requested Prescriptions     Pending Prescriptions Disp Refills    FARXIGA 10 MG Oral Tab [Pharmacy Med Name: Farxiga 10 Mg Tab Astr] 30 tablet 0     Sig: TAKE ONE TABLET BY MOUTH ONE TIME DAILY     HGBA1C:    Lab Results   Component Value Date    A1C 6.8 (H) 10/24/2024    A1C 7.4 (H) 05/16/2024    A1C 8.5 (A) 01/29/2024     (H) 10/24/2024     Your Appointments      Tuesday January 14, 2025 11:00 AM  Consult with Benoit Ruiz MD  [facility] ([facility]) [address]  [facility] [id number] [phone]        Tuesday March 11, 2025 11:15 AM  Follow Up Visit with KYLIE Bolden  [facility], [facility] ([facility]) [address]  [facility] [address] [phone]   Contact your primary care provider if your insurance requires a referral.    Please arrive 15 minutes prior to your scheduled appointment. Be sure to bring your current Insurance card, Photo ID, and medication bottles or a list of your current medications.      A 24 hour notice is required to cancel any appointment or you may be charged a $40 No Show Fee.     Important: 24 hour notice is required to cancel any appointment or you may be charged a $40 No Show Fee. Please notify your physician office.               Last OFFICE VISIT: 11-5-2024-CB    Last Refill:  10--30 tabs with 0 refills-DH    Per last note:    Farxiga 10mg once daily

## 2024-12-11 ENCOUNTER — TELEPHONE (OUTPATIENT)
Dept: INTERNAL MEDICINE CLINIC | Facility: HOSPITAL | Age: 59
End: 2024-12-11

## 2024-12-11 DIAGNOSIS — Z20.822 SUSPECTED COVID-19 VIRUS INFECTION: Primary | ICD-10-CM

## 2024-12-12 ENCOUNTER — LAB ENCOUNTER (OUTPATIENT)
Dept: LAB | Age: 59
End: 2024-12-12
Attending: PREVENTIVE MEDICINE
Payer: COMMERCIAL

## 2024-12-12 DIAGNOSIS — Z20.822 SUSPECTED COVID-19 VIRUS INFECTION: ICD-10-CM

## 2024-12-12 LAB — SARS-COV-2 RNA RESP QL NAA+PROBE: DETECTED

## 2024-12-19 ENCOUNTER — PATIENT MESSAGE (OUTPATIENT)
Dept: ENDOCRINOLOGY CLINIC | Facility: CLINIC | Age: 59
End: 2024-12-19

## 2024-12-20 NOTE — TELEPHONE ENCOUNTER
Sounds like patient took potentially missed dose yesterday, 12/19/24 - was not sure if she had dosed on the 10th and 17th per her usual schedule. Advised patient to start on the 0.5 mg next week and decrease Tresiba to 12 units.

## 2024-12-22 NOTE — TELEPHONE ENCOUNTER
-----------------------------  Dexcom Clarity  -----------------------------  Vero Michel  YOB: 1965  Generated at: Sun, Dec 22, 2024 2:54 PM CST  Reporting period: Mon Dec 9, 2024 - Sun Dec 22, 2024  -----------------------------  Glucose Details  Average glucose: 146 mg/dL  Standard deviation: 29 mg/dL  GMI: N/A  -----------------------------  Time in Range  Very High: 0%  High: 14%  In Range: 86%  Low: 0%  Very Low: 0%  Target Range   mg/dL    -----------------------------  CGM Details  Sensor usage: 57%  Days with CGM data: 8/14

## 2024-12-29 ENCOUNTER — PATIENT MESSAGE (OUTPATIENT)
Dept: ENDOCRINOLOGY CLINIC | Facility: CLINIC | Age: 59
End: 2024-12-29

## 2024-12-29 DIAGNOSIS — E11.65 TYPE 2 DIABETES MELLITUS WITH HYPERGLYCEMIA, WITH LONG-TERM CURRENT USE OF INSULIN (HCC): ICD-10-CM

## 2024-12-29 DIAGNOSIS — Z79.4 TYPE 2 DIABETES MELLITUS WITH HYPERGLYCEMIA, WITH LONG-TERM CURRENT USE OF INSULIN (HCC): ICD-10-CM

## 2024-12-30 RX ORDER — INSULIN DEGLUDEC 100 U/ML
INJECTION, SOLUTION SUBCUTANEOUS
Qty: 15 ML | Refills: 1 | Status: SHIPPED | OUTPATIENT
Start: 2024-12-30

## 2024-12-30 NOTE — TELEPHONE ENCOUNTER
Pended tresiba refill to requested pharmacy  Requested Prescriptions     Pending Prescriptions Disp Refills    insulin degludec (TRESIBA FLEXTOUCH) 100 UNIT/ML Subcutaneous Solution Pen-injector 15 mL 1     Sig: INJECT DAILY AS DIRECTED UP TOTAL DAILY DOSE= 20 UNITS     Your appointments       Date & Time Appointment Department ([place])    Feb 27, 2025 2:00 PM CST Consult with Benoit Ruiz MD [facility] [place] ([facility])        Apr 07, 2025 9:45 AM CDT Follow Up Visit with Mecca Ann APRN [facility] ([facility] [place])    Contact your primary care provider if your insurance requires a referral.    Please arrive 15 minutes prior to your scheduled appointment. Be sure to bring your current Insurance card, Photo ID, and medication bottles or a list of your current medications.      A 24 hour notice is required to cancel any appointment or you may be charged a $40 No Show Fee.     Important: 24 hour notice is required to cancel any appointment or you may be charged a $40 No Show Fee. Please notify your physician office.               [facility]  [facility] [place] [address] [address], [address] [address]  [phone] [facility]  [address] [address] [phone]          Last A1c value was 6.8% done 10/24/2024.    Refill 4/22/24  LOV 11/5/24

## 2024-12-31 DIAGNOSIS — E11.22 TYPE 2 DIABETES MELLITUS WITH STAGE 3B CHRONIC KIDNEY DISEASE, WITHOUT LONG-TERM CURRENT USE OF INSULIN (HCC): ICD-10-CM

## 2024-12-31 DIAGNOSIS — N18.32 TYPE 2 DIABETES MELLITUS WITH STAGE 3B CHRONIC KIDNEY DISEASE, WITHOUT LONG-TERM CURRENT USE OF INSULIN (HCC): ICD-10-CM

## 2024-12-31 RX ORDER — DAPAGLIFLOZIN 10 MG/1
10 TABLET, FILM COATED ORAL DAILY
Qty: 30 TABLET | Refills: 0 | Status: SHIPPED | OUTPATIENT
Start: 2024-12-31

## 2024-12-31 NOTE — TELEPHONE ENCOUNTER
Requested Prescriptions     Pending Prescriptions Disp Refills    FARXIGA 10 MG Oral Tab [Pharmacy Med Name: Farxiga 10 Mg Tab Astr] 30 tablet 0     Sig: TAKE ONE TABLET BY MOUTH ONE TIME DAILY     HGBA1C:    Lab Results   Component Value Date    A1C 6.8 (H) 10/24/2024    A1C 7.4 (H) 05/16/2024    A1C 8.5 (A) 01/29/2024     (H) 10/24/2024     Your Appointments      Thursday February 27, 2025 2:00 PM  Consult with Benoit Ruiz MD  [facility] ([facility]) [address] [id number] [phone]        Monday April 07, 2025 9:45 AM  Follow Up Visit with KYLIE Bolden  [facility], [facility] ([facility] [place]) [address] , [address] [id number] [phone]   Contact your primary care provider if your insurance requires a referral.    Please arrive 15 minutes prior to your scheduled appointment. Be sure to bring your current Insurance card, Photo ID, and medication bottles or a list of your current medications.      A 24 hour notice is required to cancel any appointment or you may be charged a $40 No Show Fee.     Important: 24 hour notice is required to cancel any appointment or you may be charged a $40 No Show Fee. Please notify your physician office.               Last OFFICE VISIT: 11-5-2024-CB    Last refill:  11--30 tabs with 0 refills-CB

## 2025-01-13 ENCOUNTER — PATIENT MESSAGE (OUTPATIENT)
Dept: ENDOCRINOLOGY CLINIC | Facility: CLINIC | Age: 60
End: 2025-01-13

## 2025-01-13 NOTE — TELEPHONE ENCOUNTER
Tolerating Ozempic 0.5mg once weekly   Based on dexcom - decrease Tresiba to 8 units   Check dexcom in 3 weeks     My chart message sent

## 2025-01-27 DIAGNOSIS — N18.32 TYPE 2 DIABETES MELLITUS WITH STAGE 3B CHRONIC KIDNEY DISEASE, WITHOUT LONG-TERM CURRENT USE OF INSULIN (HCC): ICD-10-CM

## 2025-01-27 DIAGNOSIS — E11.22 TYPE 2 DIABETES MELLITUS WITH STAGE 3B CHRONIC KIDNEY DISEASE, WITHOUT LONG-TERM CURRENT USE OF INSULIN (HCC): ICD-10-CM

## 2025-01-27 RX ORDER — DAPAGLIFLOZIN 10 MG/1
10 TABLET, FILM COATED ORAL DAILY
Qty: 30 TABLET | Refills: 0 | Status: SHIPPED | OUTPATIENT
Start: 2025-01-27

## 2025-01-27 NOTE — TELEPHONE ENCOUNTER
Requested Prescriptions     Pending Prescriptions Disp Refills    FARXIGA 10 MG Oral Tab [Pharmacy Med Name: Farxiga 10 Mg Tab Astr] 30 tablet 0     Sig: TAKE ONE TABLET BY MOUTH ONE TIME DAILY     Future Appointments   Date Time Provider Department Center   2/27/2025  2:00 PM Benoit Ruiz MD [facility]   4/7/2025  9:45 AM Mecca Ann APRN [facility]     Last A1c value was 6.8% done 10/24/2024.  Refill 12/31/24 Heriberto   LOV 11/05/24 Heriberto

## 2025-01-30 DIAGNOSIS — E11.22 TYPE 2 DIABETES MELLITUS WITH STAGE 3B CHRONIC KIDNEY DISEASE, WITHOUT LONG-TERM CURRENT USE OF INSULIN (HCC): ICD-10-CM

## 2025-01-30 DIAGNOSIS — N18.32 TYPE 2 DIABETES MELLITUS WITH STAGE 3B CHRONIC KIDNEY DISEASE, WITHOUT LONG-TERM CURRENT USE OF INSULIN (HCC): ICD-10-CM

## 2025-01-31 RX ORDER — DAPAGLIFLOZIN 10 MG/1
10 TABLET, FILM COATED ORAL DAILY
Qty: 90 TABLET | Refills: 0 | Status: SHIPPED | OUTPATIENT
Start: 2025-01-31 | End: 2025-04-07

## 2025-01-31 NOTE — TELEPHONE ENCOUNTER
Signed 4 days ago (1/27/2025):   FARXIGA 10 MG Oral Tab   Sig: TAKE ONE TABLET BY MOUTH ONE TIME DAILY   Disp: 30 tablet    Refills: 0   Signed by: Mecca Ann APRN   Encounter Details       Please deny medication.

## 2025-02-03 ENCOUNTER — LAB ENCOUNTER (OUTPATIENT)
Dept: LAB | Age: 60
End: 2025-02-03
Attending: Other
Payer: COMMERCIAL

## 2025-02-03 DIAGNOSIS — F31.0 BIPOLAR I DISORDER, MOST RECENT EPISODE HYPOMANIC (HCC): ICD-10-CM

## 2025-02-03 DIAGNOSIS — Z51.81 ENCOUNTER FOR THERAPEUTIC DRUG MONITORING: Primary | ICD-10-CM

## 2025-02-03 LAB
ANION GAP SERPL CALC-SCNC: 7 MMOL/L (ref 0–18)
BUN BLD-MCNC: 14 MG/DL (ref 9–23)
CALCIUM BLD-MCNC: 10.1 MG/DL (ref 8.7–10.6)
CHLORIDE SERPL-SCNC: 105 MMOL/L (ref 98–112)
CO2 SERPL-SCNC: 27 MMOL/L (ref 21–32)
CREAT BLD-MCNC: 0.85 MG/DL
EGFRCR SERPLBLD CKD-EPI 2021: 78 ML/MIN/1.73M2 (ref 60–?)
FASTING STATUS PATIENT QL REPORTED: YES
GLUCOSE BLD-MCNC: 129 MG/DL (ref 70–99)
LITHIUM SERPL-SCNC: 0.2 MMOL/L (ref 0.6–1.2)
OSMOLALITY SERPL CALC.SUM OF ELEC: 290 MOSM/KG (ref 275–295)
POTASSIUM SERPL-SCNC: 4.7 MMOL/L (ref 3.5–5.1)
SODIUM SERPL-SCNC: 139 MMOL/L (ref 136–145)

## 2025-02-03 PROCEDURE — 80178 ASSAY OF LITHIUM: CPT

## 2025-02-03 PROCEDURE — 36415 COLL VENOUS BLD VENIPUNCTURE: CPT

## 2025-02-03 PROCEDURE — 80048 BASIC METABOLIC PNL TOTAL CA: CPT

## 2025-02-06 DIAGNOSIS — Z79.4 TYPE 2 DIABETES MELLITUS WITH HYPERGLYCEMIA, WITH LONG-TERM CURRENT USE OF INSULIN (HCC): ICD-10-CM

## 2025-02-06 DIAGNOSIS — E11.65 TYPE 2 DIABETES MELLITUS WITH HYPERGLYCEMIA, WITH LONG-TERM CURRENT USE OF INSULIN (HCC): ICD-10-CM

## 2025-02-07 RX ORDER — SEMAGLUTIDE 0.68 MG/ML
0.5 INJECTION, SOLUTION SUBCUTANEOUS WEEKLY
Qty: 3 ML | Refills: 0 | Status: SHIPPED | OUTPATIENT
Start: 2025-02-07

## 2025-02-07 NOTE — TELEPHONE ENCOUNTER
Requested Prescriptions     Pending Prescriptions Disp Refills    OZEMPIC, 0.25 OR 0.5 MG/DOSE, 2 MG/3ML Subcutaneous Solution Pen-injector [Pharmacy Med Name: Ozempic 2 Mg/3ml Inj Radha] 3 mL 0     Sig: Inject 0.5 mg into the skin once a week     HGBA1C:    Lab Results   Component Value Date    A1C 6.8 (H) 10/24/2024    A1C 7.4 (H) 05/16/2024    A1C 8.5 (A) 01/29/2024     (H) 10/24/2024     Your Appointments      Thursday February 27, 2025 2:00 PM  Consult with Benoit Ruiz MD  [facility] [address] [address] [phone]        Monday April 07, 2025 9:45 AM  Follow Up Visit with KYLIE Bolden  [facility] ([facility] [place]) [address], [address] [address] [phone]   Contact your primary care provider if your insurance requires a referral.    Please arrive 15 minutes prior to your scheduled appointment. Be sure to bring your current Insurance card, Photo ID, and medication bottles or a list of your current medications.      A 24 hour notice is required to cancel any appointment or you may be charged a $40 No Show Fee.     Important: 24 hour notice is required to cancel any appointment or you may be charged a $40 No Show Fee. Please notify your physician office.               Last OFFICE VISIT: 11-5-2024- LONNIE     Last refill:  12-- CB

## 2025-02-17 ENCOUNTER — PATIENT MESSAGE (OUTPATIENT)
Dept: ENDOCRINOLOGY CLINIC | Facility: CLINIC | Age: 60
End: 2025-02-17

## 2025-02-17 ENCOUNTER — PATIENT MESSAGE (OUTPATIENT)
Dept: FAMILY MEDICINE CLINIC | Facility: CLINIC | Age: 60
End: 2025-02-17

## 2025-02-19 NOTE — TELEPHONE ENCOUNTER
Patient tolerating Ozempic 0.5mg well, but feels hungrier and needs to eat/snack every 3 hours.  Patient agreeable to increase if APRN agrees, will pend.  Will be due for new pen for 03/07/2025 injection.      Last office visit 11/05/2024  Future Appointments   Date Time Provider Department Center   2/27/2025  2:00 PM Benoit Ruiz MD [facility] [place]   3/13/2025  9:30 AM [facility] [address] [facility] [facility]   3/13/2025 11:00 AM WDR RADHA RM1 WDR RADHA EDW [facility]   4/7/2025  9:45 AM Mecca Ann APRN [facility]

## 2025-02-20 NOTE — TELEPHONE ENCOUNTER
Reviewed dexcom ; increasing Ozempic to 1.0mg and patient asking if tresiba dose should be decreased.       Recommend : HOLD Tresiba 8 units daily once ozempic 1.0mg weekly started       Continue   Farxiga 10mg once daily      NOVOLOG  sliding scale: three times daily at meals  (changed )   180-220 mg/dl = 2 units  220-260 mg/dl = 3 units  261-300 mg/dl = 4 units  Over  301 mg/dl = 5 units        -----------------------------  Dexcom Clarity  -----------------------------  Vero Michel    YOB: 1965    Generated at: Thu, Feb 20, 2025 9:14 AM CST    Reporting period: Fri Feb 7, 2025 - Thu Feb 20, 2025  -----------------------------  Glucose Details    Average glucose: 154 mg/dL    GMI: 7.0%    Standard deviation: 26 mg/dL    Coefficient of Variation: 16.9%  -----------------------------  Time in Range    Very High: 0%    High: 17%    In Range: 83%    Low: 0%    Very Low: 0%    Target Range   mg/dL    -----------------------------  Sensor usage    Days with data: 12/14    Time active: 90%    Avg. calibrations per day: 0.0

## 2025-02-26 NOTE — H&P
[facility] - Gastroenterology                                                                                                  Clinic History and Physical       Referring provider: Shane Melchor DO    Chief Complaint   Patient presents with    Consult     Wanted to change GI docs; PCP referred her here     HPI:   Vero Michel is a 59 year old woman with history of BMI of 25, diabetes, major depressive disorder, hyperlipidemia, multiple musculoskeletal problems, panic disorder, obsessive-compulsive disorder, hip surgery, tonsillectomy here to establish care    Says she has had dysphagia described as occasional issues with swallowing food predominantly like bread on occasion where it feels like it gets stuck.  This has happened for about 20 years.  Has not been progressive or worsening.  Has been on omeprazole for prescribed Godwin's for the last 1 to 2 years.  Does not necessarily feel like it has helped with this problem.  Also notes constipation and has tried Colace which has not helped much.  Says she was diagnosed as having pancreatic insufficiency though has been off Zenpep for the last couple months.  She denies any issues with weight loss, diarrhea, blood in the stool, abdominal pain.  No family history of gastrointestinal cancer.    History, Medications, Allergies, ROS:      Past Medical History:    Anxiety    Arthritis    Back pain    Constipation    Depression    Diabetes (HCC)    Diabetes mellitus (HCC)    Esophageal reflux    Fatigue    Flatulence/gas pain/belching    Frequent use of laxatives    Med complication    H/O degenerative disc disease    Headache disorder    Migraine    High cholesterol    History of depression    History of mental disorder    Hx of motion sickness    Hyperlipidemia    Hypertension    Impaired vision    Lichen sclerosus    Loss of appetite    Lumbar disc herniation     Migraines    Nausea    Periodic with migraines    Nausea and/or vomiting    Osteoarthritis    Pain in joints    Pain with bowel movements    Problems with swallowing    S/P hip replacement, left    Sleep apnea    Sleep disturbance    CPAP    Stress    Type 2 diabetes mellitus (HCC)    Uncomfortable fullness after meals    Migraines no appitite    Visual impairment    glasses for distance    Vomiting    Multiple times daily feb/march lessened and now none    Wears glasses    Weight loss      Past Surgical History:   Procedure Laterality Date    Biopsy  02/2024    Labia    Colonoscopy  01/01/2007    Evaluate only, bladder (dmg)  2007    Hip replacement surgery  2019    Hip surgery Left 10/21/2019    THR    Other surgical history      SLING (Bladder)    Other surgical history  2010    bladder sling at [facility],    Tonsillectomy        Family Hx:   Family History   Problem Relation Age of Onset    Prostate Cancer Father     Cancer Father 73        prostate    Hypertension Mother     Psychiatric Mother     Migraines Daughter     Diabetes Maternal Grandfather     Breast Cancer Paternal Grandmother 50    Hypertension Sister     Lipids Other     Hypertension Other       Social History:   Social History     Socioeconomic History    Marital status:     Number of children: 4   Tobacco Use    Smoking status: Never     Passive exposure: Never    Smokeless tobacco: Never   Vaping Use    Vaping status: Never Used   Substance and Sexual Activity    Alcohol use: Yes     Comment: 1 every couple months    Drug use: No   Other Topics Concern    Caffeine Concern No    Stress Concern Yes    Weight Concern No    Special Diet Yes    Exercise No    Seat Belt Yes     Social Drivers of Health     Food Insecurity: No Food Insecurity (7/18/2024)    Received from [facility]    Hunger Vital Sign     Worried About Running Out of Food in the Last Year: Never true     Ran Out of Food in the Last Year: Never true    Transportation Needs: No Transportation Needs (7/18/2024)    Received from [facility]    PRAPARE - Transportation     Lack of Transportation (Medical): No     Lack of Transportation (Non-Medical): No   Housing Stability: Low Risk  (7/18/2024)    Received from [facility]    Housing Stability Vital Sign     Unable to Pay for Housing in the Last Year: No     Number of Places Lived in the Last Year: 1     Unstable Housing in the Last Year: No        Medications (Active prior to today's visit):  Current Outpatient Medications   Medication Sig Dispense Refill    semaglutide 4 MG/3ML Subcutaneous Solution Pen-injector Inject 1 mg into the skin once a week. 3 mL 0    dapagliflozin (FARXIGA) 10 MG Oral Tab TAKE ONE TABLET BY MOUTH ONE TIME DAILY 90 tablet 0    Continuous Glucose Sensor (DEXCOM G7 SENSOR) Does not apply Misc 1 each Every 10 days. Use as directed every 10 days 3 each 11    ALPRAZolam 1 MG Oral Tab Take 1 tablet (1 mg total) by mouth 2 (two) times daily as needed for Sleep or Anxiety. 60 tablet 3    lamoTRIgine 200 MG Oral Tab Take 1 tablet (200 mg total) by mouth 2 (two) times daily. 60 tablet 3    Continuous Glucose Sensor (FREESTYLE TANIKA 3 PLUS SENSOR) Does not apply Misc 1 Device every 15 (fifteen) days. 2 each 0    rosuvastatin 20 MG Oral Tab Take 1 tablet (20 mg total) by mouth nightly. 90 tablet 3    ketoconazole 2 % External Cream Apply 1 Application topically daily as needed. If symptoms can apply to affected area daily for 2-3 weeks at a time. If not improving after 2-3 weeks please contact physician 30 g 2    Glucose Blood (CONTOUR NEXT TEST) In Vitro Strip Use as directed 100 each 2    nortriptyline 25 MG Oral Cap Take 1 capsule (25 mg total) by mouth nightly. 90 capsule 3    ondansetron (ZOFRAN) 4 mg tablet TAKE 1 TABLET BY MOUTH TO BE TAKEN WITH THE FIRST DOSE OF MIGRAINE MEDICATION 30 tablet 2    nystatin 100,000 Units/g External Cream ORDER IS FOR  NYSTATIN SUPPOSITORY 100,000 UNITS IN VAGINA NIGHTLY FOR 30 DAYS - WILL FAX ORDER TO [facility] 30 each 0    insulin aspart (NOVOLOG FLEXPEN) 100 Units/mL Subcutaneous Solution Pen-injector INJECT 3X/DAY WITH MEALS AS DIRECTED UP TO TOTAL DAILY DOSE = 20 UNITS 15 mL 0    Omeprazole 40 MG Oral Capsule Delayed Release Take 1 capsule (40 mg total) by mouth daily. 90 capsule 0    Insulin Lispro, 1 Unit Dial, 100 UNIT/ML Subcutaneous Solution Pen-injector INJECT 3X/DAY WITH MEALS AS DIRECTED UP TO TOTAL DAILY DOSE = 20 UNITS 15 mL 1    FLUCYTOSINE Does not apply Powder 16% flucytosine cream, apply 5g nightly for 14 days (Patient not taking: Reported on 10/24/2024) 1 each 0    Insulin Pen Needle (BD PEN NEEDLE ARCHANA U/F) 32G X 4 MM Does not apply Misc Inject 1 Pen into the skin 4 (four) times daily. Use to inject insulin 4x/day as directed 200 each 3    estradiol 0.1 MG/GM Vaginal Cream 1 gram per vagina nightly for 2 weeks, then 1 gram per vagina three times per week for at least 10 weeks. May taper to 0.5 grams (pea-sized amount) nightly thereafter. 1 each 3    ZENPEP 72863-978195 units Oral Cap DR Particles TAKE 1 CAPSULE BY MOUTH 3 TIMES DAILY BEFORE MEALS. PLEASE ALSO TAKE 1 CAPSULE WITH LARGE SNACKS.      Eletriptan Hydrobromide 40 MG Oral Tab use at onset; may repeat once after 4 hours- ONLY 2 IN 24 HOUR PERIOD MAX.  This is a 30 day supply. 8 tablet 11    dicyclomine 10 MG Oral Cap Take 1 capsule (10 mg total) by mouth 3 (three) times daily as needed (abd cramping). (Patient not taking: Reported on 11/5/2024) 90 capsule 1    Microlet Lancets Does not apply Misc Test blood glucose twice daily 200 each 11    Fluticasone Propionate 50 MCG/ACT Nasal Suspension USE 1 SPRAY IN EACH NOSTRIL TWICE A DAY (Patient not taking: Reported on 10/24/2024)       Allergies:  Allergies[1]    ROS:   Systems were reviewed and were negative except as noted in the HPI    PHYSICAL EXAM:   Blood pressure 120/76, height 5' 4\"  (1.626 m), weight 151 lb (68.5 kg), last menstrual period 10/21/2012, not currently breastfeeding.    General:awake, cooperative, no acute distress  HEENT: EOMI, no scleral icterus, MMM; oral pharnyx is without exudates or lesions  Neck: no lymphadenopathy; thyroid is not enlarged and without nodules  CV: RRR  Resp: non-labored breathing  Abd: soft, non-tender, non-distended  Ext: no lower extremity swelling  Neuro: Alert, Oriented X 3  Skin: no rashes, bruises  Psych: normal affect    Labs/Imaging:     Reviewed as noted in the HPI and A/P    ASSESSMENT/PLAN:   Vero Michel is a 59 year old woman with history of BMI of 25, diabetes, major depressive disorder, hyperlipidemia, multiple musculoskeletal problems, panic disorder, obsessive-compulsive disorder, hip surgery, tonsillectomy here to establish care    Review of the chart notes prior gastrointestinal care with [facility] from 20 22-20 24.  I reviewed a office visit with her gastroenterologist from January 2024 which summarizes care including use of MiraLAX daily, upper endoscopy and colonoscopy in 2022 for nausea and vomiting with findings including incomplete intestinal metaplasia of the esophagus and increased eosinophilia of the distal esophagus as well as ileocecal valve biopsies with enteritis.  Recommendations were for PPI therapy.  I reviewed her last EGD report with gastroenterology from [facility] in August 2023 for noted indication of dysphagia with findings of 1 cm segment suggestive of Godwin's esophagus, large food bezoar in the stomach.  Colonoscopy is noted to have been completed in May 2023 for weight loss and change in bowel habits with findings of their bowel preparation polypoid ileocecal valve biopsied, 5 mm ascending colon polyp removed, random colon biopsies, grade 2 internal hemorrhoids and recommendations to repeat the colonoscopy in 3 years.  Further review of the chart notes stool testing from August 2023 including a qualitative  fecal fat which is normal and a pancreatic elastase which is mildly below normal at 169. There are also multiple fecal calprotectins which have been elevated to various degrees over the last 2 years. There is an MRI/MRCP from September 2023 for indication of pancreatic exocrine insufficiency which is unremarkable.     We discussed at this point the symptoms she is having which is constipation which would be most consistent with chronic idiopathic constipation.  We discussed recommendations for starting a fiber on a regular basis and if not improved using MiraLAX additionally or its generic equivalent.  We discussed her dysphagia which has been present for 20 years.  We discussed the findings described in her prior EGD as Godwin's esophagus however we did discuss possibility this is simply irregular Z-line and gastric intestinal metaplasia of the cardia which would be different in regards to implications and management and surveillance.  We also discussed the eosinophilia noted from her esophagus biopsies in the distal portions.  We discussed eosinophilic esophagitis and while the biopsies are not 100% diagnostic at this certainly possibility.  Based on the diagnoses of Godwin's and possible eosinophilic esophagitis we discussed it would be reasonable to continue PPI at this time.  Discussed we could routinely repeat her EGD in the future probably at the time of routine colonoscopy to reevaluate the diagnosis of Godwin's and possibility of eosinophilic esophagitis.  We also discussed possibility of further testing at this time with an esophagram though she feels she is doing okay at this time without severe symptoms of vomiting, unintentional weight loss for which she prefers to hold on further testing at this time.  We also discussed the diagnosis she has been given of pancreatic insufficiency.  She has no risk factors for pancreatitis.  She has never had pancreatitis.  And her cross-sectional imaging on MRI  shows a normal-appearing pancreas.  I discussed the challenging nature of the diagnosis of the pancreatic insufficiency simply based on the stool test she had.  Furthermore she has no symptoms to suggest pancreatic insufficiency.  We discussed options of continuing the medication which can be expensive and require multiple pills daily versus simply discontinuing it based on the above and lack of symptoms at this time of which she prefers this.  We discussed signs and symptoms to monitor for to let me know of.  As we discussed planning for her routine colonoscopy as below based on the findings noted previously.  She is comfortable with the latest discussion and planned outlined will let me know if if there are any further issues prior to routine follow-up recommended in 1 year.    Recommend:  -continue PPI once daily  -consider further evaluation of dysphagia pending preferences and clinical course  -discontinue pancreas enzymes at this time  -routine follow up in 1 year  -routine EGD and colonoscopy in May 2026    Orders This Visit:  No orders of the defined types were placed in this encounter.    Meds This Visit:  Requested Prescriptions      No prescriptions requested or ordered in this encounter     Imaging & Referrals:  None     Benoit Ruiz MD  [facility] - Gastroenterology  2/27/2025             [1]   Allergies  Allergen Reactions    Topiramate CONFUSION

## 2025-02-27 ENCOUNTER — OFFICE VISIT (OUTPATIENT)
Dept: GASTROENTEROLOGY | Facility: CLINIC | Age: 60
End: 2025-02-27

## 2025-02-27 VITALS
WEIGHT: 151 LBS | HEIGHT: 64 IN | SYSTOLIC BLOOD PRESSURE: 120 MMHG | DIASTOLIC BLOOD PRESSURE: 76 MMHG | BODY MASS INDEX: 25.78 KG/M2

## 2025-02-27 DIAGNOSIS — R13.10 DYSPHAGIA, UNSPECIFIED TYPE: ICD-10-CM

## 2025-02-27 DIAGNOSIS — R19.5 ABNORMAL STOOL TEST: ICD-10-CM

## 2025-02-27 DIAGNOSIS — K22.9 IRREGULAR Z LINE OF ESOPHAGUS: Primary | ICD-10-CM

## 2025-02-27 DIAGNOSIS — K59.00 CONSTIPATION, UNSPECIFIED CONSTIPATION TYPE: ICD-10-CM

## 2025-02-27 DIAGNOSIS — K22.70 BARRETT'S ESOPHAGUS WITHOUT DYSPLASIA: ICD-10-CM

## 2025-02-27 PROCEDURE — 3074F SYST BP LT 130 MM HG: CPT | Performed by: INTERNAL MEDICINE

## 2025-02-27 PROCEDURE — 99204 OFFICE O/P NEW MOD 45 MIN: CPT | Performed by: INTERNAL MEDICINE

## 2025-02-27 PROCEDURE — 3078F DIAST BP <80 MM HG: CPT | Performed by: INTERNAL MEDICINE

## 2025-02-27 PROCEDURE — 3008F BODY MASS INDEX DOCD: CPT | Performed by: INTERNAL MEDICINE

## 2025-02-27 RX ORDER — LITHIUM CARBONATE 600 MG/1
600 CAPSULE ORAL
COMMUNITY
Start: 2025-02-08

## 2025-02-27 RX ORDER — OMEPRAZOLE 20 MG/1
20 CAPSULE, DELAYED RELEASE ORAL EVERY MORNING
Qty: 90 CAPSULE | Refills: 3 | Status: SHIPPED | OUTPATIENT
Start: 2025-02-27

## 2025-03-06 ENCOUNTER — LAB ENCOUNTER (OUTPATIENT)
Dept: LAB | Age: 60
End: 2025-03-06
Attending: Other
Payer: COMMERCIAL

## 2025-03-06 DIAGNOSIS — K22.70 BARRETT'S ESOPHAGUS WITHOUT DYSPLASIA: ICD-10-CM

## 2025-03-06 DIAGNOSIS — F42.9 OBSESSIVE COMPULSIVE DISORDER: ICD-10-CM

## 2025-03-06 DIAGNOSIS — F31.81 BIPOLAR II DISORDER (HCC): ICD-10-CM

## 2025-03-06 LAB
ANION GAP SERPL CALC-SCNC: 10 MMOL/L (ref 0–18)
BUN BLD-MCNC: 15 MG/DL (ref 9–23)
CALCIUM BLD-MCNC: 10.2 MG/DL (ref 8.7–10.6)
CHLORIDE SERPL-SCNC: 105 MMOL/L (ref 98–112)
CO2 SERPL-SCNC: 27 MMOL/L (ref 21–32)
CREAT BLD-MCNC: 0.97 MG/DL
EGFRCR SERPLBLD CKD-EPI 2021: 67 ML/MIN/1.73M2 (ref 60–?)
FASTING STATUS PATIENT QL REPORTED: NO
GLUCOSE BLD-MCNC: 202 MG/DL (ref 70–99)
LITHIUM SERPL-SCNC: 0.5 MMOL/L (ref 0.6–1.2)
OSMOLALITY SERPL CALC.SUM OF ELEC: 301 MOSM/KG (ref 275–295)
POTASSIUM SERPL-SCNC: 4.3 MMOL/L (ref 3.5–5.1)
SODIUM SERPL-SCNC: 142 MMOL/L (ref 136–145)

## 2025-03-06 PROCEDURE — 36415 COLL VENOUS BLD VENIPUNCTURE: CPT

## 2025-03-06 PROCEDURE — 80178 ASSAY OF LITHIUM: CPT

## 2025-03-06 PROCEDURE — 80048 BASIC METABOLIC PNL TOTAL CA: CPT

## 2025-03-24 RX ORDER — SEMAGLUTIDE 1.34 MG/ML
1 INJECTION, SOLUTION SUBCUTANEOUS WEEKLY
Qty: 3 ML | Refills: 0 | Status: SHIPPED | OUTPATIENT
Start: 2025-03-24

## 2025-03-24 NOTE — TELEPHONE ENCOUNTER
Requested Prescriptions     Pending Prescriptions Disp Refills    OZEMPIC, 1 MG/DOSE, 4 MG/3ML Subcutaneous Solution Pen-injector [Pharmacy Med Name: Ozempic 4 Mg/3ml Inj Radha] 3 mL 0     Sig: INJECT 1 MG INTO THE SKIN ONCE A WEEK.     Future Appointments   Date Time Provider Department Center   4/7/2025  9:45 AM Mecca Ann APRN [facility]     Your appointments       Date & Time Appointment Department (Center)    Apr 07, 2025 9:45 AM CDT Follow Up Visit with Mecca Ann APRN [facility] ([facility] [place])    Contact your primary care provider if your insurance requires a referral.    Please arrive 15 minutes prior to your scheduled appointment. Be sure to bring your current Insurance card, Photo ID, and medication bottles or a list of your current medications.      A 24 hour notice is required to cancel any appointment or you may be charged a $40 No Show Fee.     Important: 24 hour notice is required to cancel any appointment or you may be charged a $40 No Show Fee. Please notify your physician office.               [facility]  [facility]G DIABET[facility] [place] [address] [address], [address] [address] [phone]          Last A1c value was 6.8% done 10/24/2024.  Last OV:11/05/2024  Last refill:02/19/2025

## 2025-04-07 ENCOUNTER — OFFICE VISIT (OUTPATIENT)
Facility: CLINIC | Age: 60
End: 2025-04-07
Payer: COMMERCIAL

## 2025-04-07 ENCOUNTER — NURSE ONLY (OUTPATIENT)
Dept: INTERNAL MEDICINE CLINIC | Facility: CLINIC | Age: 60
End: 2025-04-07
Payer: COMMERCIAL

## 2025-04-07 VITALS
DIASTOLIC BLOOD PRESSURE: 64 MMHG | RESPIRATION RATE: 17 BRPM | WEIGHT: 148.19 LBS | BODY MASS INDEX: 25 KG/M2 | SYSTOLIC BLOOD PRESSURE: 120 MMHG

## 2025-04-07 DIAGNOSIS — Z79.4 TYPE 2 DIABETES MELLITUS WITH HYPERGLYCEMIA, WITH LONG-TERM CURRENT USE OF INSULIN (HCC): Primary | ICD-10-CM

## 2025-04-07 DIAGNOSIS — E11.65 TYPE 2 DIABETES MELLITUS WITH HYPERGLYCEMIA, WITH LONG-TERM CURRENT USE OF INSULIN (HCC): ICD-10-CM

## 2025-04-07 DIAGNOSIS — E11.65 TYPE 2 DIABETES MELLITUS WITH HYPERGLYCEMIA, WITH LONG-TERM CURRENT USE OF INSULIN (HCC): Primary | ICD-10-CM

## 2025-04-07 DIAGNOSIS — I10 PRIMARY HYPERTENSION: ICD-10-CM

## 2025-04-07 DIAGNOSIS — E78.2 MIXED HYPERLIPIDEMIA: ICD-10-CM

## 2025-04-07 DIAGNOSIS — Z79.4 TYPE 2 DIABETES MELLITUS WITH HYPERGLYCEMIA, WITH LONG-TERM CURRENT USE OF INSULIN (HCC): ICD-10-CM

## 2025-04-07 LAB
CREAT UR-SCNC: 56 MG/DL
HEMOGLOBIN A1C: 6.6 % (ref 4.3–5.6)
MICROALBUMIN UR-MCNC: <0.3 MG/DL

## 2025-04-07 PROCEDURE — 99214 OFFICE O/P EST MOD 30 MIN: CPT | Performed by: NURSE PRACTITIONER

## 2025-04-07 PROCEDURE — 3078F DIAST BP <80 MM HG: CPT | Performed by: NURSE PRACTITIONER

## 2025-04-07 PROCEDURE — 3074F SYST BP LT 130 MM HG: CPT | Performed by: NURSE PRACTITIONER

## 2025-04-07 PROCEDURE — 82043 UR ALBUMIN QUANTITATIVE: CPT | Performed by: NURSE PRACTITIONER

## 2025-04-07 PROCEDURE — 95251 CONT GLUC MNTR ANALYSIS I&R: CPT | Performed by: NURSE PRACTITIONER

## 2025-04-07 PROCEDURE — 82570 ASSAY OF URINE CREATININE: CPT | Performed by: NURSE PRACTITIONER

## 2025-04-07 PROCEDURE — 83036 HEMOGLOBIN GLYCOSYLATED A1C: CPT | Performed by: NURSE PRACTITIONER

## 2025-04-07 RX ORDER — LITHIUM CARBONATE 300 MG/1
300 CAPSULE ORAL
COMMUNITY
Start: 2025-01-08

## 2025-04-07 RX ORDER — NORTRIPTYLINE HYDROCHLORIDE 10 MG/1
10 CAPSULE ORAL NIGHTLY
COMMUNITY
Start: 2025-02-28

## 2025-04-07 RX ORDER — LITHIUM CARBONATE 300 MG
TABLET ORAL
COMMUNITY
Start: 2025-04-03 | End: 2025-04-07

## 2025-04-07 RX ORDER — SEMAGLUTIDE 1.34 MG/ML
1 INJECTION, SOLUTION SUBCUTANEOUS WEEKLY
Qty: 3 ML | Refills: 5 | Status: SHIPPED | OUTPATIENT
Start: 2025-04-07

## 2025-04-07 RX ORDER — DAPAGLIFLOZIN 10 MG/1
10 TABLET, FILM COATED ORAL DAILY
Qty: 90 TABLET | Refills: 1 | Status: SHIPPED | OUTPATIENT
Start: 2025-04-07

## 2025-04-07 NOTE — PROGRESS NOTES
-----------------------------  Dexcom Clarity  -----------------------------  Vero Michel    YOB: 1965    Generated at: Mon, Apr 7, 2025 10:02 AM CDT    Reporting period: Sun Mar 9, 2025 - Mon Apr 7, 2025  -----------------------------  Glucose Details    Average glucose: 161 mg/dL    GMI: 7.2%    Standard deviation: 30 mg/dL    Coefficient of Variation: 18.6%  -----------------------------  Time in Range    Very High: 1%    High: 22%    In Range: 77%    Low: 0%    Very Low: 0%    Target Range   mg/dL    -----------------------------  Sensor usage    Days with data: 26/30    Time active: 92%    Avg. calibrations per day: 0.0

## 2025-04-07 NOTE — PROGRESS NOTES
Vero Michel is a 59 year old presenting today to establish w me  for type 2 diabetes management.   Primary care physician: HEATHER RIVERA DO  Last Diabetes appointment with me  --> started Ozempic ; has  been holding Tresiba insulin 8 units daily since 2-   Father has been sick and now in rehab. A lot of stress and eating on the go.   Has lost 4# since starting ozempic  NO GI side effects.       Changed from katya to Dexcom g7    Today's A1C 6.6% ( last A1C 6.8%)       Reviewed CGM report/trends w patient today:  Dexcom  (full download in media)   Sensor active time:  891.7  %    4 week (28-30 days)  GMI 7.3  %    Average glucose: 168  mg/dl     Hypoglycemia 0%    Time in Range 71  %  (ADA recommended goal > 70%)   Variability WNL ( < 33%)            Diabetes History:  Type 2 Diabetes ~ 2008    Patient has not had hospitalizations for blood sugar issues  denies any history of pancreatitis      Previous DM therapies:  glimepiride, Januvia: change in rx   Jardiance - mycotic infection   Pioglitazone :     Metformin + loss of appetite   Trulicity  lack of control   Tresiba 2-2025: improved after starting Ozempic     Current DM Regimen:  Farxiga 10mg once daily   Ozempic 1.0mg subcutaneous once weekly     NOVOLOG  sliding scale: three times daily at meals   180-220 mg/dl = 2 units  220-260 mg/dl = 3 units  261-300 mg/dl = 4 units  Over  301 mg/dl = 5 units     HGBA1C:    Lab Results   Component Value Date    A1C 6.6 (A) 04/07/2025    A1C 6.8 (H) 10/24/2024    A1C 7.4 (H) 05/16/2024     (H) 10/24/2024       Lab Results   Component Value Date    CHOLEST 229 (H) 10/24/2024    CHOLEST 138 05/16/2024    TRIG 215 (H) 10/24/2024    TRIG 64 05/16/2024    HDL 32 (L) 10/24/2024    HDL 42 05/16/2024     (H) 10/24/2024    LDL 83 05/16/2024     Lab Results   Component Value Date    MICROALBCREA 9.3 10/24/2024    MICROALBCREA 9.8 05/16/2024      Lab Results   Component Value Date     CREATSERUM 0.84 04/04/2025    CREATSERUM 0.97 03/06/2025    EGFRCR 80 04/04/2025    EGFRCR 67 03/06/2025     Lab Results   Component Value Date    AST 22 10/24/2024    AST 32 05/16/2024    ALT 19 10/24/2024    ALT 36 05/16/2024       Lab Results   Component Value Date    TSH 0.770 10/24/2024    TSH 1.030 09/25/2023    T4F 0.8 (L) 07/30/2018         DM Complications:  Microvascular:   Neuropathy: no  Retinopathy: no  Nephropathy: HX Chronic Kidney Disease stage 3     Macrovascular:  PVD: no  CAD: no  Stroke/CVA: no        Modifying factors:  Medication adherence: yes   Barriers: none    Recent steroids, illness or infections ( past 3m): no     Allergies: Topiramate    Past Medical History:    Anxiety    Arthritis    Back pain    Constipation    Depression    Diabetes (HCC)    Diabetes mellitus (HCC)    Esophageal reflux    Fatigue    Flatulence/gas pain/belching    Frequent use of laxatives    Med complication    H/O degenerative disc disease    Headache disorder    Migraine    High cholesterol    History of depression    History of mental disorder    Hx of motion sickness    Hyperlipidemia    Hypertension    Impaired vision    Lichen sclerosus    Loss of appetite    Lumbar disc herniation    Migraines    Nausea    Periodic with migraines    Nausea and/or vomiting    Osteoarthritis    Pain in joints    Pain with bowel movements    Problems with swallowing    S/P hip replacement, left    Sleep apnea    Sleep disturbance    CPAP    Stress    Type 2 diabetes mellitus (HCC)    Uncomfortable fullness after meals    Migraines no appitite    Visual impairment    glasses for distance    Vomiting    Multiple times daily feb/march lessened and now none    Wears glasses    Weight loss     Past Surgical History:   Procedure Laterality Date    Biopsy  02/2024    Labia    Colonoscopy  01/01/2007    Evaluate only, bladder (dmg)  2007    Hip replacement surgery  2019    Hip surgery Left 10/21/2019    THR    Other surgical history       SLING (Bladder)    Other surgical history  2010    bladder sling at [facility],    Tonsillectomy       Social History     Socioeconomic History    Marital status:     Number of children: 4   Tobacco Use    Smoking status: Never     Passive exposure: Never    Smokeless tobacco: Never   Vaping Use    Vaping status: Never Used   Substance and Sexual Activity    Alcohol use: Yes     Comment: 1 every couple months    Drug use: No   Other Topics Concern    Caffeine Concern No    Stress Concern Yes    Weight Concern No    Special Diet Yes    Exercise No    Seat Belt Yes     Social Drivers of Health     Food Insecurity: No Food Insecurity (7/18/2024)    Received from [facility]    Hunger Vital Sign     Worried About Running Out of Food in the Last Year: Never true     Ran Out of Food in the Last Year: Never true   Transportation Needs: No Transportation Needs (7/18/2024)    Received from [facility]    PRAPARE - Transportation     Lack of Transportation (Medical): No     Lack of Transportation (Non-Medical): No   Housing Stability: Low Risk  (7/18/2024)    Received from [facility]    Housing Stability Vital Sign     Unable to Pay for Housing in the Last Year: No     Number of Places Lived in the Last Year: 1     Unstable Housing in the Last Year: No     Family History   Problem Relation Age of Onset    Prostate Cancer Father     Cancer Father 73        prostate    Hypertension Mother     Psychiatric Mother     Migraines Daughter     Diabetes Maternal Grandfather     Breast Cancer Paternal Grandmother 50    Hypertension Sister     Lipids Other     Hypertension Other      Current Medication List:   Current Outpatient Medications   Medication Sig Dispense Refill    nortriptyline 10 MG Oral Cap Take 1 capsule (10 mg total) by mouth nightly.      lithium carbonate 300 MG Oral Cap Take 1 capsule (300 mg total) by mouth After dinner.      semaglutide (OZEMPIC,  1 MG/DOSE,) 4 MG/3ML Subcutaneous Solution Pen-injector Inject 1 mg into the skin once a week. 3 mL 5    dapagliflozin (FARXIGA) 10 MG Oral Tab Take 1 tablet (10 mg total) by mouth daily. 90 tablet 1    lithium carbonate 600 MG Oral Cap Take 1 capsule (600 mg total) by mouth After dinner.      omeprazole 20 MG Oral Capsule Delayed Release Take 1 capsule (20 mg total) by mouth every morning. 90 capsule 3    lamoTRIgine 200 MG Oral Tab Take 1 tablet (200 mg total) by mouth 2 (two) times daily. 60 tablet 3    rosuvastatin 20 MG Oral Tab Take 1 tablet (20 mg total) by mouth nightly. 90 tablet 3    insulin aspart (NOVOLOG FLEXPEN) 100 Units/mL Subcutaneous Solution Pen-injector INJECT 3X/DAY WITH MEALS AS DIRECTED UP TO TOTAL DAILY DOSE = 20 UNITS 15 mL 0    Continuous Glucose Sensor (DEXCOM G7 SENSOR) Does not apply Misc 1 each Every 10 days. Use as directed every 10 days 3 each 11    ALPRAZolam 1 MG Oral Tab Take 1 tablet (1 mg total) by mouth 2 (two) times daily as needed for Sleep or Anxiety. 60 tablet 3    ketoconazole 2 % External Cream Apply 1 Application topically daily as needed. If symptoms can apply to affected area daily for 2-3 weeks at a time. If not improving after 2-3 weeks please contact physician 30 g 2    Glucose Blood (CONTOUR NEXT TEST) In Vitro Strip Use as directed 100 each 2    ondansetron (ZOFRAN) 4 mg tablet TAKE 1 TABLET BY MOUTH TO BE TAKEN WITH THE FIRST DOSE OF MIGRAINE MEDICATION 30 tablet 2    nystatin 100,000 Units/g External Cream ORDER IS FOR NYSTATIN SUPPOSITORY 100,000 UNITS IN VAGINA NIGHTLY FOR 30 DAYS - WILL FAX ORDER TO [facility] 30 each 0    FLUCYTOSINE Does not apply Powder 16% flucytosine cream, apply 5g nightly for 14 days (Patient not taking: Reported on 8/26/2024) 1 each 0    Insulin Pen Needle (BD PEN NEEDLE ARCHANA U/F) 32G X 4 MM Does not apply Misc Inject 1 Pen into the skin 4 (four) times daily. Use to inject insulin 4x/day as directed 200 each 3    estradiol 0.1  MG/GM Vaginal Cream 1 gram per vagina nightly for 2 weeks, then 1 gram per vagina three times per week for at least 10 weeks. May taper to 0.5 grams (pea-sized amount) nightly thereafter. 1 each 3    Eletriptan Hydrobromide 40 MG Oral Tab use at onset; may repeat once after 4 hours- ONLY 2 IN 24 HOUR PERIOD MAX.  This is a 30 day supply. 8 tablet 11    dicyclomine 10 MG Oral Cap Take 1 capsule (10 mg total) by mouth 3 (three) times daily as needed (abd cramping). (Patient not taking: Reported on 2/27/2025) 90 capsule 1    Microlet Lancets Does not apply Misc Test blood glucose twice daily 200 each 11    Fluticasone Propionate 50 MCG/ACT Nasal Suspension USE 1 SPRAY IN EACH NOSTRIL TWICE A DAY (Patient not taking: Reported on 8/26/2024)             DM associated review of  symptoms:   Endocrine: Polyuria, polyphagia, polydipsia: no  Neurological: Paresthesias: no  + balance issues, memory issues   HEENT: Blurred vision: no  Skin: no rash or wounds  Hematological: Hypoglycemia: no      Review of Systems     LUNGS: denies shortness of breath   CARDIOVASCULAR: denies chest pain  GI: denies abdominal pain, nausea or diarrhea   : denies dysuria        Physical exam:  /64   Resp 17   Wt 148 lb 3.2 oz (67.2 kg)   LMP 10/21/2012 (LMP Unknown)   BMI 25.44 kg/m²   Body mass index is 25.44 kg/m².    Physical Exam   Vitals reviewed.  Constitutional: Normal appearance   Cardiovascular: Normal rate , rhythm   Pulmonary/Chest: Effort normal  Neurological: Alert and oriented .   Psychiatric: Normal mood and affect.  Slow speech, some delay in finding words    Musculoskeletal:  Diabetes foot exam:   Good foot hygiene.   Bilateral barefoot skin diabetic exam: normal.  Visualized feet and the appearance : normal.  Bilateral monofilament/sensation of both feet is normal. Vibration to dorsum to the first toe perceived.   Bilateral 2+ pedal pulse pedal pulse exam       Assessment/Plan:    Dyslipidemia:   Last  labs done   --> ordered update today   Continue Rosuvastatin rx        Type 2 diabetes mellitus with hyperglycemia, with long-term current use of insulin (HCC)  A1C: 6.6%  last A1C 6.8%  Weight: 148 lb ( last weight: 152 lb )    Diabetes control is improving.  Needs ongoing management and evaluation  given the chronicity of Diabetes, ongoing medication monitoring and risk for complications     Reviewed with patient health impact associated with high glucose trends and the importance of better glucose control to prevent onset /progression of DM complications.   Ok to stay off tresiba insulin   Discussed using novolog as needed if Blood sugar trends over 180mg/dl     Continue     Ozempic 1.0mg subcutaneous once weekly   Farxiga 10mg once daily     NOVOLOG  sliding scale: three times daily at meals   180-220 mg/dl = 2 units  220-260 mg/dl = 3 units  261-300 mg/dl = 4 units  Over  301 mg/dl = 5 units        Reviewed clinical significance of A1c, adverse effects of suboptimal glucose control, and goals of therapy   Reviewed the A1C test, what the value reflects and the goal for the patient.   Reminded pt on A1C and blood sugar targets (Fasting < 130 and post prandial <180 ) and complications associated with hyperglycemia and uncontrolled DM (on AVS)   Recommended SMBG 2- x daily if not using CGM   Reviewed s/s and treatment of hypoglycemia (on AVS) if dosing novolog  No need for glucagon since minimal insulin needs.     Continue with lifestyle modifications since they have positive impact on diabetes/blood sugars/health (portion control, physical activity, weight loss)   Reinforced timing and adherence with medication, self-monitoring of blood glucose and routine follow up    Recommended for  patient to follow up in Diabetes center to review carb goals, food label reading, and offer further support/guidance with exercise planning and weight loss.   Urine m/alb collected today    Scheduled Diabetes retina camera today     The  patient is asked to return in 5-6 m  but recommended to contact DM clinic sooner if questions or concerns.    The patient indicates understanding of these issues and agrees to the plan.      Orders Placed This Encounter    Microalb/Creat Ratio, Random Urine     Standing Status:   Future     Number of Occurrences:   1     Standing Expiration Date:   4/7/2026     Order Specific Question:   Release to patient     Answer:   Immediate    POC Hemoglobin A1C     Order Specific Question:   Release to patient     Answer:   Immediate    Comp Metabolic Panel (14)     Standing Status:   Future     Standing Expiration Date:   4/7/2026    Lipid Panel     Standing Status:   Future     Number of Occurrences:   1     Standing Expiration Date:   4/7/2026    TSH W Reflex To Free T4     Standing Status:   Future     Number of Occurrences:   1     Standing Expiration Date:   4/7/2026     Order Specific Question:   Release to patient     Answer:   Immediate    Diabetic Retinopathy Exam  OU - Both Eyes     Standing Status:   Future     Standing Expiration Date:   4/7/2026    Interpretation code 72 hour glucose monitor    nortriptyline 10 MG Oral Cap     Sig: Take 1 capsule (10 mg total) by mouth nightly.    lithium carbonate 300 MG Oral Cap     Sig: Take 1 capsule (300 mg total) by mouth After dinner.    DISCONTD: Lithium Carbonate 300 MG Oral Tab     Sig: Take 1 tablet by mouth every evening. take with Lithium 600 mg in the evening with dinner    semaglutide (OZEMPIC, 1 MG/DOSE,) 4 MG/3ML Subcutaneous Solution Pen-injector     Sig: Inject 1 mg into the skin once a week.     Dispense:  3 mL     Refill:  5    dapagliflozin (FARXIGA) 10 MG Oral Tab     Sig: Take 1 tablet (10 mg total) by mouth daily.     Dispense:  90 tablet     Refill:  1       Diabetes complications & risks surveillance:   A1C/Blood pressure: as reported    Last dilated eye exam: No data recorded Exam shows retinopathy? No data recorded  Last diabetic foot exam: Last Foot  Exam: 04/07/25  Nephropathy screening:   No indication for ace /arb rx.    Lab Results   Component Value Date    EGFRCR 80 04/04/2025    MICROALBCREA 9.3 10/24/2024     LIPID screening:    Lab Results   Component Value Date    CHOLEST 229 (H) 10/24/2024     (H) 10/24/2024    TRIG 215 (H) 10/24/2024    HDL 32 (L) 10/24/2024    FASTING Yes 04/04/2025     Cholesterol Lowering Medications            rosuvastatin 20 MG Oral Tab                   Note to patient: The 21 Century Cures Act makes medical notes like these available to patients in the interest of transparency. However, be advised this is a medical document. It is intended as peer to peer communication. It is written in medical language and may contain abbreviations or verbiage that are unfamiliar. It may appear blunt or direct. Medical documents are intended to carry relevant information, facts as evident, and the clinical opinion of the practitioner.

## 2025-05-21 ENCOUNTER — PATIENT MESSAGE (OUTPATIENT)
Facility: CLINIC | Age: 60
End: 2025-05-21

## 2025-05-21 NOTE — TELEPHONE ENCOUNTER
Per patient request   Reviewed dexcom- trends slightly increased.      While waiting for insurance, can offer farxiga 30 d voucher (1 x use ) so she can stay on medication     Available online at [employer] - but here is coupon she can use at pharmacy     My chart message sent

## 2025-05-21 NOTE — TELEPHONE ENCOUNTER
Patient advised out of Farxiga prescription as of today, submitted information to insurance to resolve issue, but out of pocket cost too high at this time. Will make adjustments as needed or await insurance issue to be resolved. Has had high spikes at times (see triage information).     Hyperglycemia triage:   Confirm current Diabetes medications with patient (not from chart note)   Ozempic 1.0mg subcutaneous once weekly   Farxiga 10mg once daily  --> patient out of supply as of today, awaiting insurance update  NOVOLOG  sliding scale: three times daily at meals --> does not really take as trends have been under 180 mg/dL   180-220 mg/dl = 2 units  220-260 mg/dl = 3 units  261-300 mg/dl = 4 units  Over  301 mg/dl = 5 units    Onset, frequency and duration of symptoms?   Some irritability, but also likely related to stress with father and mood stabilizing medications with psychiatrist.   Recent illness or steroid prescription/injection?   None at this time.   Obtain Blood sugar readings: BG log or CGM?  Vero Michel    YOB: 1965    Generated at: Wed, May 21, 2025 12:12 PM CDT    Reporting period: Thu May 8, 2025 - Wed May 21, 2025  Glucose Details    Average glucose: 176 mg/dL    GMI: 7.5%    Standard deviation: 31 mg/dL    Coefficient of Variation: 17.5%  Time in Range    Very High: 2%    High: 35%    In Range: 63%    Low: 0%    Very Low: 0%  Target Range   mg/dL  Sensor usage    Days with data: 12/14    Time active: 88%    Avg. calibrations per day: 0.0  Changes in diet? Changes in any medications?   Occasional spikes with food, sweets very occasionally.

## 2025-06-06 ENCOUNTER — TELEPHONE (OUTPATIENT)
Facility: CLINIC | Age: 60
End: 2025-06-06

## 2025-06-06 ENCOUNTER — PATIENT MESSAGE (OUTPATIENT)
Facility: CLINIC | Age: 60
End: 2025-06-06

## 2025-06-06 NOTE — TELEPHONE ENCOUNTER
Received fax to complete PA for Ozempic     Key: [id number]    Sent to plan  Awaiting response    [facility] is reviewing your PA request and will respond within 24 hours for Medicaid or up to 72 hours for non-Medicaid plans, based on the required timeframe determined by state or federal regulations. To check for an update later, open this request from your dashboard.

## 2025-06-06 NOTE — TELEPHONE ENCOUNTER
Patient advised was having readings in 100's mg/dL - not bad readings, just unusual for patient. Patient off of Dexcom - will be putting one she found on today- awaiting shipment from [employer].     Hyperglycemia triage:   Confirm current Diabetes medications with patient (not from chart note)   Ozempic 1.0mg subcutaneous once weekly   Farxiga 10mg once daily   NOVOLOG  sliding scale: three times daily at meals   180-220 mg/dl = 2 units  220-260 mg/dl = 3 units  261-300 mg/dl = 4 units  Over  301 mg/dl = 5 units    Onset, frequency and duration of symptoms?   Flu-like symptoms related to medication reaction.   Recent illness or steroid prescription/injection?   Patient having flu-like reaction to new medication - Vrylar  Obtain Blood sugar readings: BG log or CGM?  Dexcom streaming, data only available until Monday, 6/2 - patient noted lower readings for a bit, but back to higher trends. 184 mg/dL post prandial as of now.   Vero Michel    YOB: 1965    Generated at: Fri, Jun 6, 2025 1:14 PM CDT    Reporting period: Tue May 20, 2025 - Mon Jun 2, 2025  Glucose Details    Average glucose: 183 mg/dL    GMI: 7.7%    Standard deviation: 29 mg/dL    Coefficient of Variation: 16.1%  Time in Range    Very High: 3%    High: 44%    In Range: 53%    Low: 0%    Very Low: 0%  Target Range   mg/dL  Sensor usage    Days with data: 13/14    Time active: 91%    Avg. calibrations per day: 0.0  Changes in diet? Changes in any medications?   Patient advised she had not been tolerating much - did have a small banana and peanut butter toast. Patient medication Seroqeul was changed to Vrylar - patient having reaction to medication.

## 2025-06-06 NOTE — TELEPHONE ENCOUNTER
Accidentally answered patient is taking other GLP1s with ozempic    Contacted [facility] to inform and resolve    [facility] sent over form to complete and requested office visit notes    Faxed over [phone]

## 2025-06-09 ENCOUNTER — NURSE ONLY (OUTPATIENT)
Facility: CLINIC | Age: 60
End: 2025-06-09
Payer: COMMERCIAL

## 2025-06-09 RX ORDER — SEMAGLUTIDE 0.68 MG/ML
INJECTION, SOLUTION SUBCUTANEOUS
Qty: 3 ML | Refills: 0 | COMMUNITY
Start: 2025-06-09

## 2025-06-09 NOTE — TELEPHONE ENCOUNTER
Advised patient via My Chart Message - discussed with provider to temporarily decrease Ozempic dosing while working with insurance on approval. May need to call insurance again today for status/update on appeal.

## 2025-06-09 NOTE — TELEPHONE ENCOUNTER
Patient send My Chart Message - found in date Tresiba in fridge - patient to use while awaiting appeal? See triage below:     Patient did also mention possibly trying Trulicity   Hyperglycemia triage:   Confirm current Diabetes medications with patient (not from chart note)   Ozempic 1.0mg subcutaneous once weekly -- 1st missed dose on Thursday   Farxiga 10mg once daily -- was out for a couple weeks related to insurance, but on schedule  NOVOLOG  sliding scale: three times daily at meals   180-220 mg/dl = 2 units  220-260 mg/dl = 3 units  261-300 mg/dl = 4 units  Over  301 mg/dl = 5 units    Onset, frequency and duration of symptoms?   None at this time  Recent illness or steroid prescription/injection?   Patient was having flu-like reaction to newer medication - working with prescribing provider.   Obtain Blood sugar readings: BG log or CGM?  Patient woke up this morning with reading at 180 mg/dL -  Vero Anand    YOB: 1965    Generated at: Mon, Jun 9, 2025 8:51 AM CDT    Reporting period: Tue May 27, 2025 - Mon Jun 9, 2025  Glucose Details    Average glucose: 187 mg/dL    GMI: 7.8%    Standard deviation: 30 mg/dL    Coefficient of Variation: 16.2%  Time in Range    Very High: 5%    High: 45%    In Range: 50%    Low: 0%    Very Low: 0%  Target Range   mg/dL  Sensor usage    Days with data: 9/14    Time active: 86%    Avg. calibrations per day: 0.0  Changes in diet? Changes in any medications?     Per previous note - patient changed from Seroquel to Vrylar every other day - patient was having flu-like reaction.

## 2025-06-09 NOTE — TELEPHONE ENCOUNTER
Ozempic sample set aside for patient while awaiting insurance issues - may need to try calling insurance later today if no response received for appeal. Sample set aside and signed out.

## 2025-06-09 NOTE — TELEPHONE ENCOUNTER
Reviewed CGM report/trends w patient   Dexcom (full download in media)   Missing ozempic - awaiting insurance approval     Ok to provide Ozempic sample ; ok for sample until we can get ozempic 1 mg prescription approved.       Sensor active time: 86 %    2 week  GMI 7.8 %    Average glucose: 187 mg/dl     Hypoglycemia 0%    Time in Range  50 %  (ADA recommended goal > 70%)   Variability WNL ( < 33%)       Continue      Farxiga 10mg once daily      NOVOLOG  sliding scale: three times daily at meals   180-220 mg/dl = 2 units  220-260 mg/dl = 3 units  261-300 mg/dl = 4 units  Over  301 mg/dl = 5 units

## 2025-06-10 NOTE — TELEPHONE ENCOUNTER
Call placed to [facility] to see if they are able to advise on status of appeal - automated line states that Prior Authorization could not be approved and details have been sent to office - not able to speak to person.     Tried again - phone was answered by Ximena, but not able to speak due to incoming call -

## 2025-06-10 NOTE — TELEPHONE ENCOUNTER
Call placed to [facility] - went through automated line and able to speak to Rem - advised that prescription is now in clinician review - turn around time of 6-10 days. Patient and prescriber will be notified of decision.

## 2025-06-16 NOTE — TELEPHONE ENCOUNTER
Received fax from [employer] regarding PA    [facility]

## 2025-06-25 ENCOUNTER — PATIENT MESSAGE (OUTPATIENT)
Facility: CLINIC | Age: 60
End: 2025-06-25

## 2025-06-25 NOTE — TELEPHONE ENCOUNTER
Dexcom report downloaded for APRN review. Awaiting patient response.      Hyperglycemia triage:   Confirm current Diabetes medications with patient (not from chart note)   Onset, frequency and duration of symptoms?   Recent illness or steroid prescription/injection?   Obtain Blood sugar readings: BG log or CGM?  Changes in diet? Changes in any medications?

## 2025-06-26 ENCOUNTER — TELEMEDICINE (OUTPATIENT)
Facility: CLINIC | Age: 60
End: 2025-06-26
Payer: COMMERCIAL

## 2025-06-26 ENCOUNTER — NURSE ONLY (OUTPATIENT)
Facility: CLINIC | Age: 60
End: 2025-06-26
Payer: COMMERCIAL

## 2025-06-26 DIAGNOSIS — Z79.4 TYPE 2 DIABETES MELLITUS WITH HYPERGLYCEMIA, WITH LONG-TERM CURRENT USE OF INSULIN (HCC): Primary | ICD-10-CM

## 2025-06-26 DIAGNOSIS — E11.65 TYPE 2 DIABETES MELLITUS WITH HYPERGLYCEMIA, WITH LONG-TERM CURRENT USE OF INSULIN (HCC): Primary | ICD-10-CM

## 2025-06-26 DIAGNOSIS — E78.2 MIXED HYPERLIPIDEMIA: ICD-10-CM

## 2025-06-26 PROCEDURE — 98006 SYNCH AUDIO-VIDEO EST MOD 30: CPT | Performed by: NURSE PRACTITIONER

## 2025-06-26 PROCEDURE — 95251 CONT GLUC MNTR ANALYSIS I&R: CPT | Performed by: NURSE PRACTITIONER

## 2025-06-26 RX ORDER — INSULIN DEGLUDEC 100 U/ML
INJECTION, SOLUTION SUBCUTANEOUS
COMMUNITY
Start: 2025-06-26

## 2025-06-26 RX ORDER — INSULIN DEGLUDEC 100 U/ML
INJECTION, SOLUTION SUBCUTANEOUS
Qty: 3 ML | Refills: 0 | COMMUNITY
Start: 2025-06-26

## 2025-06-26 NOTE — TELEPHONE ENCOUNTER
Provider per VV today for 9:45 am possible will try and get a hold of patient by 8 am     LEFT VOICEMAIL TO CALL BACK

## 2025-06-26 NOTE — PROGRESS NOTES
Telehealth visit: Please note that the following visit was completed using two-way, real-time interactive audio and video communication.  Time Spent:  12 min     Vero Michel is a 60 year old presenting today for type 2 diabetes management.   Primary care physician: HEATHER RIVERA,   Last Diabetes appointment with me 4-2025     Increase in Blood sugar trends per dexcom    Had prescription delay w Ozempic since PA needed detailed info   She was dosing every 10 days for Ozempic while waiting for ozempic approval   Has a lot of stress. Father passed away. [facility] services were yesterday.   Prior to this, she has noticed increase in Blood sugar trends; messaged me yesterday requesting change in prandial insulin doses   Had urgent low alert on 6- at 1-2 am however tracing appears to be \"compression\"  hypoglycemia - see below     Has been off tresiba since 2-2025 ; (dose 10 units )     Wearing Dexcom g7   Most recent lab A1C 6.6% ( last A1C 6.8%)   However GMI past 2 weeks: 7.9%   Admits giving + 1 extra insulin per scale doses  Trends increase even if NOT eating         Reviewed CGM report/trends w patient today:  Dexcom  (full download in media)   Sensor active time:  88%    Average glucose: 193 mg/dl ( last appointment : 168  mg/dl )    Hypoglycemia 0%    Time in Range  40  %  (ADA recommended goal > 70%) ( last upload 70%)   Variability WNL ( < 33%)                  Diabetes History:  Type 2 Diabetes ~ 2008    Patient has not had hospitalizations for blood sugar issues  denies any history of pancreatitis      Previous DM therapies:  glimepiride, Januvia: change in rx   Jardiance - mycotic infection   Pioglitazone :     Metformin + loss of appetite   Trulicity  lack of control   Tresiba 2-2025: improved after starting Ozempic     Current DM Regimen:  Farxiga 10mg once daily   Ozempic 1.0mg subcutaneous once weekly     NOVOLOG  sliding scale: three times daily at meals   180-220 mg/dl = 2  units  220-260 mg/dl = 3 units  261-300 mg/dl = 4 units  Over  301 mg/dl = 5 units     HGBA1C:    Lab Results   Component Value Date    A1C 6.6 (A) 04/07/2025    A1C 6.8 (H) 10/24/2024    A1C 7.4 (H) 05/16/2024     (H) 10/24/2024       Lab Results   Component Value Date    CHOLEST 111 04/04/2025    CHOLEST 229 (H) 10/24/2024    TRIG 109 04/04/2025    TRIG 215 (H) 10/24/2024    HDL 31 (L) 04/04/2025    HDL 32 (L) 10/24/2024    LDL 60 04/04/2025     (H) 10/24/2024     Lab Results   Component Value Date    MICROALBCREA  04/07/2025      Comment:      Unable to calculate due to Urine Microalbumin <0.3 mg/dL        MICROALBCREA 9.3 10/24/2024      Lab Results   Component Value Date    CREATSERUM 0.84 04/04/2025    CREATSERUM 0.97 03/06/2025    EGFRCR 80 04/04/2025    EGFRCR 67 03/06/2025     Lab Results   Component Value Date    AST 21 04/04/2025    AST 22 10/24/2024    ALT 20 04/04/2025    ALT 19 10/24/2024       Lab Results   Component Value Date    TSH 2.987 04/04/2025    TSH 0.770 10/24/2024    T4F 0.8 (L) 07/30/2018         DM Complications:  Microvascular:   Neuropathy: no  Retinopathy: no  Nephropathy: HX Chronic Kidney Disease stage 3     Macrovascular:  PVD: no  CAD: no  Stroke/CVA: no        Modifying factors:  Medication adherence: yes     + stress   Recent steroids, illness or infections ( past 3m): no     Allergies: Topiramate    Past Medical History:    Anxiety    Arthritis    Back pain    Constipation    Depression    Diabetes (HCC)    Diabetes mellitus (HCC)    Esophageal reflux    Fatigue    Flatulence/gas pain/belching    Frequent use of laxatives    Med complication    H/O degenerative disc disease    Headache disorder    Migraine    High cholesterol    History of depression    History of mental disorder    Hx of motion sickness    Hyperlipidemia    Hypertension    Impaired vision    Lichen sclerosus    Loss of appetite    Lumbar disc herniation    Migraines    Nausea    Periodic with  migraines    Nausea and/or vomiting    Osteoarthritis    Pain in joints    Pain with bowel movements    Problems with swallowing    S/P hip replacement, left    Sleep apnea    Sleep disturbance    CPAP    Stress    Type 2 diabetes mellitus (HCC)    Uncomfortable fullness after meals    Migraines no appitite    Visual impairment    glasses for distance    Vomiting    Multiple times daily feb/march lessened and now none    Wears glasses    Weight loss     Past Surgical History:   Procedure Laterality Date    Biopsy  02/2024    Labia    Colonoscopy  01/01/2007    Evaluate only, bladder (dmg)  2007    Hip replacement surgery  2019    Hip surgery Left 10/21/2019    THR    Other surgical history      SLING (Bladder)    Other surgical history  2010    bladder sl[facility],    Tonsillectomy       Social History     Socioeconomic History    Marital status:     Number of children: 4   Tobacco Use    Smoking status: Never     Passive exposure: Never    Smokeless tobacco: Never   Vaping Use    Vaping status: Never Used   Substance and Sexual Activity    Alcohol use: Yes     Comment: 1 every couple months    Drug use: No   Other Topics Concern    Caffeine Concern No    Stress Concern Yes    Weight Concern No    Special Diet Yes    Exercise No    Seat Belt Yes     Social Drivers of Health     Food Insecurity: No Food Insecurity (7/18/2024)    Received from [facility]    Hunger Vital Sign     Worried About Running Out of Food in the Last Year: Never true     Ran Out of Food in the Last Year: Never true   Transportation Needs: No Transportation Needs (7/18/2024)    Received from [facility]    PRAPARE - Transportation     Lack of Transportation (Medical): No     Lack of Transportation (Non-Medical): No   Housing Stability: Low Risk  (7/18/2024)    Received from [facility]    Housing Stability Vital Sign     Unable to Pay for Housing in the Last Year: No      Number of Places Lived in the Last Year: 1     Unstable Housing in the Last Year: No     Family History   Problem Relation Age of Onset    Prostate Cancer Father     Cancer Father 73        prostate    Hypertension Mother     Psychiatric Mother     Migraines Daughter     Diabetes Maternal Grandfather     Breast Cancer Paternal Grandmother 50    Hypertension Sister     Lipids Other     Hypertension Other      Current Medication List:   Current Outpatient Medications   Medication Sig Dispense Refill    insulin degludec (TRESIBA FLEXTOUCH) 100 UNIT/ML Subcutaneous Solution Pen-injector Up to 10 units daily as directed w dose titration      semaglutide (OZEMPIC, 0.25 OR 0.5 MG/DOSE,) 2 MG/3ML Subcutaneous Solution Pen-injector As directed 3 mL 0    nortriptyline 10 MG Oral Cap Take 1 capsule (10 mg total) by mouth nightly.      lithium carbonate 300 MG Oral Cap Take 1 capsule (300 mg total) by mouth After dinner.      semaglutide (OZEMPIC, 1 MG/DOSE,) 4 MG/3ML Subcutaneous Solution Pen-injector Inject 1 mg into the skin once a week. 3 mL 5    dapagliflozin (FARXIGA) 10 MG Oral Tab Take 1 tablet (10 mg total) by mouth daily. 90 tablet 1    lithium carbonate 600 MG Oral Cap Take 1 capsule (600 mg total) by mouth After dinner.      omeprazole 20 MG Oral Capsule Delayed Release Take 1 capsule (20 mg total) by mouth every morning. 90 capsule 3    Continuous Glucose Sensor (DEXCOM G7 SENSOR) Does not apply Misc 1 each Every 10 days. Use as directed every 10 days 3 each 11    ALPRAZolam 1 MG Oral Tab Take 1 tablet (1 mg total) by mouth 2 (two) times daily as needed for Sleep or Anxiety. 60 tablet 3    lamoTRIgine 200 MG Oral Tab Take 1 tablet (200 mg total) by mouth 2 (two) times daily. 60 tablet 3    rosuvastatin 20 MG Oral Tab Take 1 tablet (20 mg total) by mouth nightly. 90 tablet 3    ketoconazole 2 % External Cream Apply 1 Application topically daily as needed. If symptoms can apply to affected area daily for 2-3 weeks at  a time. If not improving after 2-3 weeks please contact physician 30 g 2    Glucose Blood (CONTOUR NEXT TEST) In Vitro Strip Use as directed 100 each 2    ondansetron (ZOFRAN) 4 mg tablet TAKE 1 TABLET BY MOUTH TO BE TAKEN WITH THE FIRST DOSE OF MIGRAINE MEDICATION 30 tablet 2    nystatin 100,000 Units/g External Cream ORDER IS FOR NYSTATIN SUPPOSITORY 100,000 UNITS IN VAGINA NIGHTLY FOR 30 DAYS - WILL FAX ORDER TO [facility] 30 each 0    insulin aspart (NOVOLOG FLEXPEN) 100 Units/mL Subcutaneous Solution Pen-injector INJECT 3X/DAY WITH MEALS AS DIRECTED UP TO TOTAL DAILY DOSE = 20 UNITS 15 mL 0    FLUCYTOSINE Does not apply Powder 16% flucytosine cream, apply 5g nightly for 14 days (Patient not taking: Reported on 8/26/2024) 1 each 0    Insulin Pen Needle (BD PEN NEEDLE ARCHANA U/F) 32G X 4 MM Does not apply Misc Inject 1 Pen into the skin 4 (four) times daily. Use to inject insulin 4x/day as directed 200 each 3    estradiol 0.1 MG/GM Vaginal Cream 1 gram per vagina nightly for 2 weeks, then 1 gram per vagina three times per week for at least 10 weeks. May taper to 0.5 grams (pea-sized amount) nightly thereafter. 1 each 3    Eletriptan Hydrobromide 40 MG Oral Tab use at onset; may repeat once after 4 hours- ONLY 2 IN 24 HOUR PERIOD MAX.  This is a 30 day supply. 8 tablet 11    dicyclomine 10 MG Oral Cap Take 1 capsule (10 mg total) by mouth 3 (three) times daily as needed (abd cramping). (Patient not taking: Reported on 2/27/2025) 90 capsule 1    Microlet Lancets Does not apply Misc Test blood glucose twice daily 200 each 11    Fluticasone Propionate 50 MCG/ACT Nasal Suspension USE 1 SPRAY IN EACH NOSTRIL TWICE A DAY (Patient not taking: Reported on 8/26/2024)             DM associated review of  symptoms:   Endocrine: Polyuria, polyphagia, polydipsia: + polyuria   Neurological: Paresthesias:  NO    HEENT: Blurred vision: no  Skin: no rash or wounds  Hematological: Hypoglycemia: no      Review of Systems   +  STRESS   LUNGS: denies shortness of breath   CARDIOVASCULAR: denies chest pain  GI: denies abdominal pain, nausea constipation or diarrhea   : denies dysuria        Physical exam:  LMP 10/21/2012 (LMP Unknown)   There is no height or weight on file to calculate BMI.    Physical Exam     Constitutional: Normal appearance   Cardiovascular: Not assessed   Pulmonary/Chest: Effort normal  Neurological: Alert and oriented .   Psychiatric: Normal mood and affect.        Assessment/Plan:  Dyslipidemia   Cholesterol: 111, done on 4/4/2025.  HDL Cholesterol: 31, done on 4/4/2025.  TriGlycerides 109, done on 4/4/2025.  LDL Cholesterol: 60, done on 4/4/2025.    Needs ongoing monitoring   Continue Rosuvastatin rx        Type 2 diabetes mellitus with hyperglycemia, with long-term current use of insulin ([facility])  A1C: 6.6%  ( last A1C 6.8%)  Weight: 148 lb ( last weight: 152 lb )  GMI 7.9% on dexcom     Diabetes control is increased per dexcom   Needs ongoing management and evaluation  given the chronicity of Diabetes, ongoing medication monitoring and risk for complications     Reviewed with patient health impact associated with high glucose trends and the importance of better glucose control to prevent onset /progression of DM complications.   Due to increase in trends, appears to need basal insulin again   restart basal , Tresiba U 100 flex touch: 8 units once daily       Continue using novolog as needed if Blood sugar trends over 180mg/dl - per scale     Continue     Ozempic 1.0mg subcutaneous once weekly   Farxiga 10mg once daily     NOVOLOG  sliding scale: three times daily at meals   If Blood sugar is:   180-220 mg/dl = 2 units  220-260 mg/dl = 3 units  261-300 mg/dl = 4 units  Over  301 mg/dl = 5 units    Reviewed clinical significance of A1c, adverse effects of suboptimal glucose control, and goals of therapy   Reviewed the A1C test, what the value reflects and the goal for the patient.   Reminded pt on A1C and blood sugar  targets (Fasting < 130 and post prandial <180 ) and complications associated with hyperglycemia and uncontrolled DM (on AVS)   Recommended SMBG 2- x daily if not using CGM   Reviewed s/s and treatment of hypoglycemia (on AVS) if dosing novolog  No need for glucagon since minimal insulin needs however if she is staying on multiple daily injections, consider prescription     Continue with lifestyle modifications since they have positive impact on diabetes/blood sugars/health (portion control, physical activity)   Reinforced timing and adherence with medication, self-monitoring of blood glucose and routine follow up    The patient is asked to return in 5-6 m  but recommended to contact DM clinic sooner if questions or concerns.    The patient indicates understanding of these issues and agrees to the plan.      Orders Placed This Encounter    insulin degludec (TRESIBA FLEXTOUCH) 100 UNIT/ML Subcutaneous Solution Pen-injector     Sig: Up to 10 units daily as directed w dose titration       Diabetes complications & risks surveillance:   A1C/Blood pressure: as reported    Last dilated eye exam: Last Dilated Eye Exam: 04/07/25   Exam shows retinopathy? Eye Exam shows Diabetic Retinopathy?: No  Last diabetic foot exam: Last Foot Exam: 04/07/25  Nephropathy screening:   No indication for ace /arb rx.    Lab Results   Component Value Date    EGFRCR 80 04/04/2025    MICROALBCREA  04/07/2025      Comment:      Unable to calculate due to Urine Microalbumin <0.3 mg/dL         LIPID screening:    Lab Results   Component Value Date    CHOLEST 111 04/04/2025    LDL 60 04/04/2025    TRIG 109 04/04/2025    HDL 31 (L) 04/04/2025    FASTING Yes 04/04/2025     Cholesterol Lowering Medications            rosuvastatin 20 MG Oral Tab             Note to patient: The 21 Century Cures Act makes medical notes like these available to patients in the interest of transparency. However, be advised this is a medical document. It is intended as peer  to peer communication. It is written in medical language and may contain abbreviations or verbiage that are unfamiliar. It may appear blunt or direct. Medical documents are intended to carry relevant information, facts as evident, and the clinical opinion of the practitioner.       This has been done in good eduardo to provide continuity of care in the best interest of the provider-patient relationship, due to the on-going public health crisis/national emergency and because of restrictions of visitation.  There are limitations of this visit as no or only very limited physical exam could be performed.  Every conscious effort was taken to allow for sufficient and adequate time.  This billing visit was spent on reviewing blood sugar trends, DM related labs, medications and decision making.  Appropriate medical decision-making and tests are ordered as detailed in the plan of care above.      Vero Michel understands phone or video evaluation is not a substitute for face-to-face examination or emergency care. Patient advised to go to ER or call 911 for worsening symptoms or acute distress.     Mecca Ann, APRN

## 2025-06-26 NOTE — PATIENT INSTRUCTIONS
Estimated A1C 7.9% on dexcom   Your trends are most likely increased from the stress.         Start Tresiba 8 units once daily     For now continue the + 1 extra Novolog added to your scale below; by Saturday you should notice your trends improve with the tresiba doses. Resume baseline novolog over the weekend.         Continue     Farxiga 10mg once daily   Ozempic 1.0mg subcutaneous once weekly       NOVOLOG  sliding scale: three times daily at meals   180-220 mg/dl = 2 units  220-260 mg/dl = 3 units  261-300 mg/dl = 4 units  Over  301 mg/dl = 5 units      American Diabetes Association: blood sugar targets:     Fasting blood sugar (before breakfast) Target:    (ideally less than 110)  2 hours after eating less than 180 (ideally less than  150 )     Call for blood sugars less than  75 or greater than  200 more than 2 times in a week     If you are wearing the sensor, please look at your trends/averages    Recommendations:   GMI (estimated A1C ) target under  7%     Time in range (healthy blood sugar targets) : goal is over 70%   less than 70 : goal is less than 4%   Over 180 : goal is less than 20 %   Over 250: goal is  less than 5%     Watch for low blood sugars: (less than 70 )    Treatment of Low Blood Glucose Action Plan  1. Check blood glucose to be sure that it is low. You cannot  always go by symptoms or how you feel. If in doubt, treat your low blood glucose anyway.  Rule of 15 :     2. Take 15 grams of carbohydrate (carb). Here are some choices:    4 oz. regular fruit juice  3-4 glucose tablets  6 oz. regular soda   7-8 jelly beans    3. Recheck blood glucose after 10-15 minutes. If blood glucose is still low (less than 70 mg/dl) repeat the treatment (step 2).    4. If your next meal is more than one hour away, eat a small snack.    5. If you’re not sure what caused your low blood glucose, call your healthcare provider.    6. Always check your blood glucose before you drive       To treat a low, I  recommend you carry with you easy, pre-portioned treatment for low blood sugars that are 15G of carbs:   - Children sized squeeze pouch applesauce (high fiber + carbs help prevent too high of a spike)  - Small children's sized juicebox- 15g carb --> 4oz juice box  - Glucose tablets from [employer], you can find them near diabetes supplies --> Note, you will need to eat 3-4 tablets to get to 15g of carbs  - Children sized fruit snack pack- look for one with 15 grams of total carbohydrate    AVOID complex carbs, or foods that contain fats along with carbs (like chocolate) can slow the absorption of glucose and should NOT be used to treat an emergency low

## 2025-07-02 ENCOUNTER — PATIENT MESSAGE (OUTPATIENT)
Facility: CLINIC | Age: 60
End: 2025-07-02

## 2025-07-13 ENCOUNTER — PATIENT MESSAGE (OUTPATIENT)
Dept: FAMILY MEDICINE CLINIC | Facility: CLINIC | Age: 60
End: 2025-07-13

## 2025-07-13 DIAGNOSIS — G43.711 MIGRAINE, CHRONIC, WITHOUT AURA, INTRACTABLE, WITH STATUS MIGRAINOSUS: Primary | ICD-10-CM

## 2025-07-14 NOTE — TELEPHONE ENCOUNTER
Appointment made for Wednesday, 7/16/25 as mentioned.    Replied back to the [place] message to notify the patient.

## 2025-07-16 ENCOUNTER — TELEPHONE (OUTPATIENT)
Dept: NEUROLOGY | Facility: CLINIC | Age: 60
End: 2025-07-16

## 2025-07-16 ENCOUNTER — TELEPHONE (OUTPATIENT)
Dept: FAMILY MEDICINE CLINIC | Facility: CLINIC | Age: 60
End: 2025-07-16

## 2025-07-16 ENCOUNTER — OFFICE VISIT (OUTPATIENT)
Dept: FAMILY MEDICINE CLINIC | Facility: CLINIC | Age: 60
End: 2025-07-16
Payer: COMMERCIAL

## 2025-07-16 VITALS
WEIGHT: 147 LBS | BODY MASS INDEX: 25.1 KG/M2 | HEIGHT: 64 IN | RESPIRATION RATE: 16 BRPM | TEMPERATURE: 98 F | DIASTOLIC BLOOD PRESSURE: 76 MMHG | SYSTOLIC BLOOD PRESSURE: 122 MMHG | HEART RATE: 80 BPM

## 2025-07-16 DIAGNOSIS — F51.04 PSYCHOPHYSIOLOGICAL INSOMNIA: ICD-10-CM

## 2025-07-16 DIAGNOSIS — G43.711 MIGRAINE, CHRONIC, WITHOUT AURA, INTRACTABLE, WITH STATUS MIGRAINOSUS: Primary | ICD-10-CM

## 2025-07-16 PROCEDURE — 99214 OFFICE O/P EST MOD 30 MIN: CPT | Performed by: FAMILY MEDICINE

## 2025-07-16 RX ORDER — METHYLPREDNISOLONE 4 MG/1
TABLET ORAL
Qty: 1 EACH | Refills: 0 | Status: SHIPPED | OUTPATIENT
Start: 2025-07-16

## 2025-07-16 RX ORDER — TEMAZEPAM 7.5 MG/1
7.5 CAPSULE ORAL NIGHTLY PRN
Qty: 30 CAPSULE | Refills: 0 | Status: SHIPPED | OUTPATIENT
Start: 2025-07-16 | End: 2025-07-21

## 2025-07-16 NOTE — PROGRESS NOTES
The following individual(s) verbally consented to be recorded using ambient AI listening technology and understand that they can each withdraw their consent to this listening technology at any point by asking the clinician to turn off or pause the recording:    Patient name: Vero DANIEL Michel  Additional names:

## 2025-07-16 NOTE — TELEPHONE ENCOUNTER
Called and informed [facility] that patient is to stop Lorazepam and start Temazepam.     [facility] verbalized an understanding.

## 2025-07-16 NOTE — TELEPHONE ENCOUNTER
Pharmacy called to inform of possible drug interaction. States that patient is also taking lorazepam 0.5mg twice a day prescribed by a different provider and would like to make Dr. Melchor aware.  Patient was seen in office today for migraines and was prescribed with temazepam 7.5mg as needed. Pharmacy wants to verify with Dr. Melchor if the patient is ok to take another benzodiazepine or if the prescription needs to be changed.

## 2025-07-16 NOTE — PROGRESS NOTES
[facility] Office Note  Chief Complaint:   Chief Complaint   Patient presents with    Migraine     In the past 2-3 months, struggling with migraines more often and causing sleep issues.        HPI:     History of Present Illness  Sarah Michel is a 60 year old female with chronic migraines who presents with worsening migraine symptoms.    Over the past two to three months, she has experienced worsening migraines, now occurring daily or five times a week. Previously, she was taken off all medications except for eletriptan, which she uses as needed. She reports increased stress, particularly following her father's illness and subsequent death, which she believes has contributed to her worsening symptoms.    She was previously on Botox and Qulipta, which were discontinued by her neurologist. She has been using eletriptan, but due to insurance issues, she only receives six pills a month, which is insufficient for her needs. She has been taking double doses of eletriptan (80 mg) to manage her symptoms, which is not her usual practice.    She reports significant stress related to family dynamics and caregiving responsibilities for her mother, which she believes contributes to her migraine frequency. She also experiences visual disturbances, such as seeing her phone move when looking at her hand, and has difficulty sleeping due to pain.    Her current medications include lamotrigine and lithium, with recent adjustments in dosages. She is no longer on nortriptyline or Seroquel due to insurance changes. For sleep, she has taken her 's trazodone (50 mg) and has used Ativan (0.5 mg twice daily), but finds it ineffective. She has also taken Xanax and CBD/THC products to manage her symptoms.    She is aware of some migraine triggers, including stress, caffeine, chocolate, nitrates, and MSG. She is uncertain about the impact of yogurt and peanut butter on her condition.         Past Medical  History[1]  Past Surgical History[2]  Social History:  Short Social Hx on File[3]  Family History:  Family History[4]  Allergies:  Allergies[5]  Current Meds:  Current Medications[6]   Counseling given: Not Answered       REVIEW OF SYSTEMS:   ROS:  CONSTITUTIONAL:  Denies any unusual weight gain/loss, fever, chills, weakness or fatigue.  HEENT:  Eyes:  Denies visual loss, blurred vision, double vision or yellow sclerae. Ears, Nose, Throat:  Denies hearing loss, sneezing, congestion, runny nose or sore throat.  CARDIOVASCULAR:  Denies chest pain, chest pressure or chest discomfort. No palpitations or edema.  Denies any dyspnea on exertion or at rest  RESPIRATORY:  Denies shortness of breath, cough  GASTROINTESTINAL:  Denies any abdominal pain, + intermittent nausea, denies vomiting  NEUROLOGICAL:  + chronic migraine headache, denies dizziness, syncope, numbness or tingling in the extremities.  MUSCULOSKELETAL:  Denies muscle, back pain, joint pain or stiffness.    EXAM:   /76   Pulse 80   Temp 98 °F (36.7 °C) (Temporal)   Resp 16   Ht 5' 4\" (1.626 m)   Wt 147 lb (66.7 kg)   LMP 10/21/2012 (LMP Unknown)   BMI 25.23 kg/m²  Estimated body mass index is 25.23 kg/m² as calculated from the following:    Height as of this encounter: 5' 4\" (1.626 m).    Weight as of this encounter: 147 lb (66.7 kg).   Vital signs reviewed.Appears stated age, well groomed.  Physical Exam:  GEN:  Patient is alert and oriented x3, no apparent distress  HEAD:  Normocephalic, atraumatic  HEENT:  Eyes: EOMI, PERRLA, no scleral icterus, conjunctivae clear bilaterally.  Ears: TM's clear and visible bilaterally, no excess cerumen or erythema.  Throat:  No tonsillar erythema or exudate.  Mouth:  No oral lesions or ulcerations, no dental abnormalities noted.  LUNGS: clear to auscultation bilaterally, no rales/rhonchi/wheezing  HEART:  Regular rate and rhythm, no murmurs, rubs or gallops  ABDOMEN:  Soft, nondistended, nontender, bowel  sounds normal in all 4 quadrants, no hepatosplenomegaly  EXTREMITIES:  Strength intact with 5/5 bilaterally upper and lower extremities, no edema noted  NEURO:  CN 2 - 12 grossly intact     ASSESSMENT AND PLAN:   1. Migraine, chronic, without aura, intractable, with status migrainosus  - methylPREDNISolone (MEDROL) 4 MG Oral Tablet Therapy Pack; As directed.  Dispense: 1 each; Refill: 0    2. Psychophysiological insomnia  - temazepam 7.5 MG Oral Cap; Take 1 capsule (7.5 mg total) by mouth nightly as needed for Sleep or Anxiety.  Dispense: 30 capsule; Refill: 0      Assessment & Plan  Chronic Migraine  Chronic migraines worsened, likely due to stress and bereavement. Current eletriptan dosage insufficient. Previous treatments included Botox and Qulipta. Insurance issues limit medication access. Awaiting neurologist appointment.  - Discuss with neurologist to adjust migraine management plan.  - Print migraine diet information.  - Discuss potential triggers such as caffeine, nitrates, and MSG.  - Start medrol dose rodrigo    Insomnia  Insomnia likely related to stress and chronic migraine pain. Current Ativan prescription ineffective. Self-medicating with trazodone, Ativan, Xanax, and CBD/THC. Temazepam considered for stress-related insomnia.  - Prescribe temazepam starting at 7.5 mg for insomnia.  - Discuss medication changes with psychiatrist.  - Consider increasing trazodone to 100 mg if needed.    Bipolar Disorder  On lamotrigine and lithium. Recent medication adjustments made, but regimen unclear. Stress and life changes may affect stability. Transitioning to new psychiatrist due to insurance changes.  - Coordinate with new psychiatrist for medication management.  - Ensure continuity of care with new psychiatrist.          Meds & Refills for this Visit:  Requested Prescriptions     Signed Prescriptions Disp Refills    temazepam 7.5 MG Oral Cap 30 capsule 0     Sig: Take 1 capsule (7.5 mg total) by mouth nightly as  needed for Sleep or Anxiety.    methylPREDNISolone (MEDROL) 4 MG Oral Tablet Therapy Pack 1 each 0     Sig: As directed.       Health Maintenance:  Health Maintenance Due   Topic Date Due    Zoster Vaccines (1 of 2) Never done    COVID-19 Vaccine (4 - 2024-25 season) 09/01/2024    Mammogram  12/01/2024    Pap Smear  08/04/2025       Patient/Caregiver Education: Patient/Caregiver Education: There are no barriers to learning. Medical education done.   Outcome: Patient verbalizes understanding. Patient is notified to call with any questions, complications, allergies, or worsening or changing symptoms.  Patient is to call with any side effects or complications from the treatments as a result of today.     Problem List:  Problem List[7]    HEATHER RIVERA, DO    Please note that portions of this note may have been completed with a voice recognition program. Efforts were made to edit the dictations but occasionally words are mis-transcribed.         [1]   Past Medical History:   Anxiety    Arthritis    Back pain    Constipation    Depression    Diabetes (HCC)    Diabetes mellitus (HCC)    Esophageal reflux    Fatigue    Flatulence/gas pain/belching    Frequent use of laxatives    Med complication    H/O degenerative disc disease    Headache disorder    Migraine    High cholesterol    History of depression    History of mental disorder    Hx of motion sickness    Hyperlipidemia    Hypertension    Impaired vision    Lichen sclerosus    Loss of appetite    Lumbar disc herniation    Migraines    Nausea    Periodic with migraines    Nausea and/or vomiting    Osteoarthritis    Pain in joints    Pain with bowel movements    Problems with swallowing    S/P hip replacement, left    Sleep apnea    Sleep disturbance    CPAP    Stress    Type 2 diabetes mellitus (HCC)    Uncomfortable fullness after meals    Migraines no appitite    Visual impairment    glasses for distance    Vomiting    Multiple times daily feb/march lessened  and now none    Wears glasses    Weight loss   [2]   Past Surgical History:  Procedure Laterality Date    Biopsy  02/2024    Labia    Colonoscopy  01/01/2007    Evaluate only, bladder (dmg)  2007    Hip replacement surgery  2019    Hip surgery Left 10/21/2019    THR    Other surgical history      SLING (Bladder)    Other surgical history  2010    bladder sling at [facility],    Tonsillectomy     [3]   Social History  Socioeconomic History    Marital status:     Number of children: 4   Tobacco Use    Smoking status: Never     Passive exposure: Never    Smokeless tobacco: Never   Vaping Use    Vaping status: Never Used   Substance and Sexual Activity    Alcohol use: Yes     Comment: 1 every couple months    Drug use: No   Other Topics Concern    Caffeine Concern No    Stress Concern Yes    Weight Concern No    Special Diet Yes    Exercise No    Seat Belt Yes     Social Drivers of Health     Food Insecurity: No Food Insecurity (7/18/2024)    Received from [facility]    Hunger Vital Sign     Worried About Running Out of Food in the Last Year: Never true     Ran Out of Food in the Last Year: Never true   Transportation Needs: No Transportation Needs (7/18/2024)    Received from [facility]    PRAPARE - Transportation     Lack of Transportation (Medical): No     Lack of Transportation (Non-Medical): No   Housing Stability: Low Risk  (7/18/2024)    Received from [facility]    Housing Stability Vital Sign     Unable to Pay for Housing in the Last Year: No     Number of Places Lived in the Last Year: 1     Unstable Housing in the Last Year: No   [4]   Family History  Problem Relation Age of Onset    Prostate Cancer Father     Cancer Father 73        prostate    Hypertension Mother     Psychiatric Mother     Migraines Daughter     Diabetes Maternal Grandfather     Breast Cancer Paternal Grandmother 50    Hypertension Sister     Lipids Other     Hypertension  Other    [5]   Allergies  Allergen Reactions    Topiramate CONFUSION   [6]   Current Outpatient Medications   Medication Sig Dispense Refill    temazepam 7.5 MG Oral Cap Take 1 capsule (7.5 mg total) by mouth nightly as needed for Sleep or Anxiety. 30 capsule 0    methylPREDNISolone (MEDROL) 4 MG Oral Tablet Therapy Pack As directed. 1 each 0    TRESIBA FLEXTOUCH 100 UNIT/ML Subcutaneous Solution Pen-injector As directed 3 mL 0    insulin degludec (TRESIBA FLEXTOUCH) 100 UNIT/ML Subcutaneous Solution Pen-injector Up to 10 units daily as directed w dose titration (Patient taking differently: Inject 8 Units into the skin daily. Up to 10 units daily as directed w dose titration)      nortriptyline 10 MG Oral Cap Take 1 capsule (10 mg total) by mouth nightly.      semaglutide (OZEMPIC, 1 MG/DOSE,) 4 MG/3ML Subcutaneous Solution Pen-injector Inject 1 mg into the skin once a week. 3 mL 5    dapagliflozin (FARXIGA) 10 MG Oral Tab Take 1 tablet (10 mg total) by mouth daily. 90 tablet 1    lithium carbonate 600 MG Oral Cap Take 1 capsule (600 mg total) by mouth After dinner.      omeprazole 20 MG Oral Capsule Delayed Release Take 1 capsule (20 mg total) by mouth every morning. 90 capsule 3    Continuous Glucose Sensor (DEXCOM G7 SENSOR) Does not apply Misc 1 each Every 10 days. Use as directed every 10 days 3 each 11    ALPRAZolam 1 MG Oral Tab Take 1 tablet (1 mg total) by mouth 2 (two) times daily as needed for Sleep or Anxiety. 60 tablet 3    lamoTRIgine 200 MG Oral Tab Take 1 tablet (200 mg total) by mouth 2 (two) times daily. 60 tablet 3    rosuvastatin 20 MG Oral Tab Take 1 tablet (20 mg total) by mouth nightly. 90 tablet 3    ketoconazole 2 % External Cream Apply 1 Application topically daily as needed. If symptoms can apply to affected area daily for 2-3 weeks at a time. If not improving after 2-3 weeks please contact physician 30 g 2    Glucose Blood (CONTOUR NEXT TEST) In Vitro Strip Use as directed 100 each 2     ondansetron (ZOFRAN) 4 mg tablet TAKE 1 TABLET BY MOUTH TO BE TAKEN WITH THE FIRST DOSE OF MIGRAINE MEDICATION 30 tablet 2    nystatin 100,000 Units/g External Cream ORDER IS FOR NYSTATIN SUPPOSITORY 100,000 UNITS IN VAGINA NIGHTLY FOR 30 DAYS - WILL FAX ORDER TO [facility] 30 each 0    insulin aspart (NOVOLOG FLEXPEN) 100 Units/mL Subcutaneous Solution Pen-injector INJECT 3X/DAY WITH MEALS AS DIRECTED UP TO TOTAL DAILY DOSE = 20 UNITS 15 mL 0    Insulin Pen Needle (BD PEN NEEDLE ARCHANA U/F) 32G X 4 MM Does not apply Misc Inject 1 Pen into the skin 4 (four) times daily. Use to inject insulin 4x/day as directed 200 each 3    estradiol 0.1 MG/GM Vaginal Cream 1 gram per vagina nightly for 2 weeks, then 1 gram per vagina three times per week for at least 10 weeks. May taper to 0.5 grams (pea-sized amount) nightly thereafter. 1 each 3    Eletriptan Hydrobromide 40 MG Oral Tab use at onset; may repeat once after 4 hours- ONLY 2 IN 24 HOUR PERIOD MAX.  This is a 30 day supply. 8 tablet 11    Microlet Lancets Does not apply Misc Test blood glucose twice daily 200 each 11    semaglutide (OZEMPIC, 0.25 OR 0.5 MG/DOSE,) 2 MG/3ML Subcutaneous Solution Pen-injector As directed (Patient not taking: Reported on 7/16/2025) 3 mL 0    lithium carbonate 300 MG Oral Cap Take 1 capsule (300 mg total) by mouth After dinner. (Patient not taking: Reported on 7/16/2025)      FLUCYTOSINE Does not apply Powder 16% flucytosine cream, apply 5g nightly for 14 days (Patient not taking: Reported on 7/16/2025) 1 each 0    dicyclomine 10 MG Oral Cap Take 1 capsule (10 mg total) by mouth 3 (three) times daily as needed (abd cramping). (Patient not taking: Reported on 7/16/2025) 90 capsule 1    Fluticasone Propionate 50 MCG/ACT Nasal Suspension USE 1 SPRAY IN EACH NOSTRIL TWICE A DAY (Patient not taking: Reported on 7/16/2025)     [7]   Patient Active Problem List  Diagnosis    Osteoarthrosis, unspecified whether generalized or localized,  pelvic region and thigh    Enthesopathy of hip region    Type 2 diabetes mellitus with hyperglycemia, with long-term current use of insulin (HCC)    Obstructive sleep apnea    Mixed hyperlipidemia    Bicipital tenosynovitis, left    Primary osteoarthritis of left hip    Status post total hip replacement, left    DDD (degenerative disc disease), lumbar    Lumbar disc herniation    Trochanteric bursitis, left hip    Major depressive disorder, recurrent episode, moderate ([facility])    Panic disorder without agoraphobia    Obsessive-compulsive disorder    Primary insomnia    Anemia    Migraine, chronic, without aura, intractable, with status migrainosus    Colon polyp    Internal hemorrhoids    Godwin's esophagus    Eosinophilic esophagitis

## 2025-07-16 NOTE — TELEPHONE ENCOUNTER
Dr. Melchor requested pt be seen earlier due to increased migraines.    Originally scheudled for 7/18/2025 in [facility] office,  provider reviewed chart and would like more time with patient.    Offered pt Monday in [place] at 2:15. Pt accepted and scheduled.

## 2025-07-21 ENCOUNTER — OFFICE VISIT (OUTPATIENT)
Dept: NEUROLOGY | Facility: CLINIC | Age: 60
End: 2025-07-21
Payer: COMMERCIAL

## 2025-07-21 ENCOUNTER — TELEPHONE (OUTPATIENT)
Dept: NEUROLOGY | Facility: CLINIC | Age: 60
End: 2025-07-21

## 2025-07-21 VITALS
HEART RATE: 82 BPM | RESPIRATION RATE: 16 BRPM | BODY MASS INDEX: 24 KG/M2 | SYSTOLIC BLOOD PRESSURE: 122 MMHG | DIASTOLIC BLOOD PRESSURE: 74 MMHG | WEIGHT: 142 LBS

## 2025-07-21 DIAGNOSIS — G43.711 MIGRAINE, CHRONIC, WITHOUT AURA, INTRACTABLE, WITH STATUS MIGRAINOSUS: Primary | ICD-10-CM

## 2025-07-21 PROCEDURE — 99214 OFFICE O/P EST MOD 30 MIN: CPT | Performed by: OTHER

## 2025-07-21 RX ORDER — ONDANSETRON 4 MG/1
TABLET, FILM COATED ORAL
Qty: 30 TABLET | Refills: 2 | Status: SHIPPED | OUTPATIENT
Start: 2025-07-21

## 2025-07-21 RX ORDER — FREMANEZUMAB-VFRM 225 MG/1.5ML
225 INJECTION SUBCUTANEOUS
Qty: 1.5 ML | Refills: 3 | Status: SHIPPED | OUTPATIENT
Start: 2025-07-21

## 2025-07-21 RX ORDER — RIZATRIPTAN BENZOATE 10 MG/1
TABLET ORAL
Qty: 12 TABLET | Refills: 0 | Status: SHIPPED | OUTPATIENT
Start: 2025-07-21

## 2025-07-21 RX ORDER — LORAZEPAM 0.5 MG/1
0.5 TABLET ORAL 2 TIMES DAILY
COMMUNITY
Start: 2025-07-17

## 2025-07-21 RX ORDER — RIMEGEPANT SULFATE 75 MG/75MG
75 TABLET, ORALLY DISINTEGRATING ORAL AS NEEDED
Qty: 2 TABLET | Refills: 0 | COMMUNITY
Start: 2025-07-21 | End: 2026-07-21

## 2025-07-21 NOTE — PROGRESS NOTES
Patient states increase in migraines within the past 3 years. Patient states slight benefit with botox. Patient states 15 migraines a month.

## 2025-07-21 NOTE — PATIENT INSTRUCTIONS
Refill policies:    Allow 2-3 business days for refills; controlled substances may take longer.  Contact your pharmacy at least 5 days prior to running out of medication and have them send an electronic request or submit request through the “request refill” option in your [employer] account.  Refills are not addressed on weekends; covering physicians do not authorize routine medications on weekends.  No narcotics or controlled substances are refilled after noon on Fridays or by on call physicians.  By law, narcotics must be electronically prescribed.  A 30 day supply with no refills is the maximum allowed.  If your prescription is due for a refill, you may be due for a follow up appointment.  To best provide you care, patients receiving routine medications need to be seen at least once a year.  Patients receiving narcotic/controlled substance medications need to be seen at least once every 3 months.  In the event that your preferred pharmacy does not have the requested medication in stock (e.g. Backordered), it is your responsibility to find another pharmacy that has the requested medication available.  We will gladly send a new prescription to that pharmacy at your request.    Scheduling Tests:    If your physician has ordered radiology tests such as MRI or CT scans, please contact Central Scheduling at [phone] right away to schedule the test.  Once scheduled, the [facility] beds at the [facility] Centralized Referral Team will work with your insurance carrier to obtain pre-certification or prior authorization.  Depending on your insurance carrier, approval may take 3-10 days.  It is highly recommended patients assure they have received an authorization before having a test performed.  If test is done without insurance authorization, patient may be responsible for the entire amount billed.      Precertification and Prior Authorizations:  If your physician has recommended that you have a procedure or additional testing performed the [facility] beds at the [facility]  Centralized Referral Team will contact your insurance carrier to obtain pre-certification or prior authorization.    You are strongly encouraged to contact your insurance carrier to verify that your procedure/test has been approved and is a COVERED benefit.  Although the [facility] Centralized Referral Team does its due diligence, the insurance carrier gives the disclaimer that \"Although the procedure is authorized, this does not guarantee payment.\"    Ultimately the patient is responsible for payment.   Thank you for your understanding in this matter.  Paperwork Completion:  If you require FMLA or disability paperwork for your recovery, please make sure to either drop it off or have it faxed to our office at [phone]. Be sure the form has your name and date of birth on it.  The form will be faxed to our Forms Department and they will complete it for you.  There is a 25$ fee for all forms that need to be filled out.  Please be aware there is a 10-14 day turnaround time.  You will need to sign a release of information (EVANGELINA) form if your paperwork does not come with one.  You may call the Forms Department with any questions at [phone].  Their fax number is [phone].

## 2025-07-21 NOTE — TELEPHONE ENCOUNTER
Prior authorization approved  Payer: [employer] Case ID: [id number]    [phone]    [phone]  Note from payer: CaseId:[id number];Status:Approved;Review Type:Prior Auth;Coverage Start Date:06/21/2025;Coverage End Date:07/21/2026;  Approval Details    Authorized from June 21, 2025 to July 21, 2026  Electronic appeal: Not supported

## 2025-07-21 NOTE — PROGRESS NOTES
The following individual(s) verbally consented to be recorded using ambient AI listening technology and understand that they can each withdraw their consent to this listening technology at any point by asking the clinician to turn off or pause the recording:    Patient name: Vero DANIEL Michel  Additional names:  N/A

## 2025-07-22 NOTE — PROGRESS NOTES
Neurology H&P    Vero Michel Patient Status:  No patient class for patient encounter    1965 MRN [id number]   Location [facility] Attending No att. providers found   Hosp Day # 0 PCP HEATHER RIVERA, DO     Subjective:  Vero Michel is a(n) 60 year old female.  History of Present Illness  Sarah Michel is a 60 year old female with intractable migraines who presents for management of her condition.    She has been experiencing intractable migraines for the past 15 years, with a frequency of approximately 15 migraines per month. The pain is severe, often requiring her to hold her head and face. She experiences sensitivity to light, sound, and smells, with lavender being particularly bothersome. Nausea and vomiting are also associated with her migraines.    Over the years, she has tried numerous treatments, including Ubrelvy, which was effective until about three years ago. Other medications she has used include Botox, Qulipta, Topamax, Depakote, tricyclic antidepressants like nortriptyline and amitriptyline, gabapentin, venlafaxine, Lamictal, depakote, and propranolol. Topamax caused confusion and gabapentin led to sedation. She has not tried monthly injectables like Emgality or Ajovy, nor has she used Vyepti infusions. Currently, she uses eletriptan for acute migraine attacks, although it has been less effective recently, possibly due to increased stress following her father's death.    Her migraines seem to be exacerbated by stress and weather changes. She mentions a significant reduction in migraine frequency when she started a new, less stressful job. She works as a school nurse, having previously worked in psychiatric nursing, which was more stressful.    There is no family history of migraines. She has a history of Geary's disease in her family, with her mother being diagnosed. 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 She participates in [facility] for monitoring and management.     Current Medications:  Current Medications[1]    Problem List:  Problem List[2]    PMHx:  Past Medical History[3]    PSHx:  Past Surgical History[4]    SocHx:  Short Social Hx on File[5]    Family History:  Family History[6]        ROS:  10 point ROS completed and was negative, except for pertinent positive and negatives stated in subjective.    Objective/Physical Exam:    Vital Signs:  Blood pressure 122/74, pulse 82, resp. rate 16, weight 142 lb (64.4 kg), last menstrual period 10/21/2012, not currently breastfeeding.    Gen: Awake and in no apparent distress  HEENT: moist mucus membranes  Neck: Supple  Cardiovascular: Regular rate and rhythm, no murmur  Pulm: CTAB  GI: non-tender, normal bowel sounds  Skin: normal, dry  Extremities: No clubbing or cyanosis      Neurologic:   Physical Exam    MENTAL STATUS: alert, ox3, normal attention, language and fund of knowledge.      CRANIAL NERVES II to XII: PERRLA, no ptosis or diplopia, EOM intact, facial sensation intact, strong eye closure, face is symmetric, no dysarthria, tongue midline,  no tongue fasciculations or atrophy, strong shoulder shrug.    MOTOR EXAMINATION: normal tone, no fasciculations, normal strength throughout in UEs and LEs      SENSORY EXAMINATION:  UE: intact to light touch, pinprick intact  LE: intact to light touch, pinprick intact    COORDINATION:  No dysmetria, or intention tremors     REFLEXES: 2+ at biceps, 2+ brachioradialis, 2+ at patella     GAIT: normal stance, normal gait              Labs:       Imaging:  No CNS imaging to review    Assessment & Plan  Intractable migraines  Chronic migraines for 15 years, 15 episodes/month, resistant to multiple treatments. Current eletriptan ineffective.  - Initiated Ajovy monthly injection, assess efficacy in 3 months.  - Provided Nurtec samples as alternative to eletriptan.  - Prescribed rizatriptan if Nurtec fails.  - Advised magnesium and riboflavin  supplementation are options.  - Follow-up in 3-4 months to evaluate treatment.        Vincent June,   Neurology         [1]   Current Outpatient Medications   Medication Sig Dispense Refill    LORazepam 0.5 MG Oral Tab Take 1 tablet (0.5 mg total) by mouth 2 (two) times daily.      Fremanezumab-vfrm (AJOVY) 225 MG/1.5ML Subcutaneous Solution Auto-injector Inject 225 mg into the skin every 30 (thirty) days. 1.5 mL 3    Rizatriptan Benzoate 10 MG Oral Tab Take one tablet at onset of migraine; make take one additional tablet after 2 hours- ONLY 2 tabs IN 24 HOUR PERIOD MAX.  This is a 30 day supply. 12 tablet 0    ondansetron (ZOFRAN) 4 mg tablet TAKE 1 TABLET BY MOUTH TO BE TAKEN WITH THE FIRST DOSE OF MIGRAINE MEDICATION 30 tablet 2    Rimegepant Sulfate (NURTEC) 75 MG Oral Tablet Dispersible Take 75 mg by mouth as needed. Take one tablet at onset of migraine.  Maximum dose in 24 hours is 1 tablet (75mg). 2 tablet 0    methylPREDNISolone (MEDROL) 4 MG Oral Tablet Therapy Pack As directed. 1 each 0    insulin degludec (TRESIBA FLEXTOUCH) 100 UNIT/ML Subcutaneous Solution Pen-injector Up to 10 units daily as directed w dose titration (Patient taking differently: Inject 8 Units into the skin. Up to 10 units daily as directed w dose titration)      semaglutide (OZEMPIC, 1 MG/DOSE,) 4 MG/3ML Subcutaneous Solution Pen-injector Inject 1 mg into the skin once a week. 3 mL 5    dapagliflozin (FARXIGA) 10 MG Oral Tab Take 1 tablet (10 mg total) by mouth daily. 90 tablet 1    lithium carbonate 600 MG Oral Cap Take 1 capsule (600 mg total) by mouth After dinner.      omeprazole 20 MG Oral Capsule Delayed Release Take 1 capsule (20 mg total) by mouth every morning. 90 capsule 3    Continuous Glucose Sensor (DEXCOM G7 SENSOR) Does not apply Misc 1 each Every 10 days. Use as directed every 10 days 3 each 11    ALPRAZolam 1 MG Oral Tab Take 1 tablet (1 mg total) by mouth 2 (two) times daily as needed for Sleep or Anxiety. 60  tablet 3    lamoTRIgine 200 MG Oral Tab Take 1 tablet (200 mg total) by mouth 2 (two) times daily. (Patient taking differently: Take 1.5 tablets (300 mg total) by mouth daily.) 60 tablet 3    rosuvastatin 20 MG Oral Tab Take 1 tablet (20 mg total) by mouth nightly. 90 tablet 3    ketoconazole 2 % External Cream Apply 1 Application topically daily as needed. If symptoms can apply to affected area daily for 2-3 weeks at a time. If not improving after 2-3 weeks please contact physician 30 g 2    Glucose Blood (CONTOUR NEXT TEST) In Vitro Strip Use as directed 100 each 2    insulin aspart (NOVOLOG FLEXPEN) 100 Units/mL Subcutaneous Solution Pen-injector INJECT 3X/DAY WITH MEALS AS DIRECTED UP TO TOTAL DAILY DOSE = 20 UNITS 15 mL 0    Insulin Pen Needle (BD PEN NEEDLE ARCHANA U/F) 32G X 4 MM Does not apply Misc Inject 1 Pen into the skin 4 (four) times daily. Use to inject insulin 4x/day as directed 200 each 3    estradiol 0.1 MG/GM Vaginal Cream 1 gram per vagina nightly for 2 weeks, then 1 gram per vagina three times per week for at least 10 weeks. May taper to 0.5 grams (pea-sized amount) nightly thereafter. 1 each 3    Microlet Lancets Does not apply Misc Test blood glucose twice daily 200 each 11    Fluticasone Propionate 50 MCG/ACT Nasal Suspension USE 1 SPRAY IN EACH NOSTRIL TWICE A DAY     [2]   Patient Active Problem List  Diagnosis    Osteoarthrosis, unspecified whether generalized or localized, pelvic region and thigh    Enthesopathy of hip region    Type 2 diabetes mellitus with hyperglycemia, with long-term current use of insulin (HCC)    Obstructive sleep apnea    Mixed hyperlipidemia    Bicipital tenosynovitis, left    Primary osteoarthritis of left hip    Status post total hip replacement, left    DDD (degenerative disc disease), lumbar    Lumbar disc herniation    Trochanteric bursitis, left hip    Major depressive disorder, recurrent episode, moderate (HCC)    Panic disorder without agoraphobia     Obsessive-compulsive disorder    Primary insomnia    Anemia    Migraine, chronic, without aura, intractable, with status migrainosus    Colon polyp    Internal hemorrhoids    Godwin's esophagus    Eosinophilic esophagitis   [3]   Past Medical History:   Anxiety    Arthritis    Back pain    Constipation    Depression    Diabetes (HCC)    Diabetes mellitus (HCC)    Esophageal reflux    Fatigue    Flatulence/gas pain/belching    Frequent use of laxatives    Med complication    H/O degenerative disc disease    Headache disorder    Migraine    High cholesterol    History of depression    History of mental disorder    Hx of motion sickness    Hyperlipidemia    Hypertension    Impaired vision    Lichen sclerosus    Loss of appetite    Lumbar disc herniation    Migraines    Nausea    Periodic with migraines    Nausea and/or vomiting    Osteoarthritis    Pain in joints    Pain with bowel movements    Problems with swallowing    S/P hip replacement, left    Sleep apnea    Sleep disturbance    CPAP    Stress    Type 2 diabetes mellitus (HCC)    Uncomfortable fullness after meals    Migraines no appitite    Visual impairment    glasses for distance    Vomiting    Multiple times daily feb/march lessened and now none    Wears glasses    Weight loss   [4]   Past Surgical History:  Procedure Laterality Date    Biopsy  02/2024    Labia    Colonoscopy  01/01/2007    Evaluate only, bladder (dmg)  2007    Hip replacement surgery  2019    Hip surgery Left 10/21/2019    THR    Other surgical history      SLING (Bladder)    Other surgical history  2010    bladder sling at [facility],    Tonsillectomy     [5]   Social History  Socioeconomic History    Marital status:     Number of children: 4   Tobacco Use    Smoking status: Never     Passive exposure: Never    Smokeless tobacco: Never   Vaping Use    Vaping status: Never Used   Substance and Sexual Activity    Alcohol use: Yes     Comment: 1 every couple months    Drug use: No   Other  Topics Concern    Caffeine Concern No    Stress Concern Yes    Weight Concern No    Special Diet Yes    Exercise No    Seat Belt Yes     Social Drivers of Health     Food Insecurity: No Food Insecurity (7/18/2024)    Received from [facility]    Hunger Vital Sign     Worried About Running Out of Food in the Last Year: Never true     Ran Out of Food in the Last Year: Never true   Transportation Needs: No Transportation Needs (7/18/2024)    Received from [facility]    PRAPARE - Transportation     Lack of Transportation (Medical): No     Lack of Transportation (Non-Medical): No   Housing Stability: Low Risk  (7/18/2024)    Received from [facility]    Housing Stability Vital Sign     Unable to Pay for Housing in the Last Year: No     Number of Places Lived in the Last Year: 1     Unstable Housing in the Last Year: No   [6]   Family History  Problem Relation Age of Onset    Prostate Cancer Father     Cancer Father 73        prostate    Hypertension Mother     Psychiatric Mother     Migraines Daughter     Diabetes Maternal Grandfather     Breast Cancer Paternal Grandmother 50    Hypertension Sister     Lipids Other     Hypertension Other

## 2025-08-04 ENCOUNTER — TELEPHONE (OUTPATIENT)
Facility: CLINIC | Age: 60
End: 2025-08-04

## 2025-08-04 DIAGNOSIS — Z79.4 TYPE 2 DIABETES MELLITUS WITH HYPERGLYCEMIA, WITH LONG-TERM CURRENT USE OF INSULIN (HCC): Primary | ICD-10-CM

## 2025-08-04 DIAGNOSIS — E11.65 TYPE 2 DIABETES MELLITUS WITH HYPERGLYCEMIA, WITH LONG-TERM CURRENT USE OF INSULIN (HCC): Primary | ICD-10-CM

## 2025-08-05 ENCOUNTER — LAB ENCOUNTER (OUTPATIENT)
Dept: LAB | Age: 60
End: 2025-08-05
Attending: FAMILY MEDICINE

## 2025-08-05 ENCOUNTER — PATIENT MESSAGE (OUTPATIENT)
Facility: CLINIC | Age: 60
End: 2025-08-05

## 2025-08-05 DIAGNOSIS — E11.65 TYPE 2 DIABETES MELLITUS WITH HYPERGLYCEMIA, WITH LONG-TERM CURRENT USE OF INSULIN (HCC): ICD-10-CM

## 2025-08-05 DIAGNOSIS — F42.9 OBSESSIVE-COMPULSIVE DISORDER, UNSPECIFIED TYPE: ICD-10-CM

## 2025-08-05 DIAGNOSIS — Z79.4 TYPE 2 DIABETES MELLITUS WITH HYPERGLYCEMIA, WITH LONG-TERM CURRENT USE OF INSULIN (HCC): ICD-10-CM

## 2025-08-05 DIAGNOSIS — F41.0 PANIC DISORDER WITHOUT AGORAPHOBIA: ICD-10-CM

## 2025-08-05 DIAGNOSIS — F33.1 MAJOR DEPRESSIVE DISORDER, RECURRENT EPISODE, MODERATE (HCC): ICD-10-CM

## 2025-08-05 LAB
ALBUMIN SERPL-MCNC: 4.5 G/DL (ref 3.2–4.8)
ALBUMIN/GLOB SERPL: 2 (ref 1–2)
ALP LIVER SERPL-CCNC: 58 U/L (ref 50–130)
ALT SERPL-CCNC: 19 U/L (ref 10–49)
ANION GAP SERPL CALC-SCNC: 11 MMOL/L (ref 0–18)
AST SERPL-CCNC: 24 U/L (ref ?–34)
BILIRUB SERPL-MCNC: 0.6 MG/DL (ref 0.2–1.1)
BUN BLD-MCNC: 14 MG/DL (ref 9–23)
CALCIUM BLD-MCNC: 10.1 MG/DL (ref 8.7–10.6)
CHLORIDE SERPL-SCNC: 106 MMOL/L (ref 98–112)
CO2 SERPL-SCNC: 26 MMOL/L (ref 21–32)
CREAT BLD-MCNC: 0.83 MG/DL (ref 0.55–1.02)
EGFRCR SERPLBLD CKD-EPI 2021: 80 ML/MIN/1.73M2 (ref 60–?)
EST. AVERAGE GLUCOSE BLD GHB EST-MCNC: 163 MG/DL (ref 68–126)
FASTING STATUS PATIENT QL REPORTED: NO
GLOBULIN PLAS-MCNC: 2.3 G/DL (ref 2–3.5)
GLUCOSE BLD-MCNC: 192 MG/DL (ref 70–99)
HBA1C MFR BLD: 7.3 % (ref ?–5.7)
LITHIUM SERPL-SCNC: 0.5 MMOL/L (ref 0.6–1.2)
OSMOLALITY SERPL CALC.SUM OF ELEC: 302 MOSM/KG (ref 275–295)
POTASSIUM SERPL-SCNC: 4.3 MMOL/L (ref 3.5–5.1)
PROT SERPL-MCNC: 6.8 G/DL (ref 5.7–8.2)
SODIUM SERPL-SCNC: 143 MMOL/L (ref 136–145)

## 2025-08-05 PROCEDURE — 36415 COLL VENOUS BLD VENIPUNCTURE: CPT

## 2025-08-05 PROCEDURE — 80178 ASSAY OF LITHIUM: CPT

## 2025-08-05 PROCEDURE — 83036 HEMOGLOBIN GLYCOSYLATED A1C: CPT

## 2025-08-05 PROCEDURE — 80053 COMPREHEN METABOLIC PANEL: CPT

## 2025-08-14 ENCOUNTER — TELEPHONE (OUTPATIENT)
Facility: CLINIC | Age: 60
End: 2025-08-14

## 2025-08-14 ENCOUNTER — TELEMEDICINE (OUTPATIENT)
Facility: CLINIC | Age: 60
End: 2025-08-14

## 2025-08-14 DIAGNOSIS — Z79.4 TYPE 2 DIABETES MELLITUS WITH HYPERGLYCEMIA, WITH LONG-TERM CURRENT USE OF INSULIN (HCC): Primary | ICD-10-CM

## 2025-08-14 DIAGNOSIS — E78.2 MIXED HYPERLIPIDEMIA: ICD-10-CM

## 2025-08-14 DIAGNOSIS — E11.65 TYPE 2 DIABETES MELLITUS WITH HYPERGLYCEMIA, WITH LONG-TERM CURRENT USE OF INSULIN (HCC): Primary | ICD-10-CM

## 2025-08-14 RX ORDER — SEMAGLUTIDE 1.34 MG/ML
1 INJECTION, SOLUTION SUBCUTANEOUS WEEKLY
Qty: 3 ML | Refills: 5 | Status: SHIPPED | OUTPATIENT
Start: 2025-08-14

## 2025-08-14 RX ORDER — INSULIN GLARGINE 100 [IU]/ML
INJECTION, SOLUTION SUBCUTANEOUS
Qty: 15 ML | Refills: 0 | Status: SHIPPED | OUTPATIENT
Start: 2025-08-14 | End: 2025-08-14

## 2025-08-14 RX ORDER — INSULIN GLARGINE-YFGN 100 [IU]/ML
INJECTION, SOLUTION SUBCUTANEOUS
Qty: 15 ML | Refills: 0 | Status: CANCELLED | OUTPATIENT
Start: 2025-08-14

## 2025-08-14 RX ORDER — INSULIN DEGLUDEC 100 U/ML
INJECTION, SOLUTION SUBCUTANEOUS
Qty: 15 ML | Refills: 1 | Status: SHIPPED | OUTPATIENT
Start: 2025-08-14 | End: 2025-08-14

## 2025-08-15 ENCOUNTER — TELEPHONE (OUTPATIENT)
Facility: CLINIC | Age: 60
End: 2025-08-15

## (undated) DIAGNOSIS — N17.9 AKI (ACUTE KIDNEY INJURY) (HCC): Primary | ICD-10-CM

## (undated) DIAGNOSIS — E83.52 HYPERCALCEMIA: Primary | ICD-10-CM

## (undated) DIAGNOSIS — R80.9 PROTEINURIA, UNSPECIFIED TYPE: Primary | ICD-10-CM

## (undated) DIAGNOSIS — N17.9 AKI (ACUTE KIDNEY INJURY) (HCC): ICD-10-CM

## (undated) DIAGNOSIS — E11.65 UNCONTROLLED TYPE 2 DIABETES MELLITUS WITH HYPERGLYCEMIA (HCC): Primary | ICD-10-CM

## (undated) DEVICE — GLOVE SURG SENSICARE SZ 7-1/2

## (undated) DEVICE — REMOVER DURAPREP 3M

## (undated) DEVICE — DRAPE C-ARM UNIVERSAL

## (undated) DEVICE — DECANTER BAG 9": Brand: MEDLINE INDUSTRIES, INC.

## (undated) DEVICE — PAIN TRAY: Brand: MEDLINE INDUSTRIES, INC.

## (undated) DEVICE — PADDING CAST COTTON  4

## (undated) DEVICE — DRESSING AQUACEL AG 3.5 X 10

## (undated) DEVICE — Device

## (undated) DEVICE — NEEDLE SPINAL 22X3-1/2 BLK

## (undated) DEVICE — SUTURE VICRYL 1 CPX

## (undated) DEVICE — SUTURE ETHIBOND 5 CCS

## (undated) DEVICE — GLOVE SURG SENSICARE SZ 7

## (undated) DEVICE — BLADE ELECTRODE: Brand: EDGE

## (undated) DEVICE — KENDALL SCD EXPRESS SLEEVES, KNEE LENGTH, MEDIUM: Brand: KENDALL SCD

## (undated) DEVICE — STANDARD HYPODERMIC NEEDLE,POLYPROPYLENE HUB: Brand: MONOJECT

## (undated) DEVICE — BANDAID COVERLET 1X3

## (undated) DEVICE — NEEDLE SPINAL 22X5 405148

## (undated) DEVICE — ANTERIOR HIP: Brand: MEDLINE INDUSTRIES, INC.

## (undated) DEVICE — SUTURE VICRYL 2-0 CP-1

## (undated) DEVICE — WRAP COOLING HIP W/ICE PILLOW

## (undated) DEVICE — HOOD, PEEL-AWAY: Brand: FLYTE

## (undated) DEVICE — PILLOW ABDUCTION HIP SM

## (undated) DEVICE — GOWN SURG AERO CHROME XXL

## (undated) DEVICE — CODMAN® INTEGRATED BIPOLAR CORD AND TUBING SET FLYING LEADS, ROTARY PUMP: Brand: CODMAN®

## (undated) DEVICE — Device: Brand: PLUMEPEN

## (undated) DEVICE — STERILE POLYISOPRENE POWDER-FREE SURGICAL GLOVES: Brand: PROTEXIS

## (undated) DEVICE — GLOVE SURG SENSICARE SZ 6-1/2

## (undated) DEVICE — SPECIMEN CONTAINER,POSITIVE SEAL INDICATOR, OR PACKAGED: Brand: PRECISION

## (undated) DEVICE — SHEET,DRAPE,70X100,STERILE: Brand: MEDLINE

## (undated) DEVICE — Device: Brand: STABLECUT®

## (undated) NOTE — LETTER
Patient Name: Welby Gowers        : 1965       Medical Record #: [id number]    CONSENT FOR PROCEDURES/SEDATION    Date: 3/19/2020       Time: 10:49 AM        1.  I authorize the performance upon Welby Gowers the following:  LEFT TROCHANTERIC B

## (undated) NOTE — LETTER
Patient Name: Mehreen Rodney        : 1965       Medical Record #: [id number]    CONSENT FOR PROCEDURES/SEDATION    Date: 3/19/2020       Time: 8:56 AM        1.  I authorize the performance upon Mehreen Rodney the following:    Left greater troch

## (undated) NOTE — LETTER
Patient Name: India Amaya        : 1965       Medical Record #: [id number]    CONSENT FOR PROCEDURES/SEDATION    Date: 2020       Time: 3:46 PM        1.  I authorize the performance upon India Amaya the following:  LEFT TROCHANTERIC BU

## (undated) NOTE — LETTER
Vero Michel, :1965  CONSENT FOR PROCEDURE/SEDATION    1. I authorize the performance upon Vero Michel  the following: Endometrial biopsy procedure    2. I authorize Dr. Nataly Hoyt MD (and whomever is designated as the doctor’s assistant), to perform the above-mentioned procedures.    3. If any unforeseen conditions arise during this procedure calling for additional  procedures, operations, or medications (including anesthesia and blood transfusion), I further request and authorize the doctor to do whatever he/she deems advisable in my interest.    4. I consent to the taking and reproduction of any photographs in the course of this procedure for professional purposes.    5. I consent to the administration of such sedation as may be considered necessary or advisable by the physician responsible for this service, with the exception of ______________________________________________________    6. I have been informed by my doctor of the nature and purpose of this procedure sedation, possible alternative methods of treatment, risk involved and possible complications.    7. If I have a Do Not Resuscitate (DNR) order in place, the physician and I (or the individual authorized to consent on my behalf) will discuss and agree as to whether the Do Not Resuscitate (DNR) order will remain in effect or will be discontinued during the performance of the procedure and the applicable recovery period. If the Do Not Resuscitate (DNR) order is discontinued and is to be reinstated following the procedure/recovery period, the physician will determine when the applicable recovery period ends for purposes of reinstating the Do Not Resuscitate (DNR) order.    Signature of Patient:_______________________________________________    Signature of person authorized to consent for patient:  _______________________________________________________________    Relationship to patient:  ____________________________________________    Witness: _________________________________________ Date:___________     Physician Signature: _______________________________ Date:___________

## (undated) NOTE — Clinical Note
Pt has HFU appt 4/7/22. Pt reports her headache is gone as of yesterday. Pt is still experiencing nausea and vomiting at times, states this is a chronic issue. Medication list reviewed. Thank you.

## (undated) NOTE — LETTER
[facility] [place] Department of OB/GYN  After Care Instructions for Endometrial Biopsy      Biopsy Results   You will receive a phone call with your biopsy results in 7 business days.  If you have not received your results in 7 days, please contact our office.  The results of your biopsy will determine if further treatment will be necessary.    Bleeding   You may have some light bleeding or blackish clumpy discharge for several days after your biopsy.    Restrictions    You should avoid intercourse or tampon use for 1 day after your biopsy.    Pain    You may experience mild menstrual cramping after your biopsy.  You may use Ibuprofen, Aleve or Tylenol to relieve your discomfort.  If you experience severe or persistent pain contact our office.      If you have any additional questions, please call us at [phone].

## (undated) NOTE — LETTER
Date: 5/17/2022    Patient Name: Sterling Dangelo        To Whom it may concern: This letter has been written at the patient's request. The above patient was seen at the [facility] for treatment of a medical condition.   Because of her condition that needs rest and treatment, she was advised to cut back on her work schedule to 1/2 time for the next 2 months          Sincerely,    Sumaya Singh MD

## (undated) NOTE — LETTER
22          Kyler Diaz  :  1965      To Whom It May Concern: This patient was seen in our office on 22 . Work status: May return to work part-time status, no restrictions for 2 months. May return to work status per above effective immediately. Patient to be re-evaluated in 2 months. If this office may be of further assistance, please do not hesitate to contact us.       Sincerely,        Marlene Taylor MD  Vascular and General Neurology  [facility]

## (undated) NOTE — IP AVS SNAPSHOT
Patient Demographics     Address  [address] [place] [address] [address] Phone  [phone] [facility])  [phone] (Work)  [phone] (Mobile) *Preferred* E-mail Address  [email or web address]      Emergency Contact(s)     Name Relation Home Work Mobile round the clock as prescribed to ease your mild to moderate pain. Kendall Freeze   is a combination drug (tylenol 325 mg and hydrocodone) for severe pain. Use as needed per instruction. Colace Helps to prevent constipation.       Please follow up with your PCP in ? Avoid smoking. It is known to cause breathing problems and can decrease the rate of healing. Incision care/Dressing changes  ? Wash hands before and after dressing changes.     FOR MEDIPORE/COVERLET DRESSINGS:  Change dressing daily using Medipore/cove ? Keep pain manageable; pain should not disrupt your sleep or activities like getting out of bed or walking. ? You may need pain medication regularly (every 4-6 hours) the first 2 weeks and then begin to decrease how often you are taking it.   ? Take pain ? Do not allow others or pets to touch your incision. ? Avoid people that have colds or the flu. ? Your surgeon may recommend that you take antibiotics before you undergo any dental or other invasive surgical procedures after your joint replacement.  Rolf ? Inability to walk or put weight on your surgical leg      Signs of blood clot  ? Pain, excessive tenderness, redness, or swelling in leg or calf (other than incision site).             Go directly to the ER or CALL 911 if  you:  ? become short of breath after surgery information. Tubigrip (compression sleeve) for HIPS    ? All patients should wear the compression sleeve until first follow up visit to [address] office (about 2-3 weeks) and then check with surgeon if need to continue use. ?  Take off to Commonly known as:  CELEBREX      Take 1 capsule (200 mg total) by mouth 2 (two) times daily. Una Goldstein PA-C         docusate sodium 100 MG Caps  Commonly known as:  COLACE      Take 1 capsule (100 mg total) by mouth 2 (two) times daily.    Gurdeep Rubio HYDROcodone-acetaminophen  MG Tabs           [id number] - MAR ACTION REPORT  (last 24 hrs)    ** SITE UNKNOWN **     Order ID Medication Name Action Time Action Reason Comments    [id number] HYDROcodone-acetaminophen (NORCO)  MG per tab 1 tablet Lab Results Last 24 Hours      CBC, PLATELET; NO DIFFERENTIAL [[id number]] (Abnormal)  Resulted: 10/23/19 0518, Result status: Final result   Ordering provider:  Curly Liriano PA-C  10/22/19 2300 Resulting lab:  [facility]    Specimen Informat Renal Tubular Epithelial Cells None Seen Small /LPF — Roman Mcmillan   Transitional Cells None Seen Small /LPF — Roman Mcmillan   Mucous Urine 1+ None Seen A Roman [place] [employer] Urine None Seen None Seen — Roman Montiel Performed By     Deyanira Gant • Unspecified sleep apnea    • Visual impairment     glasses for distance     Past Surgical History:   Procedure Laterality Date   • OTHER SURGICAL HISTORY      SLING   • TONSILLECTOMY       Family History   Problem Relation Age of Onset   • Cancer Father Disp: 30 capsule, Rfl: 4, More than a month at Unknown time        Review of Systems:  A comprehensive review of systems was negative. Physical Exam:  General: Alert, orientated x3. Cooperative. No apparent distress.   Vital Signs:  /82 (BP Locat Filed:  10/22/2019  9:55 AM Date of Service:  10/21/2019  5:18 PM Status:  Signed    :  Leonard Jackson MD (Physician)       [facility]  Report of Consultation[SG.1]    Ilda Michel[SG.2] Patient Status:  Inpatient    1965 MRN [id number] She reports current alcohol use. She reports that she does not use drugs. [SG.2]    Allergies:[SG.1]  No Known Allergies[SG.2]    Medications:[SG.1]  metFORMIN HCl  MG Oral Tablet 24 Hr, Take 750 mg by mouth daily with breakfast., Disp: , Rfl: , 10/19 Zaleplon 10 MG Oral Cap, Take 1 capsule (10 mg total) by mouth nightly., Disp: 30 capsule, Rfl: 4, More than a month at Unknown time          Current Facility-Administered Medications:   •  lactated ringers infusion, , Intravenous, Continuous  •  scopolami •  ceFAZolin sodium (ANCEF/KEFZOL) 2 GM/20ML premix IV syringe 2 g, 2 g, Intravenous, Q8H  •  diphenhydrAMINE (BENADRYL) cap/tab 25 mg, 25 mg, Oral, Q4H PRN **OR** diphenhydrAMINE HCl (BENADRYL) injection 12.5 mg, 12.5 mg, Intravenous, Q4H PRN  •  aspirin Musculoskeletal: Full range of motion of all extremities. No swelling noted. Extremities: No cyanosis, no clubbing, or edema. Integument: No lesions. No erythema.     Laboratory Data:[SG.1]   10/2/2019 glucose 117 sodium 140 potassium 4 chloride 107 bica Physical Therapy Note signed by Nguyen Peñaloza PTA at 10/22/2019  1:41 PM  Version 1 of 1    Author:  Nguyen Peñaloza PTA Service:  Rehab Author Type:  Physical Therapy Assistant    Filed:  10/22/2019  1:41 PM Date of Service:  10/22/2019 11:46 Management Techniques: Activity promotion; Body mechanics;Breathing techniques;Repositioning    BALANCE  Static Sitting: Good  Dynamic Sitting: Fair +  Static Standing: Fair -  Dynamic Standing: Fair -  ACTIVITY TOLERANCE                         O2 WALK Glut Sets[CY.1] 10[CY. 2] reps    Hip Abd/Add[CY.1] 10[CY. 2] reps    Heel slides[CY.1] 10[CY. 2] reps    Saq[CY.1] 10[CY. 2] reps    Sitting Knee Extension[CY.1] 10[CY. 2] reps    Standing heel/toe raises[CY.1] 10[CY. 2] reps    Hamstring Curls[CY.1] 10[CY. 2] r Goal #6           Goal Comments: Goals established on 10/21/2019[CY. 1]  Progressing[CY. 2]      Attribution Denise    CY. 1 - Etelvina Lea, PTA on 10/22/2019 11:46 AM  CY. 2 - Etelvina Lea, PTA on 10/22/2019  1:39 PM               Physical Therapy Patient Regularly Uses: Glasses[NP.3]    Prior Level of [place]:[NP.1] Patient lives with boyfriend in one level home with 3 steps to enter. Was independent with ADLs & self care + ambulation without AD. 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  No recent falls re -   Sitting down on and standing up from a chair with arms (e.g., wheelchair, bedside commode, etc.): A Little   -   Moving from lying on back to sitting on the side of the bed?: A Little[NP.3]   How much help from another person does the patient currently within reach;RN aware of session/findings; All patient questions and concerns addressed;SCDs in place; Ice applied; Family present; Discussed recommendations with /[NP.3]    ASSESSMENT   Patient is a[NP.3 53year old[NP.3] female admi Goal #1  Patient is able to demonstrate supine - sit EOB @ level: supervision     Goal #2  Patient is able to demonstrate transfers Sit to/from Stand at assistance level: supervison     Goal #3    Patient is able to ambulate[NP.1] 15[NP.2]0 feet with blair Primary osteoarthritis of left hip  Active Problems:    Hypertension    DM type 2 (diabetes mellitus, type 2) (HCC)    Obstructive sleep apnea    Hyperlipidemia with target low density lipoprotein (LDL) cholesterol less than 100 mg/dL    Depression    Ob Neurological Findings: None                ACTIVITY TOLERANCE           BP: 115/60  BP Location: Right arm  BP Method: Automatic  Patient Position: Sitting[BW.1]   HR to 130s with activity, RN aware[BW.3]    O2 SATURATIONS  SPO2 on Room Air at Rest: 94 Patient End of Session: Up in chair; With [facility] staff;Needs met;Call light within reach;RN aware of session/findings; All patient questions and concerns addressed;SCDs in place; Ice applied    ASSESSMENT   Patient is a[BW.3 53year old[BW.4] female admitted 10/ Patient/Caregiver able to demonstrate safety with ADLS: At supervision level or above; patient reports will have supervision at home[BW.1]        Attribution Key    BW.1 - Gosia Theodore OT on 10/22/2019  8:21 AM  BW.2 - Gosia Theodore OT on 10/2 • Type II or unspecified type diabetes mellitus without mention of complication, not stated as uncontrolled    • Unspecified essential hypertension    • Unspecified sleep apnea    • Visual impairment     glasses for distance       Past Surgical History  Pa -   Bathing (including washing, rinsing, drying)?: A Little(supervision)  -   Toileting, which includes using toilet, bedpan or urinal? : A Little(supervision)  -   Putting on and taking off regular upper body clothing?: None  -   Taking care of personal g reports will have supervision at home from boyfriend, who she reports will be staying home from work to assist her for at least the first two weeks. Patient also with good recall and return demo following hip precautions.  Patient reports no further questio Immunizations     Name Date      INFLUENZA defer-10/23/19     Deferral:  Patient Refused     INFLUENZA 10/11/17     Kenalog Per 10mg Inj 06/22/15       Future Appointments        Provider Praveena Gonzales    11/6/2019 1:00 PM Ankit Spann MD [address]

## (undated) NOTE — LETTER
Date: 6/24/2020    Patient Name: Jayne Cabrera     5/25/1965          To Whom it may concern:      Jayne Cabrera was seen at the [facility] for treatment of a medical condition.     Jayne Cabrera is scheduled for a procedure 6/30/20 an

## (undated) NOTE — LETTER
Zeynep Bright 89  [address] [facility]  Authorization for Surgical Operation and Procedure     Date:___________                                                                                                         Time:__________ 4.   Should the need arise during my operation or immediate post-operative period, I also consent to the administration of blood and/or blood products.   Further, I understand that despite careful testing and screening of blood or blood products by rylan 8.   I recognize that in the event my procedure results in extended X-Ray/fluoroscopy time, I may develop a skin reaction. 9.  If I have a Do Not Attempt Resuscitation (DNAR) order in place, that status will be suspended while in the operating room, proc 1. IFarooq agree to be cared for by an anesthesiologist, who is specially trained to monitor me and give me medicine to put me to sleep or keep me comfortable during my procedure    I understand that my anesthesiologist is not an employee or agen 5. My doctor has explained to me other choices available to me for my care (alternatives).   6. Pregnant Patients (“epidural”):  I understand that the risks of having an epidural (medicine given into my back to help control pain during labor), include itchi